# Patient Record
Sex: MALE | Race: WHITE | NOT HISPANIC OR LATINO | Employment: OTHER | URBAN - METROPOLITAN AREA
[De-identification: names, ages, dates, MRNs, and addresses within clinical notes are randomized per-mention and may not be internally consistent; named-entity substitution may affect disease eponyms.]

---

## 2022-05-26 ENCOUNTER — OFFICE VISIT (OUTPATIENT)
Dept: HEMATOLOGY/ONCOLOGY | Facility: CLINIC | Age: 87
End: 2022-05-26
Payer: MEDICARE

## 2022-05-26 VITALS
TEMPERATURE: 98 F | OXYGEN SATURATION: 99 % | RESPIRATION RATE: 18 BRPM | WEIGHT: 282.5 LBS | HEART RATE: 63 BPM | HEIGHT: 68 IN | DIASTOLIC BLOOD PRESSURE: 85 MMHG | BODY MASS INDEX: 42.81 KG/M2 | SYSTOLIC BLOOD PRESSURE: 145 MMHG

## 2022-05-26 DIAGNOSIS — T45.1X5A CHEMOTHERAPY ADVERSE REACTION, INITIAL ENCOUNTER: ICD-10-CM

## 2022-05-26 DIAGNOSIS — Z51.11 ENCOUNTER FOR ANTINEOPLASTIC CHEMOTHERAPY: Primary | ICD-10-CM

## 2022-05-26 DIAGNOSIS — C18.9 COLON CANCER METASTASIZED TO MULTIPLE SITES: ICD-10-CM

## 2022-05-26 PROCEDURE — 99215 PR OFFICE/OUTPT VISIT, EST, LEVL V, 40-54 MIN: ICD-10-PCS | Mod: ,,, | Performed by: STUDENT IN AN ORGANIZED HEALTH CARE EDUCATION/TRAINING PROGRAM

## 2022-05-26 PROCEDURE — 99215 OFFICE O/P EST HI 40 MIN: CPT | Mod: ,,, | Performed by: STUDENT IN AN ORGANIZED HEALTH CARE EDUCATION/TRAINING PROGRAM

## 2022-05-26 RX ORDER — HEPARIN 100 UNIT/ML
500 SYRINGE INTRAVENOUS
Status: CANCELLED | OUTPATIENT
Start: 2022-05-26

## 2022-05-26 RX ORDER — SODIUM CHLORIDE 0.9 % (FLUSH) 0.9 %
10 SYRINGE (ML) INJECTION
Status: CANCELLED | OUTPATIENT
Start: 2022-05-26

## 2022-05-26 RX ORDER — EPINEPHRINE 0.3 MG/.3ML
0.3 INJECTION SUBCUTANEOUS ONCE AS NEEDED
Status: CANCELLED | OUTPATIENT
Start: 2022-05-26

## 2022-05-26 RX ORDER — DIPHENHYDRAMINE HYDROCHLORIDE 50 MG/ML
50 INJECTION INTRAMUSCULAR; INTRAVENOUS ONCE AS NEEDED
Status: CANCELLED | OUTPATIENT
Start: 2022-05-26

## 2022-05-26 RX ORDER — LEVOTHYROXINE SODIUM 200 UG/1
300 TABLET ORAL DAILY
COMMUNITY
Start: 2022-05-15 | End: 2022-07-07

## 2022-05-26 NOTE — PROGRESS NOTES
Chief complaint: Metastatic colon cancer, MSI-H    Onc Hx:  7/2/20-7/9/20: injectafer x2  7/2/20-current: keytruda    HPI: 86 y/o M w/ PMHx of HTN, DVT postop after colectomy (was on Xarelto 11/2019-6/2020, Xarelto stopped due to GI bleed and anemia requiring frequent transfusions), VALENTIN due to GI blood loss, BPH first presented with hematochezia 10-11/2019. He had a colonoscopy with Dr Lima that showed a mass at 60cm, biopsy with poorly differentiated adenocarcinoma. On 11/26/19 he had a left colectomy with partial gastrectomy with Dr Liu, showing poorly differentiated adenocarcinoma invading gastric wall and visceral peritoneum and with 3/14 LNs+. Due to his advanced age, no chemotherapy was offered. 3/2020 he was noted to be anemic, given IV iron infusion and referred back to Dr Liu. He had imaging at [facility] showing widespread intra-abdominal metastatic disease. Referred to [facility] to establish care    He was given 2 doses of injectafer 7/2020  He was started on Keytruda for MSI-H metastatic colon cancer on 7/2/20    Today 05/26/2022, patient denies any new concerns since his last follow-up with us.  His daughter reports resolution of skin darkening around the eyes bilaterally, with improvement in the strength in bilateral lower extremities.  He reports seeing an endocrinologist last week and is waiting for a follow-up visit with them.  He reports a fair appetite denies any weight loss.  He denies any new foci of pain.  He remains independent with his ADLs.      PMHx: HTN, BPH, colon cancer, DVT, VALENTIN due to GI blood loss  PSHx: partial colectomy, cholecystectomy  Social Hx: former smoker quit 2010, 60 pack year, no ETOH, no drugs, prior  but no toxic exposures  Family Hx: mother: lung cancer, maternal aunt: breast cancer  Meds: reviewed  Allergies: NKDA    Labs:  05/24/2022:  Sodium 137, potassium 4.4, creatinine 1.12, albumin 3.7, alk-phos 61, total bili 0.5, AST 27, ALT 37, CEA 3.2, TSH 28.4 ,  cortisol 11.1, WBC 10.11, hemoglobin 16.5, MCV 94.1, platelet count 311.     4/5/2022: 2.3, TSH 17.40, free T4 0.65, creatinine 1.10, WBC count 8.93, hemoglobin 16.0, MCV 93.0, platelet count 409  2/23/2022: CEA 2.3, TSH 19.17,Creatinine 1.12, WBC count 8.94, hemoglobin 14.7, platelet count 347  1/12/2022: CEA 2.4, TSH 21.56, creatinine 1.28, albumin 3.7, WBC count 9.08, hemoglobin 17.0, platelet count 346.  11/30/21 CEA 2.4, TSH 18.088, Cr 1.24, Alb 3.5, WBC 9.21, Hgb 15.9, , ANC 5.96  10/13/21 CEA 2.4 FT4 1.08 Cr 1.17 Alb 3.5 TP 7.2 TSH 2.8 WBC 10 Hg 15.8  ANC 6.29  9/1/21 CEA 1.9, TSH 3.2, Cr 1.37, Alb 3.4, TP 7.2, FT4 1.03, WBC 9.01, Hgb 15.5, , ANC 5.75  7/21/21 CEA 2.2 TSH 6.84 Cr 1.27 Alb 3.5 TP 7.2 WBC 8.41 Hg 15.9  ANC 5.44  6/9/21 Cr 1.26, Alb 3.6, TP 7.5, AST 21, ALT 26, CEA 2.3, TSH 12.48, WBC 9.20, Hgb 15.4,   4/28/21 Cr 1.14 Alb 3.7 TP 7.2 AST 20 ALT 26 TSH 32.366 WBC 9.56 Hg 15.5   3/17/21 Cr 1.37, Alb 3.9, AST 22, ALT 24, CEA 3.8, TSH 33.6, WBC 8.34, Hgb 15.0, , ANC 5.25  2/3/21 Cr 1.43 Alb 3.9 AST 59 ALT 39 CEA 3.4 TSH 55.6 WBC 9.19 Hg 15.6  ANC 5.16 t3 UPTAKE 25.4 tt4 <3 Ferritin 116  12/24/20 Cr 1.09, TP 7.0, ALB 3.6, TSH 35.73, ferritin 66, WBC 8.70, Hg 16.3, , ANC 5.0, CEA 2.3  12/2/20 WBC 7.81 Hg 16.2 MCV 88.9 RDW 16  ANC 4.57 Cr 0.96 Alb 3.7, CEA 2.3  11/4/20 Cr 0.87, TP 7.4, albumin 3.7, WBC 8.43, Hg 17.0, , ANC 5.30, ferritin 47, TSH 3.52, CEA 2.3  10/14/20 WBC 7.98 Hg 16 MCV 85.2  ANC 4.55 Cr 0.94 Alb 3.5  9/23/20 WBC 6.83 Hg 14.8  Alb 3.4 Cr 0.93  9/1/20 WBC 7.77 Hg 14.4 MCV 82.6 RDW 24  ANC 4.51 Cr 0.91 Alb 3.6 AST 18 ALT 23 CEA 2 TSH 2 Ferritin 60  8/12/20 Cr 0.94 Alb 3.6 WBC 7.93 Hg 14.4   7/23/20 Cr 0.8 Ca 8.78 AST 14 ALT 12 CEA 1.6 Alkphos 79 Alb 3.5 TSH 1.15 Iron 42 TIBC 289 Ferritin 512 WBC 6.17 Hg 12.4  MCV 80.2 ANC 3.52  6/22/20 WBC 6.33 Hg 6.4 MCV 75.3 RDW 22.6 PLT  376  20 Cr 0.78 Alb 3.1  19 CEA 1.6    Imagin2022 CT chest abdomen pelvis with contrast:  No evidence of metastatic disease or other significant change since previous CT dated 2021.      21 CT C/A/P: stable chest CT with no evidence of metastatic disease. No evidence of metastatic disease or other significant changes in abdomen/pelvis since prior CT    21 CT C/A/P w/ IV contrast: no evidence of metastatic disease in chest. No evidence of neoplastic disease within abd or pelvis. Fatty liver has worsened. Prostate enlargement. Multiple bladder calculi. Distal colonic diverticulosis. Atherosclerosis.    3/16/21 CT C/A/P: No significant changes and no evidence of metastatic disease in the chest. Decrease in the size of the small soft tissue density nodules in the left upper quadrant. No other significant changes.    20 CT C/A/P w/ IV contrast: no evidence of thoracic metastatic disease. Left upper quadrant abd wall thickening has decreased slightly. Small area of soft tissue density seen in left upper quadrant adjacent to colon. Measures minimally smaller than on prior exam. Additional area of soft tissue density adjacent to stomach and pancreatic tail has not changed. Overall, several small soft tissue masses remain with left abdomen and left abdominal wall, few of those minimally decreased in size from prior scan.    20 CT C/A/P w/ IV contrast: no evidence of thoracic metastatic disease. Significant decrease in size of previously described masses within abd wall on the left. Residual mild localized soft tissue thickening of upper anterior abd wall to the left of midline adjacent to left lobe of liver at site of previously demonstrated mass. Residual oval shaped soft tissue mass more caudally on the left [id number] suspicious for residual tumor. Also residual mass in left upper quadrant between gastric body and tAil of pancreas that has also decreased in size and remains  contiguous with gastric wall. Omental mass decreased to 3cm.    6/9/20 CT C/A/P w/ IV contrast: no significant cardiopulmonary abnormality. Subtle nodularity in right lobe of thyroid lobe. Liver unremarkable. Several solid lesions involving the abdominal wall. 3.9cm lesion in upper abd wall to left of midline. 4.9cm lesion at more caudal level. Several solid lesions are seen within omental fat of left upper quadrant. Portion of this appears to be attached to stomach.    4/9/20 RLE venous duplex: occlusive DVT right popliteal vein.    11/15/19 CT A/P w/ IV contrast: colonic mass at splenic flexure 7.6x4.3cm, contiguous with the stomach. Numerous diverticula in descending and sigmoid colon    Path:  11/26/19 left colectomy with partial gastrectomy: tumor site splenic flexure, greatest dimension 8.5cm, no perforation, adenocarcinoma, mod to poorly differentiated, tumor invades visceral peritoneum and directly invades gastric wall. Margins neg. +LVI, +PNI, no tumor deposits. 3/14 LNs+.  pT4N1b    11/15/19 colon 60cm biopsy: poorly differentiated adenocarcinoma.  MLH1 loss of expression. MSH2 and MSH6 no loss of expression, PMS2 loss of expression. MSI-H both by MMR IHC and MSI PCR Review of Systems CONSTITUTIONAL: no fevers, no chills, no weight loss, no fatigue, no weakness  HEMATOLOGIC: no abnormal bleeding, no abnormal bruising, no drenching night sweats  ONCOLOGIC: no new masses or lumps  HEENT: no vision loss, no tinnitus or hearing loss, no nose bleeding, no dysphagia, no odynophagia  CVS: no chest pain, no palpitations, no dyspnea on exertion  RESP: no shortness of breath, no hemoptysis, no cough  GI: no nausea, no vomiting, no diarrhea, no constipation, no melena, no hematochezia, no hematemesis, no abdominal pain, no increase in abdominal girth  : no dysuria, no hematuria, no hesitancy, no scrotal swelling, no discharge  INTEGUMENT: + rashes, no abnormal bruising, no nail pitting, no hyperpigmentation  NEURO:  no falls, no memory loss, no paresthesias or dysesthesias, no urofecal incontinence or retention, no loss of strength on any extremity  MSK: no back pain, no new joint pain, no joint swelling, + muscle weakness  PSYCH: no suicidal or homicidal ideation, no depression, no insomnia, no anhedonia  ENDOCRINE: no heat or cold intolerance, no polyuria, no polydipsia    Physical Exam   Vitals:    05/26/22 1028   BP: (!) 145/85   Pulse: 63   Resp: 18   Temp: 97.8 °F (36.6 °C)       GA: AAOx3, NAD  HEENT: NCAT, PERRLA, EOMI, left eye ectropion, no oral ulcers  LYMPH: no cervical, axillary or supraclavicular adenopathy  CVS: s1s2 RRR, no M/R/G  RESP: mild bibasilar crackles, no wheezes or rhonchi  ABD: soft, NT, ND, BS+, no hepatosplenomegaly. Well-healed incision without surrounding edema or erythema.  EXT: no deformities, b/l pedal edema 1+  SKIN:  Resolution of prior rash around bilateral eyes  NEURO: normal mentation, strength 5/5 on all 4 extremities, no sensory deficits     Assessment/Plan     1. 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  Keytruda started on 7/2/20. Changed to q6week on 12/23/20  CT C/A/P on 7/16/21 and 11/2021 essentially ERIC.  CT chest abdomen pelvis in April 2022 with ERIC  C/w HCTZ 12.5mg daily  Started Synthroid 75mcg daily on 2/4/21, increased to 100mcg 3/2021, increased to 150mcg 5/2021, increased to 175mcg 6/2021, now on 225 mcg daily. A patient has been seen by Endocrinology with plans to follow-up in the near future.  He remains on 200 mcg of  Synthroid daily according to their recommendations.  Discussed with patient and daughter the options to continue close surveillance versus restart treatment with pembrolizumab given patient's long history of ERIC and almost 2 years of treatment with pembrolizumab.  Patient and daughter would like to continue pembrolizumab with plans to decrease dose or increase the dosing interval if he were to have recurrent side effects from treatment.    Plan  # proceed with next cycle of Keytruda 400 mg Q 6 weeks today  # plan to follow-up in clinic in 6 weeks with repeat CBC, CMP, CEA, TSH and random cortisol.

## 2022-07-06 NOTE — PROGRESS NOTES
Subjective:       Patient ID: Stefano Mccoy is a 87 y.o. male.    Chief Complaint: Colon Cancer      History of Present Illness  Chief complaint: Metastatic colon cancer, MSI-H    Onc Hx:  7/2/20-7/9/20: injectafer x2  7/2/20-current: keytruda    HPI: 86 y/o M w/ PMHx of HTN, DVT postop after colectomy (was on Xarelto 11/2019-6/2020, Xarelto stopped due to GI bleed and anemia requiring frequent transfusions), VALENTIN due to GI blood loss, BPH first presented with hematochezia 10-11/2019. He had a colonoscopy with Dr Lima that showed a mass at 60cm, biopsy with poorly differentiated adenocarcinoma. On 11/26/19 he had a left colectomy with partial gastrectomy with Dr Liu, showing poorly differentiated adenocarcinoma invading gastric wall and visceral peritoneum and with 3/14 LNs+. Due to his advanced age, no chemotherapy was offered. 3/2020 he was noted to be anemic, given IV iron infusion and referred back to Dr Liu. He had imaging at [facility] showing widespread intra-abdominal metastatic disease. Referred to [facility] to establish care    He was given 2 doses of injectafer 7/2020  He was started on Keytruda for MSI-H metastatic colon cancer on 7/2/20    Today 7/7/22: Patient here today with his daughter for follow up visit. He is due for Keytruda today. He tolerated his last cycle very well without any significant toxicities. Patient denies any new concerns since his last follow-up with us and is doing well overall. He continues to follow up endocrinology and states Dr. Shepherd increased his Synthroid to 300 mcg daily about a month ago. He reports a fair appetite denies any weight loss.  He denies any new foci of pain. Bowels are moving well. Denies N/V/D/C or abdominal pain. Denies SOB, CP, HA, fevers chills or recent infections. He remains independent with his ADLs. No symptoms of polyuria or polydipsia. He admits he ate a [place] bar before doing blood work. No concerns reported.       PMHx: HTN, BPH, colon cancer,  DVT, VALENTIN due to GI blood loss  PSHx: partial colectomy, cholecystectomy  Social Hx: former smoker quit 2010, 60 pack year, no ETOH, no drugs, prior  but no toxic exposures  Family Hx: mother: lung cancer, maternal aunt: breast cancer  Meds: reviewed  Allergies: NKDA    Labs:  7/6/22 FT4 1.24, TSH 8.8, cortisol 13.6, Cr 1.31, ,  LFTs WNL, Alb 3.6, WBC 7.67, Hgb 15.9, , ANC 4.80    05/24/2022:  Sodium 137, potassium 4.4, creatinine 1.12, albumin 3.7, alk-phos 61, total bili 0.5, AST 27, ALT 37, CEA 3.2, TSH 28.4 , cortisol 11.1, WBC 10.11, hemoglobin 16.5, MCV 94.1, platelet count 311.     4/5/2022: 2.3, TSH 17.40, free T4 0.65, creatinine 1.10, WBC count 8.93, hemoglobin 16.0, MCV 93.0, platelet count 409  2/23/2022: CEA 2.3, TSH 19.17,Creatinine 1.12, WBC count 8.94, hemoglobin 14.7, platelet count 347  1/12/2022: CEA 2.4, TSH 21.56, creatinine 1.28, albumin 3.7, WBC count 9.08, hemoglobin 17.0, platelet count 346.  11/30/21 CEA 2.4, TSH 18.088, Cr 1.24, Alb 3.5, WBC 9.21, Hgb 15.9, , ANC 5.96  10/13/21 CEA 2.4 FT4 1.08 Cr 1.17 Alb 3.5 TP 7.2 TSH 2.8 WBC 10 Hg 15.8  ANC 6.29  9/1/21 CEA 1.9, TSH 3.2, Cr 1.37, Alb 3.4, TP 7.2, FT4 1.03, WBC 9.01, Hgb 15.5, , ANC 5.75  7/21/21 CEA 2.2 TSH 6.84 Cr 1.27 Alb 3.5 TP 7.2 WBC 8.41 Hg 15.9  ANC 5.44  6/9/21 Cr 1.26, Alb 3.6, TP 7.5, AST 21, ALT 26, CEA 2.3, TSH 12.48, WBC 9.20, Hgb 15.4,   4/28/21 Cr 1.14 Alb 3.7 TP 7.2 AST 20 ALT 26 TSH 32.366 WBC 9.56 Hg 15.5   3/17/21 Cr 1.37, Alb 3.9, AST 22, ALT 24, CEA 3.8, TSH 33.6, WBC 8.34, Hgb 15.0, , ANC 5.25  2/3/21 Cr 1.43 Alb 3.9 AST 59 ALT 39 CEA 3.4 TSH 55.6 WBC 9.19 Hg 15.6  ANC 5.16 t3 UPTAKE 25.4 tt4 <3 Ferritin 116  12/24/20 Cr 1.09, TP 7.0, ALB 3.6, TSH 35.73, ferritin 66, WBC 8.70, Hg 16.3, , ANC 5.0, CEA 2.3  12/2/20 WBC 7.81 Hg 16.2 MCV 88.9 RDW 16  ANC 4.57 Cr 0.96 Alb 3.7, CEA 2.3  11/4/20 Cr 0.87, TP 7.4, albumin 3.7, WBC  8.43, Hg 17.0, , ANC 5.30, ferritin 47, TSH 3.52, CEA 2.3  10/14/20 WBC 7.98 Hg 16 MCV 85.2  ANC 4.55 Cr 0.94 Alb 3.5  20 WBC 6.83 Hg 14.8  Alb 3.4 Cr 0.93  20 WBC 7.77 Hg 14.4 MCV 82.6 RDW 24  ANC 4.51 Cr 0.91 Alb 3.6 AST 18 ALT 23 CEA 2 TSH 2 Ferritin 60  20 Cr 0.94 Alb 3.6 WBC 7.93 Hg 14.4   20 Cr 0.8 Ca 8.78 AST 14 ALT 12 CEA 1.6 Alkphos 79 Alb 3.5 TSH 1.15 Iron 42 TIBC 289 Ferritin 512 WBC 6.17 Hg 12.4  MCV 80.2 ANC 3.52  20 WBC 6.33 Hg 6.4 MCV 75.3 RDW 22.6   20 Cr 0.78 Alb 3.1  19 CEA 1.6    Imagin2022 CT chest abdomen pelvis with contrast:  No evidence of metastatic disease or other significant change since previous CT dated 2021.      21 CT C/A/P: stable chest CT with no evidence of metastatic disease. No evidence of metastatic disease or other significant changes in abdomen/pelvis since prior CT    21 CT C/A/P w/ IV contrast: no evidence of metastatic disease in chest. No evidence of neoplastic disease within abd or pelvis. Fatty liver has worsened. Prostate enlargement. Multiple bladder calculi. Distal colonic diverticulosis. Atherosclerosis.    3/16/21 CT C/A/P: No significant changes and no evidence of metastatic disease in the chest. Decrease in the size of the small soft tissue density nodules in the left upper quadrant. No other significant changes.    20 CT C/A/P w/ IV contrast: no evidence of thoracic metastatic disease. Left upper quadrant abd wall thickening has decreased slightly. Small area of soft tissue density seen in left upper quadrant adjacent to colon. Measures minimally smaller than on prior exam. Additional area of soft tissue density adjacent to stomach and pancreatic tail has not changed. Overall, several small soft tissue masses remain with left abdomen and left abdominal wall, few of those minimally decreased in size from prior scan.    20 CT C/A/P w/ IV  contrast: no evidence of thoracic metastatic disease. Significant decrease in size of previously described masses within abd wall on the left. Residual mild localized soft tissue thickening of upper anterior abd wall to the left of midline adjacent to left lobe of liver at site of previously demonstrated mass. Residual oval shaped soft tissue mass more caudally on the left [id number] suspicious for residual tumor. Also residual mass in left upper quadrant between gastric body and tAil of pancreas that has also decreased in size and remains contiguous with gastric wall. Omental mass decreased to 3cm.    6/9/20 CT C/A/P w/ IV contrast: no significant cardiopulmonary abnormality. Subtle nodularity in right lobe of thyroid lobe. Liver unremarkable. Several solid lesions involving the abdominal wall. 3.9cm lesion in upper abd wall to left of midline. 4.9cm lesion at more caudal level. Several solid lesions are seen within omental fat of left upper quadrant. Portion of this appears to be attached to stomach.    4/9/20 RLE venous duplex: occlusive DVT right popliteal vein.    11/15/19 CT A/P w/ IV contrast: colonic mass at splenic flexure 7.6x4.3cm, contiguous with the stomach. Numerous diverticula in descending and sigmoid colon    Path:  11/26/19 left colectomy with partial gastrectomy: tumor site splenic flexure, greatest dimension 8.5cm, no perforation, adenocarcinoma, mod to poorly differentiated, tumor invades visceral peritoneum and directly invades gastric wall. Margins neg. +LVI, +PNI, no tumor deposits. 3/14 LNs+.  pT4N1b    11/15/19 colon 60cm biopsy: poorly differentiated adenocarcinoma.  MLH1 loss of expression. MSH2 and MSH6 no loss of expression, PMS2 loss of expression. MSI-H both by MMR IHC and MSI PCR       Review of Systems  CONSTITUTIONAL: no fevers, no chills, no weight loss, no fatigue, no weakness  HEMATOLOGIC: no abnormal bleeding, no abnormal bruising, no drenching night sweats  ONCOLOGIC: no new  masses or lumps  HEENT: no vision loss, no tinnitus or hearing loss, no nose bleeding, no dysphagia, no odynophagia  CVS: no chest pain, no palpitations, no dyspnea on exertion  RESP: no shortness of breath, no hemoptysis, no cough  GI: no nausea, no vomiting, no diarrhea, no constipation, no melena, no hematochezia, no hematemesis, no abdominal pain, no increase in abdominal girth  : no dysuria, no hematuria, no hesitancy, no scrotal swelling, no discharge  INTEGUMENT: no rashes, no abnormal bruising, no nail pitting, no hyperpigmentation  NEURO: no falls, no memory loss, no paresthesias or dysesthesias, no urofecal incontinence or retention, no loss of strength on any extremity  MSK: no back pain, no new joint pain, no joint swelling, no muscle weakness  PSYCH: no suicidal or homicidal ideation, no depression, no insomnia, no anhedonia  ENDOCRINE: no heat or cold intolerance, no polyuria, no polydipsia      Objective:      Physical Exam  Vitals:    07/07/22 1323   BP: (!) 166/77   Pulse: 81   Resp: 16   Temp: 97.6 °F (36.4 °C)        GA: AAOx3, NAD  HEENT: NCAT, PERRLA, EOMI, left eye ectropion, no oral ulcers  LYMPH: no cervical, axillary or supraclavicular adenopathy  CVS: s1s2 RRR, no M/R/G  RESP: mild bibasilar crackles, no wheezes or rhonchi  ABD: soft, NT, ND, BS+, no hepatosplenomegaly. Well-healed incision without surrounding edema or erythema.  EXT: no deformities, b/l pedal edema 1+  SKIN:  Resolution of prior rash around bilateral eyes  NEURO: normal mentation, strength 5/5 on all 4 extremities, no sensory deficits   Assessment:       Problem List Items Addressed This Visit        Oncology    Colon cancer metastasized to multiple sites - Primary    Encounter for antineoplastic chemotherapy      1. 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  Keytruda started on 7/2/20. Changed to q6week on 12/23/20  CT C/A/P on 7/16/21 and 11/2021 essentially ERIC.  CT chest abdomen pelvis in April 2022 with ERIC  C/w HCTZ 12.5mg daily  Synthroid increased to 300 mcg daily per Dr. Shepherd on 6/9/22. Most recent TSH improved from prior, now 8.8  MD previously discussed with patient and daughter the options to continue close surveillance versus restart treatment with pembrolizumab given patient's long history of ERIC and almost 2 years of treatment with pembrolizumab.  Patient and daughter would like to continue pembrolizumab with plans to decrease dose or increase the dosing interval if he were to have recurrent side effects from treatment.  Will plan to obtain imaging q4-6 months or as clinically indicated      Plan:       # proceed with next cycle of Keytruda 400 mg Q 6 weeks today  # C/w Synthroid 300 mcg daily as prescribed per Dr. Shepherd  # plan to follow-up in clinic in 6 weeks with repeat CBC, CMP, CEA, TSH, FT4 and random cortisol.  # Call if any questions or concerns      KELLI Cruz    Treatment Plan Information   OP PEMBROLIZUMAB 400MG Q6W   Deidre Bustillo MD   Upcoming Treatment Dates - OP PEMBROLIZUMAB 400MG Q6W    7/7/2022       Chemotherapy       pembrolizumab (KEYTRUDA) 400 mg in sodium chloride 0.9% 116 mL infusion  8/18/2022       Chemotherapy       pembrolizumab (KEYTRUDA) 400 mg in sodium chloride 0.9% 116 mL infusion  9/29/2022       Chemotherapy       pembrolizumab (KEYTRUDA) 400 mg in sodium chloride 0.9% 116 mL infusion  11/10/2022       Chemotherapy       pembrolizumab (KEYTRUDA) 400 mg in sodium chloride 0.9% 116 mL infusion

## 2022-07-07 ENCOUNTER — OFFICE VISIT (OUTPATIENT)
Dept: HEMATOLOGY/ONCOLOGY | Facility: CLINIC | Age: 87
End: 2022-07-07
Payer: MEDICARE

## 2022-07-07 VITALS
RESPIRATION RATE: 16 BRPM | TEMPERATURE: 98 F | DIASTOLIC BLOOD PRESSURE: 77 MMHG | WEIGHT: 277.88 LBS | OXYGEN SATURATION: 95 % | BODY MASS INDEX: 42.11 KG/M2 | HEART RATE: 81 BPM | HEIGHT: 68 IN | SYSTOLIC BLOOD PRESSURE: 166 MMHG

## 2022-07-07 DIAGNOSIS — Z51.11 ENCOUNTER FOR ANTINEOPLASTIC CHEMOTHERAPY: ICD-10-CM

## 2022-07-07 DIAGNOSIS — C18.9 COLON CANCER METASTASIZED TO MULTIPLE SITES: Primary | ICD-10-CM

## 2022-07-07 PROCEDURE — 99214 OFFICE O/P EST MOD 30 MIN: CPT | Mod: ,,, | Performed by: NURSE PRACTITIONER

## 2022-07-07 PROCEDURE — 99214 PR OFFICE/OUTPT VISIT, EST, LEVL IV, 30-39 MIN: ICD-10-PCS | Mod: ,,, | Performed by: NURSE PRACTITIONER

## 2022-07-07 RX ORDER — EPINEPHRINE 0.3 MG/.3ML
0.3 INJECTION SUBCUTANEOUS ONCE AS NEEDED
Status: CANCELLED | OUTPATIENT
Start: 2022-07-07

## 2022-07-07 RX ORDER — LEVOTHYROXINE SODIUM 300 UG/1
300 TABLET ORAL DAILY
COMMUNITY
Start: 2022-06-09 | End: 2022-08-18 | Stop reason: DRUGHIGH

## 2022-07-07 RX ORDER — SODIUM CHLORIDE 0.9 % (FLUSH) 0.9 %
10 SYRINGE (ML) INJECTION
Status: CANCELLED | OUTPATIENT
Start: 2022-07-07

## 2022-07-07 RX ORDER — DIPHENHYDRAMINE HYDROCHLORIDE 50 MG/ML
50 INJECTION INTRAMUSCULAR; INTRAVENOUS ONCE AS NEEDED
Status: CANCELLED | OUTPATIENT
Start: 2022-07-07

## 2022-07-07 RX ORDER — HEPARIN 100 UNIT/ML
500 SYRINGE INTRAVENOUS
Status: CANCELLED | OUTPATIENT
Start: 2022-07-07

## 2022-08-17 NOTE — PROGRESS NOTES
"Subjective:       Patient ID: Stefano Mccoy is a 87 y.o. male.    Chief Complaint: None.     History of Present Illness  Chief complaint: Metastatic colon cancer, MSI-H    Onc Hx:  7/2/20-7/9/20: injectafer x2  7/2/20-current: keytruda    HPI: 86 y/o M w/ PMHx of HTN, DVT postop after colectomy (was on Xarelto 11/2019-6/2020, Xarelto stopped due to GI bleed and anemia requiring frequent transfusions), VALENTIN due to GI blood loss, BPH first presented with hematochezia 10-11/2019. He had a colonoscopy with Dr Lima that showed a mass at 60cm, biopsy with poorly differentiated adenocarcinoma. On 11/26/19 he had a left colectomy with partial gastrectomy with Dr Liu, showing poorly differentiated adenocarcinoma invading gastric wall and visceral peritoneum and with 3/14 LNs+. Due to his advanced age, no chemotherapy was offered. 3/2020 he was noted to be anemic, given IV iron infusion and referred back to Dr Liu. He had imaging at [facility] showing widespread intra-abdominal metastatic disease. Referred to [facility] to establish care    He was given 2 doses of injectafer 7/2020  He was started on Keytruda for MSI-H metastatic colon cancer on 7/2/20    Interval history:   Today 8/18/2022: Patient here today with his daughter for follow up visit. He is due for every 6 week Keytruda today. He states he is doing "great" and has no complaints.   He tolerated his last cycle very well without any significant toxicities. Reports appetite as good. Weight is stable. He does have a diagnosis of macular degeneration(dry). States no vision changes, feels he sees pretty good. Was seeing Dr. Ortiz, but per daughter no further treatment is available.   He denies any cough, SOB, CP, abdominal pain, constipation, diarrhea, rashes or fever.     PMHx: HTN, BPH, colon cancer, DVT, VALENTIN due to GI blood loss  PSHx: partial colectomy, cholecystectomy  Social Hx: former smoker quit 2010, 60 pack year, no ETOH, no drugs, prior  but no toxic " exposures  Family Hx: mother: lung cancer, maternal aunt: breast cancer  Meds: reviewed  Allergies: NKDA    Labs:  7/6/22 FT4 1.24, TSH 8.8, cortisol 13.6, Cr 1.31, ,  LFTs WNL, Alb 3.6, WBC 7.67, Hgb 15.9, , ANC 4.80    05/24/2022:  Sodium 137, potassium 4.4, creatinine 1.12, albumin 3.7, alk-phos 61, total bili 0.5, AST 27, ALT 37, CEA 3.2, TSH 28.4 , cortisol 11.1, WBC 10.11, hemoglobin 16.5, MCV 94.1, platelet count 311.     4/5/2022: 2.3, TSH 17.40, free T4 0.65, creatinine 1.10, WBC count 8.93, hemoglobin 16.0, MCV 93.0, platelet count 409  2/23/2022: CEA 2.3, TSH 19.17,Creatinine 1.12, WBC count 8.94, hemoglobin 14.7, platelet count 347  1/12/2022: CEA 2.4, TSH 21.56, creatinine 1.28, albumin 3.7, WBC count 9.08, hemoglobin 17.0, platelet count 346.  11/30/21 CEA 2.4, TSH 18.088, Cr 1.24, Alb 3.5, WBC 9.21, Hgb 15.9, , ANC 5.96  10/13/21 CEA 2.4 FT4 1.08 Cr 1.17 Alb 3.5 TP 7.2 TSH 2.8 WBC 10 Hg 15.8  ANC 6.29  9/1/21 CEA 1.9, TSH 3.2, Cr 1.37, Alb 3.4, TP 7.2, FT4 1.03, WBC 9.01, Hgb 15.5, , ANC 5.75  7/21/21 CEA 2.2 TSH 6.84 Cr 1.27 Alb 3.5 TP 7.2 WBC 8.41 Hg 15.9  ANC 5.44  6/9/21 Cr 1.26, Alb 3.6, TP 7.5, AST 21, ALT 26, CEA 2.3, TSH 12.48, WBC 9.20, Hgb 15.4,   4/28/21 Cr 1.14 Alb 3.7 TP 7.2 AST 20 ALT 26 TSH 32.366 WBC 9.56 Hg 15.5   3/17/21 Cr 1.37, Alb 3.9, AST 22, ALT 24, CEA 3.8, TSH 33.6, WBC 8.34, Hgb 15.0, , ANC 5.25  2/3/21 Cr 1.43 Alb 3.9 AST 59 ALT 39 CEA 3.4 TSH 55.6 WBC 9.19 Hg 15.6  ANC 5.16 t3 UPTAKE 25.4 tt4 <3 Ferritin 116  12/24/20 Cr 1.09, TP 7.0, ALB 3.6, TSH 35.73, ferritin 66, WBC 8.70, Hg 16.3, , ANC 5.0, CEA 2.3  12/2/20 WBC 7.81 Hg 16.2 MCV 88.9 RDW 16  ANC 4.57 Cr 0.96 Alb 3.7, CEA 2.3  11/4/20 Cr 0.87, TP 7.4, albumin 3.7, WBC 8.43, Hg 17.0, , ANC 5.30, ferritin 47, TSH 3.52, CEA 2.3  10/14/20 WBC 7.98 Hg 16 MCV 85.2  ANC 4.55 Cr 0.94 Alb 3.5  9/23/20 WBC 6.83 Hg 14.8  Alb  3.4 Cr 0.93  20 WBC 7.77 Hg 14.4 MCV 82.6 RDW 24  ANC 4.51 Cr 0.91 Alb 3.6 AST 18 ALT 23 CEA 2 TSH 2 Ferritin 60  20 Cr 0.94 Alb 3.6 WBC 7.93 Hg 14.4   20 Cr 0.8 Ca 8.78 AST 14 ALT 12 CEA 1.6 Alkphos 79 Alb 3.5 TSH 1.15 Iron 42 TIBC 289 Ferritin 512 WBC 6.17 Hg 12.4  MCV 80.2 ANC 3.52  20 WBC 6.33 Hg 6.4 MCV 75.3 RDW 22.6   20 Cr 0.78 Alb 3.1  19 CEA 1.6    Imagin2022 CT chest abdomen pelvis with contrast:  No evidence of metastatic disease or other significant change since previous CT dated 2021.      21 CT C/A/P: stable chest CT with no evidence of metastatic disease. No evidence of metastatic disease or other significant changes in abdomen/pelvis since prior CT    21 CT C/A/P w/ IV contrast: no evidence of metastatic disease in chest. No evidence of neoplastic disease within abd or pelvis. Fatty liver has worsened. Prostate enlargement. Multiple bladder calculi. Distal colonic diverticulosis. Atherosclerosis.    3/16/21 CT C/A/P: No significant changes and no evidence of metastatic disease in the chest. Decrease in the size of the small soft tissue density nodules in the left upper quadrant. No other significant changes.    20 CT C/A/P w/ IV contrast: no evidence of thoracic metastatic disease. Left upper quadrant abd wall thickening has decreased slightly. Small area of soft tissue density seen in left upper quadrant adjacent to colon. Measures minimally smaller than on prior exam. Additional area of soft tissue density adjacent to stomach and pancreatic tail has not changed. Overall, several small soft tissue masses remain with left abdomen and left abdominal wall, few of those minimally decreased in size from prior scan.    20 CT C/A/P w/ IV contrast: no evidence of thoracic metastatic disease. Significant decrease in size of previously described masses within abd wall on the left. Residual mild localized soft  tissue thickening of upper anterior abd wall to the left of midline adjacent to left lobe of liver at site of previously demonstrated mass. Residual oval shaped soft tissue mass more caudally on the left [id number] suspicious for residual tumor. Also residual mass in left upper quadrant between gastric body and tAil of pancreas that has also decreased in size and remains contiguous with gastric wall. Omental mass decreased to 3cm.    6/9/20 CT C/A/P w/ IV contrast: no significant cardiopulmonary abnormality. Subtle nodularity in right lobe of thyroid lobe. Liver unremarkable. Several solid lesions involving the abdominal wall. 3.9cm lesion in upper abd wall to left of midline. 4.9cm lesion at more caudal level. Several solid lesions are seen within omental fat of left upper quadrant. Portion of this appears to be attached to stomach.    4/9/20 RLE venous duplex: occlusive DVT right popliteal vein.    11/15/19 CT A/P w/ IV contrast: colonic mass at splenic flexure 7.6x4.3cm, contiguous with the stomach. Numerous diverticula in descending and sigmoid colon    Path:  11/26/19 left colectomy with partial gastrectomy: tumor site splenic flexure, greatest dimension 8.5cm, no perforation, adenocarcinoma, mod to poorly differentiated, tumor invades visceral peritoneum and directly invades gastric wall. Margins neg. +LVI, +PNI, no tumor deposits. 3/14 LNs+.  pT4N1b    11/15/19 colon 60cm biopsy: poorly differentiated adenocarcinoma.  MLH1 loss of expression. MSH2 and MSH6 no loss of expression, PMS2 loss of expression. MSI-H both by MMR IHC and MSI PCR     Review of Systems  CONSTITUTIONAL: no fevers, no chills, no weight loss, no fatigue, no weakness  HEMATOLOGIC: no abnormal bleeding, no abnormal bruising, no drenching night sweats  ONCOLOGIC: no new masses or lumps  HEENT: no vision loss, no tinnitus or hearing loss, no nose bleeding, no dysphagia, no odynophagia, no dental pain.   CVS: no chest pain, no palpitations, no  "dyspnea on exertion  RESP: no shortness of breath, no hemoptysis, no cough  GI: no nausea, no vomiting, no diarrhea, no constipation, no melena, no hematochezia, no hematemesis, no abdominal pain, no increase in abdominal girth  : no dysuria, no hematuria, no hesitancy, no scrotal swelling, no discharge  INTEGUMENT: no rashes, no abnormal bruising, no nail pitting, no hyperpigmentation  NEURO: no falls, no memory loss, no paresthesias or dysesthesias, no urofecal incontinence or retention, no loss of strength on any extremity  MSK: no back pain, no new joint pain, no joint swelling, no muscle weakness  PSYCH: no suicidal or homicidal ideation, no depression, no insomnia, no anhedonia  ENDOCRINE: no heat or cold intolerance, no polyuria, no polydipsia    Objective:     Physical Exam  BP  154/90      BP Location  Left arm     Patient Position  Sitting     BP Method  Large (Automatic)     Pulse  68     Resp  16     Temp  97.5 F (36.4 C)     Temp src  Oral     SpO2  93%     Weight   126 kg (277 lb 12.5 oz)     Height  5' 8" (1.727 m)     Pain Score  0-No pain      GA: AAOx3, very pleasant gentleman with a joking disposition. NAD  HEENT: NCAT, PERRL, EOMI, left eye ectropion, Oropharynx moist, fair dentition. no oral ulcers  LYMPH: no cervical, axillary or supraclavicular adenopathy  CVS: s1s2 RRR, no M/R/G  RESP: Lung sounds clear to auscultation. No wheezing or other adventitious sounds.   ABD: soft, NT, ND, BS+, no hepatosplenomegaly. Well-healed incision, left side. Hernia present just lateral of incision. Soft and non-tender, no pressure.   EXT: no deformities, b/l pedal edema 1+  SKIN:  Warm, dry. No rashes or bruising. No jaundice.   NEURO: normal mentation, strength 5/5 on all 4 extremities, no sensory deficits     Assessment:       Problem List Items Addressed This Visit    None     1. 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  Keytruda started on 7/2/20. Changed to q6week on 12/23/20  CT C/A/P on 7/16/21 and 11/2021 essentially ERIC.  CT chest abdomen pelvis in April 2022 with [facility]OO previously discussed with patient and daughter the options to continue close surveillance versus restart treatment with pembrolizumab given patient's long history of ERIC and almost 2 years of treatment with pembrolizumab.  Patient and daughter would like to continue pembrolizumab with plans to decrease dose or increase the dosing interval if he were to have recurrent side effects from treatment.  Will plan to obtain imaging q4-6 months or as clinically indicated    Low TSH - per daughter, she reports that when he was on the 225 mcg, patient did not always take the 25 mcg. So will decrease his Levothyroxine dose today to 200 mcg from 300 mcg. Repeat TSH next appointment.     HTN - improved from before. Per daughter, he is not on any blood pressure medication. Advised him to limit his salt intake. Normal creatinine.      Plan:   # proceed with next cycle of Keytruda 400 mg Q 6 weeks today  # C/w Synthroid - decreased dose 200 mcg daily. Follow up with Dr. Shepherd as directed.   # plan to follow-up in clinic in 6 weeks with repeat CBC, CMP, CEA, TSH, FT4 and random cortisol.  # Call if any questions or concerns      Zoila Barbosa NP-C    Treatment Plan Information   OP PEMBROLIZUMAB 400MG Q6W   Deidre Bustillo MD   Upcoming Treatment Dates - OP PEMBROLIZUMAB 400MG Q6W    8/18/2022       Chemotherapy       pembrolizumab (KEYTRUDA) 400 mg in sodium chloride 0.9% 116 mL  infusion  9/29/2022       Chemotherapy       pembrolizumab (KEYTRUDA) 400 mg in sodium chloride 0.9% 116 mL infusion  11/10/2022       Chemotherapy       pembrolizumab (KEYTRUDA) 400 mg in sodium chloride 0.9% 116 mL infusion  12/22/2022       Chemotherapy       pembrolizumab (KEYTRUDA) 400 mg in sodium chloride 0.9% 116 mL infusion

## 2022-08-18 ENCOUNTER — OFFICE VISIT (OUTPATIENT)
Dept: HEMATOLOGY/ONCOLOGY | Facility: CLINIC | Age: 87
End: 2022-08-18
Payer: MEDICARE

## 2022-08-18 VITALS
HEART RATE: 68 BPM | TEMPERATURE: 98 F | HEIGHT: 68 IN | DIASTOLIC BLOOD PRESSURE: 90 MMHG | RESPIRATION RATE: 16 BRPM | BODY MASS INDEX: 42.1 KG/M2 | OXYGEN SATURATION: 93 % | SYSTOLIC BLOOD PRESSURE: 154 MMHG | WEIGHT: 277.75 LBS

## 2022-08-18 DIAGNOSIS — Z51.12 ENCOUNTER FOR ANTINEOPLASTIC IMMUNOTHERAPY: ICD-10-CM

## 2022-08-18 DIAGNOSIS — C18.9 COLON CANCER METASTASIZED TO MULTIPLE SITES: Primary | ICD-10-CM

## 2022-08-18 PROCEDURE — 99215 OFFICE O/P EST HI 40 MIN: CPT | Mod: ,,, | Performed by: NURSE PRACTITIONER

## 2022-08-18 PROCEDURE — 99215 PR OFFICE/OUTPT VISIT, EST, LEVL V, 40-54 MIN: ICD-10-PCS | Mod: ,,, | Performed by: NURSE PRACTITIONER

## 2022-08-18 RX ORDER — HEPARIN 100 UNIT/ML
500 SYRINGE INTRAVENOUS
Status: CANCELLED | OUTPATIENT
Start: 2022-08-18

## 2022-08-18 RX ORDER — SODIUM CHLORIDE 0.9 % (FLUSH) 0.9 %
10 SYRINGE (ML) INJECTION
Status: CANCELLED | OUTPATIENT
Start: 2022-08-18

## 2022-08-18 RX ORDER — EPINEPHRINE 0.3 MG/.3ML
0.3 INJECTION SUBCUTANEOUS ONCE AS NEEDED
Status: CANCELLED | OUTPATIENT
Start: 2022-08-18

## 2022-08-18 RX ORDER — LEVOTHYROXINE SODIUM 200 UG/1
200 TABLET ORAL DAILY
Qty: 30 TABLET | Refills: 11 | Status: SHIPPED | OUTPATIENT
Start: 2022-08-18 | End: 2022-09-29 | Stop reason: SDUPTHER

## 2022-08-18 RX ORDER — DIPHENHYDRAMINE HYDROCHLORIDE 50 MG/ML
50 INJECTION INTRAMUSCULAR; INTRAVENOUS ONCE AS NEEDED
Status: CANCELLED | OUTPATIENT
Start: 2022-08-18

## 2022-09-29 ENCOUNTER — OFFICE VISIT (OUTPATIENT)
Dept: HEMATOLOGY/ONCOLOGY | Facility: CLINIC | Age: 87
End: 2022-09-29
Payer: MEDICARE

## 2022-09-29 VITALS
HEIGHT: 68 IN | SYSTOLIC BLOOD PRESSURE: 147 MMHG | DIASTOLIC BLOOD PRESSURE: 86 MMHG | WEIGHT: 270.31 LBS | HEART RATE: 60 BPM | TEMPERATURE: 98 F | OXYGEN SATURATION: 95 % | RESPIRATION RATE: 18 BRPM | BODY MASS INDEX: 40.97 KG/M2

## 2022-09-29 DIAGNOSIS — C18.9 COLON CANCER METASTASIZED TO MULTIPLE SITES: Primary | ICD-10-CM

## 2022-09-29 DIAGNOSIS — Z51.12 ENCOUNTER FOR ANTINEOPLASTIC IMMUNOTHERAPY: ICD-10-CM

## 2022-09-29 DIAGNOSIS — E03.9 HYPOTHYROIDISM, UNSPECIFIED TYPE: ICD-10-CM

## 2022-09-29 DIAGNOSIS — Z51.11 ENCOUNTER FOR ANTINEOPLASTIC CHEMOTHERAPY: ICD-10-CM

## 2022-09-29 PROCEDURE — 99215 OFFICE O/P EST HI 40 MIN: CPT | Mod: ,,, | Performed by: STUDENT IN AN ORGANIZED HEALTH CARE EDUCATION/TRAINING PROGRAM

## 2022-09-29 PROCEDURE — 99215 PR OFFICE/OUTPT VISIT, EST, LEVL V, 40-54 MIN: ICD-10-PCS | Mod: ,,, | Performed by: STUDENT IN AN ORGANIZED HEALTH CARE EDUCATION/TRAINING PROGRAM

## 2022-09-29 RX ORDER — SODIUM CHLORIDE 0.9 % (FLUSH) 0.9 %
10 SYRINGE (ML) INJECTION
Status: CANCELLED | OUTPATIENT
Start: 2022-09-29

## 2022-09-29 RX ORDER — LEVOTHYROXINE SODIUM 300 UG/1
300 TABLET ORAL DAILY
Qty: 30 TABLET | Refills: 11 | Status: SHIPPED | OUTPATIENT
Start: 2022-09-29 | End: 2023-07-20

## 2022-09-29 RX ORDER — EPINEPHRINE 0.3 MG/.3ML
0.3 INJECTION SUBCUTANEOUS ONCE AS NEEDED
Status: CANCELLED | OUTPATIENT
Start: 2022-09-29

## 2022-09-29 RX ORDER — HEPARIN 100 UNIT/ML
500 SYRINGE INTRAVENOUS
Status: CANCELLED | OUTPATIENT
Start: 2022-09-29

## 2022-09-29 RX ORDER — DIPHENHYDRAMINE HYDROCHLORIDE 50 MG/ML
50 INJECTION INTRAMUSCULAR; INTRAVENOUS ONCE AS NEEDED
Status: CANCELLED | OUTPATIENT
Start: 2022-09-29

## 2022-09-29 NOTE — PROGRESS NOTES
Subjective:       Patient ID: Stefano Mccoy is a 88 y.o. male.    Chief Complaint: None.     History of Present Illness  Chief complaint: Metastatic colon cancer, MSI-H    Onc Hx:  7/2/20-7/9/20: injectafer x2  7/2/20-current: keytruda    HPI: 88 y/o M w/ PMHx of HTN, DVT postop after colectomy (was on Xarelto 11/2019-6/2020, Xarelto stopped due to GI bleed and anemia requiring frequent transfusions), VALENTIN due to GI blood loss, BPH first presented with hematochezia 10-11/2019. He had a colonoscopy with Dr Lima that showed a mass at 60cm, biopsy with poorly differentiated adenocarcinoma. On 11/26/19 he had a left colectomy with partial gastrectomy with Dr Liu, showing poorly differentiated adenocarcinoma invading gastric wall and visceral peritoneum and with 3/14 LNs+. Due to his advanced age, no chemotherapy was offered. 3/2020 he was noted to be anemic, given IV iron infusion and referred back to Dr Liu. He had imaging at [facility] showing widespread intra-abdominal metastatic disease. Referred to [facility] to establish care    He was given 2 doses of injectafer 7/2020  He was started on Keytruda for MSI-H metastatic colon cancer on 7/2/20    Interval history:   Today 09/29/2022, patient denies any acute concerns since his last follow-up visit with us.  He denies any symptoms of abdominal pain, change in his bowel habits, blood in his stools, decreased appetite, weight loss.  He denies any new symptoms of pain.  He reports tolerating his last cycle of treatment well without any significant side effects.  He denies any further episodes of skin rashes..     PMHx: HTN, BPH, colon cancer, DVT, VALENTIN due to GI blood loss  PSHx: partial colectomy, cholecystectomy  Social Hx: former smoker quit 2010, 60 pack year, no ETOH, no drugs, prior  but no toxic exposures  Family Hx: mother: lung cancer, maternal aunt: breast cancer  Meds: reviewed  Allergies: NKDA    Labs:  09/28/2022:  CEA 2.5, TSH 18, cortisol 13.2,  Creatinine 1.25, albumin 3.5, LFTs within normal limits, WBC 9.72, hemoglobin 16, MCV 92, platelet count 358.    7/6/22 FT4 1.24, TSH 8.8, cortisol 13.6, Cr 1.31, ,  LFTs WNL, Alb 3.6, WBC 7.67, Hgb 15.9, , ANC 4.80    05/24/2022:  Sodium 137, potassium 4.4, creatinine 1.12, albumin 3.7, alk-phos 61, total bili 0.5, AST 27, ALT 37, CEA 3.2, TSH 28.4 , cortisol 11.1, WBC 10.11, hemoglobin 16.5, MCV 94.1, platelet count 311.     4/5/2022: 2.3, TSH 17.40, free T4 0.65, creatinine 1.10, WBC count 8.93, hemoglobin 16.0, MCV 93.0, platelet count 409  2/23/2022: CEA 2.3, TSH 19.17,Creatinine 1.12, WBC count 8.94, hemoglobin 14.7, platelet count 347  1/12/2022: CEA 2.4, TSH 21.56, creatinine 1.28, albumin 3.7, WBC count 9.08, hemoglobin 17.0, platelet count 346.  11/30/21 CEA 2.4, TSH 18.088, Cr 1.24, Alb 3.5, WBC 9.21, Hgb 15.9, , ANC 5.96  10/13/21 CEA 2.4 FT4 1.08 Cr 1.17 Alb 3.5 TP 7.2 TSH 2.8 WBC 10 Hg 15.8  ANC 6.29  9/1/21 CEA 1.9, TSH 3.2, Cr 1.37, Alb 3.4, TP 7.2, FT4 1.03, WBC 9.01, Hgb 15.5, , ANC 5.75  7/21/21 CEA 2.2 TSH 6.84 Cr 1.27 Alb 3.5 TP 7.2 WBC 8.41 Hg 15.9  ANC 5.44  6/9/21 Cr 1.26, Alb 3.6, TP 7.5, AST 21, ALT 26, CEA 2.3, TSH 12.48, WBC 9.20, Hgb 15.4,   4/28/21 Cr 1.14 Alb 3.7 TP 7.2 AST 20 ALT 26 TSH 32.366 WBC 9.56 Hg 15.5   3/17/21 Cr 1.37, Alb 3.9, AST 22, ALT 24, CEA 3.8, TSH 33.6, WBC 8.34, Hgb 15.0, , ANC 5.25  2/3/21 Cr 1.43 Alb 3.9 AST 59 ALT 39 CEA 3.4 TSH 55.6 WBC 9.19 Hg 15.6  ANC 5.16 t3 UPTAKE 25.4 tt4 <3 Ferritin 116  12/24/20 Cr 1.09, TP 7.0, ALB 3.6, TSH 35.73, ferritin 66, WBC 8.70, Hg 16.3, , ANC 5.0, CEA 2.3  12/2/20 WBC 7.81 Hg 16.2 MCV 88.9 RDW 16  ANC 4.57 Cr 0.96 Alb 3.7, CEA 2.3  11/4/20 Cr 0.87, TP 7.4, albumin 3.7, WBC 8.43, Hg 17.0, , ANC 5.30, ferritin 47, TSH 3.52, CEA 2.3  10/14/20 WBC 7.98 Hg 16 MCV 85.2  ANC 4.55 Cr 0.94 Alb 3.5  9/23/20 WBC 6.83 Hg 14.8  Alb 3.4  Cr 0.93  20 WBC 7.77 Hg 14.4 MCV 82.6 RDW 24  ANC 4.51 Cr 0.91 Alb 3.6 AST 18 ALT 23 CEA 2 TSH 2 Ferritin 60  20 Cr 0.94 Alb 3.6 WBC 7.93 Hg 14.4   20 Cr 0.8 Ca 8.78 AST 14 ALT 12 CEA 1.6 Alkphos 79 Alb 3.5 TSH 1.15 Iron 42 TIBC 289 Ferritin 512 WBC 6.17 Hg 12.4  MCV 80.2 ANC 3.52  20 WBC 6.33 Hg 6.4 MCV 75.3 RDW 22.6   20 Cr 0.78 Alb 3.1  19 CEA 1.6    Imagin2022 CT chest abdomen pelvis with contrast:  No evidence of metastatic disease or other significant change since previous CT dated 2021.      21 CT C/A/P: stable chest CT with no evidence of metastatic disease. No evidence of metastatic disease or other significant changes in abdomen/pelvis since prior CT    21 CT C/A/P w/ IV contrast: no evidence of metastatic disease in chest. No evidence of neoplastic disease within abd or pelvis. Fatty liver has worsened. Prostate enlargement. Multiple bladder calculi. Distal colonic diverticulosis. Atherosclerosis.    3/16/21 CT C/A/P: No significant changes and no evidence of metastatic disease in the chest. Decrease in the size of the small soft tissue density nodules in the left upper quadrant. No other significant changes.    20 CT C/A/P w/ IV contrast: no evidence of thoracic metastatic disease. Left upper quadrant abd wall thickening has decreased slightly. Small area of soft tissue density seen in left upper quadrant adjacent to colon. Measures minimally smaller than on prior exam. Additional area of soft tissue density adjacent to stomach and pancreatic tail has not changed. Overall, several small soft tissue masses remain with left abdomen and left abdominal wall, few of those minimally decreased in size from prior scan.    20 CT C/A/P w/ IV contrast: no evidence of thoracic metastatic disease. Significant decrease in size of previously described masses within abd wall on the left. Residual mild localized soft tissue  thickening of upper anterior abd wall to the left of midline adjacent to left lobe of liver at site of previously demonstrated mass. Residual oval shaped soft tissue mass more caudally on the left [id number] suspicious for residual tumor. Also residual mass in left upper quadrant between gastric body and tAil of pancreas that has also decreased in size and remains contiguous with gastric wall. Omental mass decreased to 3cm.    6/9/20 CT C/A/P w/ IV contrast: no significant cardiopulmonary abnormality. Subtle nodularity in right lobe of thyroid lobe. Liver unremarkable. Several solid lesions involving the abdominal wall. 3.9cm lesion in upper abd wall to left of midline. 4.9cm lesion at more caudal level. Several solid lesions are seen within omental fat of left upper quadrant. Portion of this appears to be attached to stomach.    4/9/20 RLE venous duplex: occlusive DVT right popliteal vein.    11/15/19 CT A/P w/ IV contrast: colonic mass at splenic flexure 7.6x4.3cm, contiguous with the stomach. Numerous diverticula in descending and sigmoid colon    Path:  11/26/19 left colectomy with partial gastrectomy: tumor site splenic flexure, greatest dimension 8.5cm, no perforation, adenocarcinoma, mod to poorly differentiated, tumor invades visceral peritoneum and directly invades gastric wall. Margins neg. +LVI, +PNI, no tumor deposits. 3/14 LNs+.  pT4N1b    11/15/19 colon 60cm biopsy: poorly differentiated adenocarcinoma.  MLH1 loss of expression. MSH2 and MSH6 no loss of expression, PMS2 loss of expression. MSI-H both by MMR IHC and MSI PCR     Review of Systems    CONSTITUTIONAL: no fevers, no chills, no weight loss, no fatigue, no weakness  HEMATOLOGIC: no abnormal bleeding, no abnormal bruising, no drenching night sweats  ONCOLOGIC: no new masses or lumps  HEENT: no vision loss, no tinnitus or hearing loss, no nose bleeding, no dysphagia, no odynophagia, no dental pain.   CVS: no chest pain, no palpitations, no dyspnea  "on exertion  RESP: no shortness of breath, no hemoptysis, no cough  GI: no nausea, no vomiting, no diarrhea, no constipation, no melena, no hematochezia, no hematemesis, no abdominal pain, no increase in abdominal girth  : no dysuria, no hematuria, no hesitancy, no scrotal swelling, no discharge  INTEGUMENT: no rashes, no abnormal bruising, no nail pitting, no hyperpigmentation  NEURO: no falls, no memory loss, no paresthesias or dysesthesias, no urofecal incontinence or retention, no loss of strength on any extremity  MSK: no back pain, no new joint pain, no joint swelling, no muscle weakness  PSYCH: no suicidal or homicidal ideation, no depression, no insomnia, no anhedonia  ENDOCRINE: no heat or cold intolerance, no polyuria, no polydipsia    Objective:     Physical Exam  BP  154/90      BP Location  Left arm     Patient Position  Sitting     BP Method  Large (Automatic)     Pulse  68     Resp  16     Temp  97.5 F (36.4 C)     Temp src  Oral     SpO2  93%     Weight   126 kg (277 lb 12.5 oz)     Height  5' 8" (1.727 m)     Pain Score  0-No pain      GA: AAOx3, very pleasant gentleman with a joking disposition. NAD  HEENT: NCAT, PERRL, EOMI, left eye ectropion, Oropharynx moist, fair dentition. no oral ulcers  LYMPH: no cervical, axillary or supraclavicular adenopathy  CVS: s1s2 RRR, no M/R/G  RESP: Lung sounds clear to auscultation. No wheezing or other adventitious sounds.   ABD: soft, NT, ND, BS+, no hepatosplenomegaly. Well-healed incision, left side. Hernia present just lateral of incision. Soft and non-tender, no pressure.   EXT: no deformities, b/l pedal edema 1+  SKIN:  Warm, dry. No rashes or bruising. No jaundice.   NEURO: normal mentation, strength 5/5 on all 4 extremities, no sensory deficits     Assessment:       Problem List Items Addressed This Visit          Oncology    Colon cancer metastasized to multiple sites - Primary    Relevant Orders    CT Chest Abdomen Pelvis With Contrast    Encounter " for antineoplastic chemotherapy       Endocrine    Hypothyroidism     Other Visit Diagnoses       Encounter for antineoplastic immunotherapy        Relevant Medications    levothyroxine (SYNTHROID) 300 MCG Tab          1. 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  Keytruda started on 7/2/20. Changed to q6week on 12/23/20  CT C/A/P on 7/16/21 and 11/2021 essentially ERIC.  CT chest abdomen pelvis in April 2022 with ERIC  Previously discussed with patient and daughter the options to continue close surveillance versus restart treatment with pembrolizumab given patient's long history of ERIC and almost 2 years of treatment with pembrolizumab.  Patient and daughter would like to continue pembrolizumab with plans to decrease dose or increase the dosing interval if he were to have recurrent side effects from treatment.  Will plan to obtain imaging q4-6 months or as clinically indicated       Plan:   # proceed with next cycle of Keytruda 400 mg Q 6 weeks today  # C/w Synthroid - increase dose to  300 mcg daily.  # CT chest abdomen pelvis with IV contrast prior to next follow-up visit  # plan to follow-up in clinic in 6 weeks with repeat CBC, CMP, CEA, TSH, FT4 and random cortisol.  # Call if any questions or concerns

## 2022-11-10 ENCOUNTER — OFFICE VISIT (OUTPATIENT)
Dept: HEMATOLOGY/ONCOLOGY | Facility: CLINIC | Age: 87
End: 2022-11-10
Payer: MEDICARE

## 2022-11-10 VITALS
RESPIRATION RATE: 18 BRPM | WEIGHT: 276.19 LBS | HEART RATE: 59 BPM | HEIGHT: 68 IN | DIASTOLIC BLOOD PRESSURE: 83 MMHG | TEMPERATURE: 98 F | OXYGEN SATURATION: 95 % | SYSTOLIC BLOOD PRESSURE: 145 MMHG | BODY MASS INDEX: 41.86 KG/M2

## 2022-11-10 DIAGNOSIS — E03.9 HYPOTHYROIDISM, UNSPECIFIED TYPE: ICD-10-CM

## 2022-11-10 DIAGNOSIS — Z51.11 ENCOUNTER FOR ANTINEOPLASTIC CHEMOTHERAPY: ICD-10-CM

## 2022-11-10 DIAGNOSIS — C18.9 COLON CANCER METASTASIZED TO MULTIPLE SITES: Primary | ICD-10-CM

## 2022-11-10 DIAGNOSIS — E04.1 THYROID NODULE: ICD-10-CM

## 2022-11-10 PROCEDURE — 99215 PR OFFICE/OUTPT VISIT, EST, LEVL V, 40-54 MIN: ICD-10-PCS | Mod: ,,, | Performed by: STUDENT IN AN ORGANIZED HEALTH CARE EDUCATION/TRAINING PROGRAM

## 2022-11-10 PROCEDURE — 99215 OFFICE O/P EST HI 40 MIN: CPT | Mod: ,,, | Performed by: STUDENT IN AN ORGANIZED HEALTH CARE EDUCATION/TRAINING PROGRAM

## 2022-11-10 RX ORDER — SODIUM CHLORIDE 0.9 % (FLUSH) 0.9 %
10 SYRINGE (ML) INJECTION
Status: CANCELLED | OUTPATIENT
Start: 2022-11-10

## 2022-11-10 RX ORDER — EPINEPHRINE 0.3 MG/.3ML
0.3 INJECTION SUBCUTANEOUS ONCE AS NEEDED
Status: CANCELLED | OUTPATIENT
Start: 2022-11-10

## 2022-11-10 RX ORDER — HEPARIN 100 UNIT/ML
500 SYRINGE INTRAVENOUS
Status: CANCELLED | OUTPATIENT
Start: 2022-11-10

## 2022-11-10 RX ORDER — DIPHENHYDRAMINE HYDROCHLORIDE 50 MG/ML
50 INJECTION INTRAMUSCULAR; INTRAVENOUS ONCE AS NEEDED
Status: CANCELLED | OUTPATIENT
Start: 2022-11-10

## 2022-11-10 NOTE — PROGRESS NOTES
Subjective:       Patient ID: Stefano Mccoy is a 88 y.o. male.    Chief Complaint: None.     History of Present Illness  Chief complaint: Metastatic colon cancer, MSI-H    Onc Hx:  7/2/20-7/9/20: injectafer x2  7/2/20-current: keytruda    HPI: 86 y/o M w/ PMHx of HTN, DVT postop after colectomy (was on Xarelto 11/2019-6/2020, Xarelto stopped due to GI bleed and anemia requiring frequent transfusions), VALENTIN due to GI blood loss, BPH first presented with hematochezia 10-11/2019. He had a colonoscopy with Dr Lima that showed a mass at 60cm, biopsy with poorly differentiated adenocarcinoma. On 11/26/19 he had a left colectomy with partial gastrectomy with Dr Liu, showing poorly differentiated adenocarcinoma invading gastric wall and visceral peritoneum and with 3/14 LNs+. Due to his advanced age, no chemotherapy was offered. 3/2020 he was noted to be anemic, given IV iron infusion and referred back to Dr Liu. He had imaging at [facility] showing widespread intra-abdominal metastatic disease. Referred to [facility] to establish care    He was given 2 doses of injectafer 7/2020  He was started on Keytruda for MSI-H metastatic colon cancer on 7/2/20    Interval history:   Today, 11/10/2022, patient denies any acute concerns today.  He denies any symptoms of pain, decreased appetite, weight loss.  He reports compliance with Synthroid, 300 mcg 1st thing in the morning on an empty stomach.  He denies any symptoms of fatigue, constipation, blood in his stool, dark tarry stools.      PMHx: HTN, BPH, colon cancer, DVT, VALENTIN due to GI blood loss  PSHx: partial colectomy, cholecystectomy  Social Hx: former smoker quit 2010, 60 pack year, no ETOH, no drugs, prior  but no toxic exposures  Family Hx: mother: lung cancer, maternal aunt: breast cancer  Meds: reviewed  Allergies: NKDA    Labs:  11/8/22:  Creatinine 1.21, albumin 3.6, alkaline phosphatase 67, total bili 0.8, AST 17, ALT 20, magnesium 1.7, phosphorus 3.0, TSH 26, free  T4 0.4, cortisol 7.2, WBC count 8.6, hemoglobin 15.5, MCV 91.8, platelet count 329,        09/28/2022:  CEA 2.5, TSH 18, cortisol 13.2, Creatinine 1.25, albumin 3.5, LFTs within normal limits, WBC 9.72, hemoglobin 16, MCV 92, platelet count 358.    7/6/22 FT4 1.24, TSH 8.8, cortisol 13.6, Cr 1.31, ,  LFTs WNL, Alb 3.6, WBC 7.67, Hgb 15.9, , ANC 4.80    05/24/2022:  Sodium 137, potassium 4.4, creatinine 1.12, albumin 3.7, alk-phos 61, total bili 0.5, AST 27, ALT 37, CEA 3.2, TSH 28.4 , cortisol 11.1, WBC 10.11, hemoglobin 16.5, MCV 94.1, platelet count 311.     4/5/2022: 2.3, TSH 17.40, free T4 0.65, creatinine 1.10, WBC count 8.93, hemoglobin 16.0, MCV 93.0, platelet count 409  2/23/2022: CEA 2.3, TSH 19.17,Creatinine 1.12, WBC count 8.94, hemoglobin 14.7, platelet count 347  1/12/2022: CEA 2.4, TSH 21.56, creatinine 1.28, albumin 3.7, WBC count 9.08, hemoglobin 17.0, platelet count 346.  11/30/21 CEA 2.4, TSH 18.088, Cr 1.24, Alb 3.5, WBC 9.21, Hgb 15.9, , ANC 5.96  10/13/21 CEA 2.4 FT4 1.08 Cr 1.17 Alb 3.5 TP 7.2 TSH 2.8 WBC 10 Hg 15.8  ANC 6.29  9/1/21 CEA 1.9, TSH 3.2, Cr 1.37, Alb 3.4, TP 7.2, FT4 1.03, WBC 9.01, Hgb 15.5, , ANC 5.75  7/21/21 CEA 2.2 TSH 6.84 Cr 1.27 Alb 3.5 TP 7.2 WBC 8.41 Hg 15.9  ANC 5.44  6/9/21 Cr 1.26, Alb 3.6, TP 7.5, AST 21, ALT 26, CEA 2.3, TSH 12.48, WBC 9.20, Hgb 15.4,   4/28/21 Cr 1.14 Alb 3.7 TP 7.2 AST 20 ALT 26 TSH 32.366 WBC 9.56 Hg 15.5   3/17/21 Cr 1.37, Alb 3.9, AST 22, ALT 24, CEA 3.8, TSH 33.6, WBC 8.34, Hgb 15.0, , ANC 5.25  2/3/21 Cr 1.43 Alb 3.9 AST 59 ALT 39 CEA 3.4 TSH 55.6 WBC 9.19 Hg 15.6  ANC 5.16 t3 UPTAKE 25.4 tt4 <3 Ferritin 116  12/24/20 Cr 1.09, TP 7.0, ALB 3.6, TSH 35.73, ferritin 66, WBC 8.70, Hg 16.3, , ANC 5.0, CEA 2.3  12/2/20 WBC 7.81 Hg 16.2 MCV 88.9 RDW 16  ANC 4.57 Cr 0.96 Alb 3.7, CEA 2.3  11/4/20 Cr 0.87, TP 7.4, albumin 3.7, WBC 8.43, Hg 17.0, , ANC 5.30,  ferritin 47, TSH 3.52, CEA 2.3  10/14/20 WBC 7.98 Hg 16 MCV 85.2  ANC 4.55 Cr 0.94 Alb 3.5  20 WBC 6.83 Hg 14.8  Alb 3.4 Cr 0.93  20 WBC 7.77 Hg 14.4 MCV 82.6 RDW 24  ANC 4.51 Cr 0.91 Alb 3.6 AST 18 ALT 23 CEA 2 TSH 2 Ferritin 60  20 Cr 0.94 Alb 3.6 WBC 7.93 Hg 14.4   20 Cr 0.8 Ca 8.78 AST 14 ALT 12 CEA 1.6 Alkphos 79 Alb 3.5 TSH 1.15 Iron 42 TIBC 289 Ferritin 512 WBC 6.17 Hg 12.4  MCV 80.2 ANC 3.52  20 WBC 6.33 Hg 6.4 MCV 75.3 RDW 22.6   20 Cr 0.78 Alb 3.1  19 CEA 1.6    Imagin2022 CT chest abdomen pelvis with contrast:  No evidence of metastatic disease.  No significant change since prior study in the chest or abdomen.    2022 CT chest abdomen pelvis with contrast:  No evidence of metastatic disease or other significant change since previous CT dated 2021.      21 CT C/A/P: stable chest CT with no evidence of metastatic disease. No evidence of metastatic disease or other significant changes in abdomen/pelvis since prior CT    21 CT C/A/P w/ IV contrast: no evidence of metastatic disease in chest. No evidence of neoplastic disease within abd or pelvis. Fatty liver has worsened. Prostate enlargement. Multiple bladder calculi. Distal colonic diverticulosis. Atherosclerosis.    3/16/21 CT C/A/P: No significant changes and no evidence of metastatic disease in the chest. Decrease in the size of the small soft tissue density nodules in the left upper quadrant. No other significant changes.    20 CT C/A/P w/ IV contrast: no evidence of thoracic metastatic disease. Left upper quadrant abd wall thickening has decreased slightly. Small area of soft tissue density seen in left upper quadrant adjacent to colon. Measures minimally smaller than on prior exam. Additional area of soft tissue density adjacent to stomach and pancreatic tail has not changed. Overall, several small soft tissue masses remain with left  abdomen and left abdominal wall, few of those minimally decreased in size from prior scan.    9/18/20 CT C/A/P w/ IV contrast: no evidence of thoracic metastatic disease. Significant decrease in size of previously described masses within abd wall on the left. Residual mild localized soft tissue thickening of upper anterior abd wall to the left of midline adjacent to left lobe of liver at site of previously demonstrated mass. Residual oval shaped soft tissue mass more caudally on the left [id number] suspicious for residual tumor. Also residual mass in left upper quadrant between gastric body and tAil of pancreas that has also decreased in size and remains contiguous with gastric wall. Omental mass decreased to 3cm.    6/9/20 CT C/A/P w/ IV contrast: no significant cardiopulmonary abnormality. Subtle nodularity in right lobe of thyroid lobe. Liver unremarkable. Several solid lesions involving the abdominal wall. 3.9cm lesion in upper abd wall to left of midline. 4.9cm lesion at more caudal level. Several solid lesions are seen within omental fat of left upper quadrant. Portion of this appears to be attached to stomach.    4/9/20 RLE venous duplex: occlusive DVT right popliteal vein.    11/15/19 CT A/P w/ IV contrast: colonic mass at splenic flexure 7.6x4.3cm, contiguous with the stomach. Numerous diverticula in descending and sigmoid colon    Path:  11/26/19 left colectomy with partial gastrectomy: tumor site splenic flexure, greatest dimension 8.5cm, no perforation, adenocarcinoma, mod to poorly differentiated, tumor invades visceral peritoneum and directly invades gastric wall. Margins neg. +LVI, +PNI, no tumor deposits. 3/14 LNs+.  [id number]    11/15/19 colon 60cm biopsy: poorly differentiated adenocarcinoma.  MLH1 loss of expression. MSH2 and MSH6 no loss of expression, PMS2 loss of expression. 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   CVS: no chest pain, no palpitations, no dyspnea on exertion  RESP: no shortness of breath, no hemoptysis, no cough  GI: no nausea, no vomiting, no diarrhea, no constipation, no melena, no hematochezia, no hematemesis, no abdominal pain, no increase in abdominal girth  : no dysuria, no hematuria, no hesitancy, no scrotal swelling, no discharge  INTEGUMENT: no rashes, no abnormal bruising, no nail pitting, no hyperpigmentation  NEURO: no falls, no memory loss, no paresthesias or dysesthesias, no urofecal incontinence or retention, no loss of strength on any extremity  MSK: no back pain, no new joint pain, no joint swelling, no muscle weakness  PSYCH: no suicidal or homicidal ideation, no depression, no insomnia, no anhedonia  ENDOCRINE: no heat or cold intolerance, no polyuria, no polydipsia    Objective:     Physical Exam  BP  154/90      BP Location  Left arm     Patient Position  Sitting     BP Method  Large (Automatic)     Pulse  68     Resp  16     Temp  97.5 F (36.4 C)     Temp src  Oral     SpO2  93%     Weight   126 kg (277 lb 12.5 oz)     Height  5' 8" (1.727 m)     Pain Score  0-No pain      GA: AAOx3, very pleasant gentleman with a joking disposition. NAD  HEENT: NCAT, PERRL, EOMI, left eye ectropion, Oropharynx moist, fair dentition. no oral ulcers, a soft mass felt in the upper neck, mobile, nontender, boggy.   LYMPH: no cervical, axillary or supraclavicular adenopathy  CVS: s1s2 RRR, no M/R/G  RESP: Lung sounds clear to auscultation. No wheezing or other adventitious sounds.   ABD: soft, NT, ND, BS+, no hepatosplenomegaly. Well-healed incision, left side. Hernia present just lateral of incision. Soft and non-tender, no pressure.   EXT: no deformities, b/l " pedal edema 1+  SKIN:  Warm, dry. No rashes or bruising. No jaundice.   NEURO: normal mentation, strength 5/5 on all 4 extremities, no sensory deficits     Assessment:       Problem List Items Addressed This Visit          Oncology    Colon cancer metastasized to multiple sites - Primary    Encounter for antineoplastic chemotherapy     1. 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  Keytruda started on 7/2/20. Changed to q6week on 12/23/20  CT C/A/P on 7/16/21 and 11/2021 essentially ERIC.  CT chest abdomen pelvis in April 2022 with ERIC  Previously discussed with patient and daughter the options to continue close surveillance versus restart treatment with pembrolizumab given patient's long history of ERIC and almost 2 years of treatment with pembrolizumab.  Patient and daughter would like to continue pembrolizumab with plans to decrease dose or increase the dosing interval if he were to have recurrent side effects from treatment.  Will plan to obtain imaging q4-6 months or as clinically indicated       Plan:   # proceed with next cycle of Keytruda 400 mg Q 6 weeks today  # C/w Synthroid 300 mcg q.day. Patient will be following with his endocrinologist soon to help manage his hypothyroidism.  # ultrasound soft tissue neck prior to next follow-up visit  # plan to follow-up in clinic in 6 weeks with repeat CBC, CMP, CEA, TSH, FT4 and random cortisol.      Portions of the record may have been created with voice recognition software. Occasional wrong-word or sound-a-like  substitutions may have occurred due to the inherent limitations of voice recognition software. Read the chart carefully and recognize, using context, where substitutions have occurred.

## 2022-12-22 ENCOUNTER — OFFICE VISIT (OUTPATIENT)
Dept: HEMATOLOGY/ONCOLOGY | Facility: CLINIC | Age: 87
End: 2022-12-22
Payer: MEDICARE

## 2022-12-22 VITALS
SYSTOLIC BLOOD PRESSURE: 114 MMHG | WEIGHT: 288 LBS | OXYGEN SATURATION: 94 % | BODY MASS INDEX: 45.2 KG/M2 | HEIGHT: 67 IN | TEMPERATURE: 98 F | DIASTOLIC BLOOD PRESSURE: 74 MMHG | RESPIRATION RATE: 18 BRPM | HEART RATE: 62 BPM

## 2022-12-22 DIAGNOSIS — C18.9 COLON CANCER METASTASIZED TO MULTIPLE SITES: Primary | ICD-10-CM

## 2022-12-22 DIAGNOSIS — E03.9 HYPOTHYROIDISM, UNSPECIFIED TYPE: ICD-10-CM

## 2022-12-22 PROCEDURE — 99215 OFFICE O/P EST HI 40 MIN: CPT | Mod: ,,, | Performed by: NURSE PRACTITIONER

## 2022-12-22 PROCEDURE — 99215 PR OFFICE/OUTPT VISIT, EST, LEVL V, 40-54 MIN: ICD-10-PCS | Mod: ,,, | Performed by: NURSE PRACTITIONER

## 2022-12-22 NOTE — PROGRESS NOTES
Subjective:       Patient ID: Stefano Mccoy is a 88 y.o. male.    Chief Complaint:  Pt stated he felt weak yesterday but much better today    History of Present Illness  Chief complaint: Metastatic colon cancer, MSI-H    Onc Hx:  7/2/20-7/9/20: injectafer x2  7/2/20-current: keytruda    HPI: 86 y/o M w/ PMHx of HTN, DVT postop after colectomy (was on Xarelto 11/2019-6/2020, Xarelto stopped due to GI bleed and anemia requiring frequent transfusions), VALENTIN due to GI blood loss, BPH first presented with hematochezia 10-11/2019. He had a colonoscopy with Dr Lima that showed a mass at 60cm, biopsy with poorly differentiated adenocarcinoma. On 11/26/19 he had a left colectomy with partial gastrectomy with Dr Liu, showing poorly differentiated adenocarcinoma invading gastric wall and visceral peritoneum and with 3/14 LNs+. Due to his advanced age, no chemotherapy was offered. 3/2020 he was noted to be anemic, given IV iron infusion and referred back to Dr Liu. He had imaging at [facility] showing widespread intra-abdominal metastatic disease. Referred to [facility] [place] to establish care    He was given 2 doses of injectafer 7/2020  He was started on Keytruda for MSI-H metastatic colon cancer on 7/2/20    Interval history:   Today, 12/22/2023: Patient presents to clinic today for a 6 week follow up and treatment visit.   He is accompanied by his daughter.   States overall for past 6 weeks has been feeling well. Did feel weak yesterday, not sure why, but much better today.   Daughter states he thought he was taking his levothyroxine, but was taking tamsulosin every day for the past 2-3 weeks.  He just restarted taking his levothyroxine this past Monday.  300 mcg per day per daughter report.  He does take the medication on an empty stomach by itself in the morning.  Reports chronic fatigue, unchanged.  Denies any abdominal pain, diarrhea, constipation, irritability, chest pain, heart beat irregularity, shortness of breath or cough.   No bleeding.      PMHx: HTN, BPH, colon cancer, DVT, VALENTIN due to GI blood loss  PSHx: partial colectomy, cholecystectomy  Social Hx: former smoker quit 2010, 60 pack year, no ETOH, no drugs, prior  but no toxic exposures  Family Hx: mother: lung cancer, maternal aunt: breast cancer  Meds: reviewed  Allergies: NKDA    Labs:  12/21/2022:  TSH 57, free T4 0.5, AST 61, ALT 24, alk-phos 51, total bili 0.8, cortisol 12.1, WBC 5.56, hemoglobin 13.9, platelets 290, ANC 3.28  11/8/22:  Creatinine 1.21, albumin 3.6, alkaline phosphatase 67, total bili 0.8, AST 17, ALT 20, magnesium 1.7, phosphorus 3.0, TSH 26, free T4 0.4, cortisol 7.2, WBC count 8.6, hemoglobin 15.5, MCV 91.8, platelet count 329,     09/28/2022:  CEA 2.5, TSH 18, cortisol 13.2, Creatinine 1.25, albumin 3.5, LFTs within normal limits, WBC 9.72, hemoglobin 16, MCV 92, platelet count 358.    7/6/22 FT4 1.24, TSH 8.8, cortisol 13.6, Cr 1.31, ,  LFTs WNL, Alb 3.6, WBC 7.67, Hgb 15.9, , ANC 4.80    05/24/2022:  Sodium 137, potassium 4.4, creatinine 1.12, albumin 3.7, alk-phos 61, total bili 0.5, AST 27, ALT 37, CEA 3.2, TSH 28.4 , cortisol 11.1, WBC 10.11, hemoglobin 16.5, MCV 94.1, platelet count 311.     4/5/2022: 2.3, TSH 17.40, free T4 0.65, creatinine 1.10, WBC count 8.93, hemoglobin 16.0, MCV 93.0, platelet count 409  2/23/2022: CEA 2.3, TSH 19.17,Creatinine 1.12, WBC count 8.94, hemoglobin 14.7, platelet count 347  1/12/2022: CEA 2.4, TSH 21.56, creatinine 1.28, albumin 3.7, WBC count 9.08, hemoglobin 17.0, platelet count 346.  11/30/21 CEA 2.4, TSH 18.088, Cr 1.24, Alb 3.5, WBC 9.21, Hgb 15.9, , ANC 5.96  10/13/21 CEA 2.4 FT4 1.08 Cr 1.17 Alb 3.5 TP 7.2 TSH 2.8 WBC 10 Hg 15.8  ANC 6.29  9/1/21 CEA 1.9, TSH 3.2, Cr 1.37, Alb 3.4, TP 7.2, FT4 1.03, WBC 9.01, Hgb 15.5, , ANC 5.75  7/21/21 CEA 2.2 TSH 6.84 Cr 1.27 Alb 3.5 TP 7.2 WBC 8.41 Hg 15.9  ANC 5.44  6/9/21 Cr 1.26, Alb 3.6, TP 7.5, AST 21, ALT 26, CEA  2.3, TSH 12.48, WBC 9.20, Hgb 15.4,   21 Cr 1.14 Alb 3.7 TP 7.2 AST 20 ALT 26 TSH 32.366 WBC 9.56 Hg 15.5   3/17/21 Cr 1.37, Alb 3.9, AST 22, ALT 24, CEA 3.8, TSH 33.6, WBC 8.34, Hgb 15.0, , ANC 5.25  2/3/21 Cr 1.43 Alb 3.9 AST 59 ALT 39 CEA 3.4 TSH 55.6 WBC 9.19 Hg 15.6  ANC 5.16 t3 UPTAKE 25.4 tt4 <3 Ferritin 116  20 Cr 1.09, TP 7.0, ALB 3.6, TSH 35.73, ferritin 66, WBC 8.70, Hg 16.3, , ANC 5.0, CEA 2.3  20 WBC 7.81 Hg 16.2 MCV 88.9 RDW 16  ANC 4.57 Cr 0.96 Alb 3.7, CEA 2.3  20 Cr 0.87, TP 7.4, albumin 3.7, WBC 8.43, Hg 17.0, , ANC 5.30, ferritin 47, TSH 3.52, CEA 2.3  10/14/20 WBC 7.98 Hg 16 MCV 85.2  ANC 4.55 Cr 0.94 Alb 3.5  20 WBC 6.83 Hg 14.8  Alb 3.4 Cr 0.93  20 WBC 7.77 Hg 14.4 MCV 82.6 RDW 24  ANC 4.51 Cr 0.91 Alb 3.6 AST 18 ALT 23 CEA 2 TSH 2 Ferritin 60  20 Cr 0.94 Alb 3.6 WBC 7.93 Hg 14.4   20 Cr 0.8 Ca 8.78 AST 14 ALT 12 CEA 1.6 Alkphos 79 Alb 3.5 TSH 1.15 Iron 42 TIBC 289 Ferritin 512 WBC 6.17 Hg 12.4  MCV 80.2 ANC 3.52  20 WBC 6.33 Hg 6.4 MCV 75.3 RDW 22.6   20 Cr 0.78 Alb 3.1  19 CEA 1.6    Imagin2022:  Ultrasound thyroid.  Thyroid tissue not clearly identified, although thyroid gland may be diffusely heterogenous, although with no gross focal thyroid nodule identified.  2022 CT chest abdomen pelvis with contrast:  No evidence of metastatic disease.  No significant change since prior study in the chest or abdomen.    2022 CT chest abdomen pelvis with contrast:  No evidence of metastatic disease or other significant change since previous CT dated 2021.      21 CT C/A/P: stable chest CT with no evidence of metastatic disease. No evidence of metastatic disease or other significant changes in abdomen/pelvis since prior CT    21 CT C/A/P w/ IV contrast: no evidence of metastatic disease in chest. No evidence of neoplastic  disease within abd or pelvis. Fatty liver has worsened. Prostate enlargement. Multiple bladder calculi. Distal colonic diverticulosis. Atherosclerosis.    3/16/21 CT C/A/P: No significant changes and no evidence of metastatic disease in the chest. Decrease in the size of the small soft tissue density nodules in the left upper quadrant. No other significant changes.    11/30/20 CT C/A/P w/ IV contrast: no evidence of thoracic metastatic disease. Left upper quadrant abd wall thickening has decreased slightly. Small area of soft tissue density seen in left upper quadrant adjacent to colon. Measures minimally smaller than on prior exam. Additional area of soft tissue density adjacent to stomach and pancreatic tail has not changed. Overall, several small soft tissue masses remain with left abdomen and left abdominal wall, few of those minimally decreased in size from prior scan.    9/18/20 CT C/A/P w/ IV contrast: no evidence of thoracic metastatic disease. Significant decrease in size of previously described masses within abd wall on the left. Residual mild localized soft tissue thickening of upper anterior abd wall to the left of midline adjacent to left lobe of liver at site of previously demonstrated mass. Residual oval shaped soft tissue mass more caudally on the left [id number] suspicious for residual tumor. Also residual mass in left upper quadrant between gastric body and tAil of pancreas that has also decreased in size and remains contiguous with gastric wall. Omental mass decreased to 3cm.    6/9/20 CT C/A/P w/ IV contrast: no significant cardiopulmonary abnormality. Subtle nodularity in right lobe of thyroid lobe. Liver unremarkable. Several solid lesions involving the abdominal wall. 3.9cm lesion in upper abd wall to left of midline. 4.9cm lesion at more caudal level. Several solid lesions are seen within omental fat of left upper quadrant. Portion of this appears to be attached to stomach.    4/9/20 RLE venous  duplex: occlusive DVT right popliteal vein.    11/15/19 CT A/P w/ IV contrast: colonic mass at splenic flexure 7.6x4.3cm, contiguous with the stomach. Numerous diverticula in descending and sigmoid colon    Path:  11/26/19 left colectomy with partial gastrectomy: tumor site splenic flexure, greatest dimension 8.5cm, no perforation, adenocarcinoma, mod to poorly differentiated, tumor invades visceral peritoneum and directly invades gastric wall. Margins neg. +LVI, +PNI, no tumor deposits. 3/14 LNs+.  [id number]    11/15/19 colon 60cm biopsy: poorly differentiated adenocarcinoma.  MLH1 loss of expression. MSH2 and MSH6 no loss of expression, PMS2 loss of expression. MSI-H both by MMR IHC and MSI PCR     Review of Systems:  CONSTITUTIONAL: no fevers, no chills, no weight loss, + chronic fatigue.   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  no tinnitus or hearing loss, no nose bleeding, no dysphagia, no odynophagia, no dental pain.   CVS: no chest pain, no palpitations, no dyspnea on exertion  RESP: no shortness of breath, no hemoptysis, no cough  GI: no nausea, no vomiting, no diarrhea, no constipation, no melena, no hematochezia, no hematemesis, no abdominal pain, no increase in abdominal girth  : no dysuria, no hematuria, no hesitancy, no scrotal swelling, no discharge  INTEGUMENT: no rashes, no abnormal bruising, no nail pitting, no hyperpigmentation  NEURO: no falls, no memory loss, no paresthesias or dysesthesias, no urofecal incontinence or retention, no loss of strength on any extremity  MSK: no back pain, no new joint pain, no joint swelling, no muscle weakness  PSYCH: no suicidal or homicidal ideation, no depression, no insomnia, no anhedonia  ENDOCRINE: no heat or cold intolerance, no polyuria, no polydipsia    Objective:     Physical Exam  Vitals:  Blood pressure  "114/74, pulse 62, temperature 97.8 °F (36.6 °C), temperature source Oral, resp. rate 18, height 5' 7" (1.702 m), weight 130.6 kg (288 lb), SpO2 (!) 94 %.   GA: AAOx3, very pleasant gentleman.  Appears in no acute distress.    HEENT: NCAT, PERRL, EOMI, left eye ectropion, Oropharynx moist, fair dentition. no oral ulcers.  LYMPH: no cervical, axillary or supraclavicular adenopathy  CVS: s1s2 RRR, no M/R/G  RESP: Lung sounds clear to auscultation. No wheezing or other adventitious sounds.   ABD: soft, NT, ND, BS+, no hepatosplenomegaly. Well-healed incision, left side. Hernia present just lateral of incision. Soft and non-tender, no pressure.   EXT: no deformities, b/l pedal edema 1+  SKIN:  Warm, dry. No rashes or bruising. No jaundice.   NEURO: normal mentation, strength 5/5 on all 4 extremities, no sensory deficits     Assessment:   1. 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  Keytruda started on 7/2/20. Changed to q6week on 12/23/20  CT C/A/P on 7/16/21 and 11/2021 essentially ERIC.  CT chest abdomen pelvis in April 2022 with ERIC  Dr. Bustillo, previously discussed with patient and daughter the options to continue close surveillance versus restart treatment with pembrolizumab given patient's long history of ERIC and almost 2 years of treatment with pembrolizumab.  Patient and daughter would like to continue pembrolizumab with plans to decrease dose or increase the dosing interval if he were to have recurrent side effects from treatment.  Will plan to obtain imaging " q4-6 months or as clinically indicated    12/22/2022:  Patient is feeling well.  Tolerating pembrolizumab without adverse reactions.  Laboratory studies reviewed and are stable with the exception of TSH.    Hypothyroidism.  TSH elevated with low free T4.  Secondary to patient not taking his levothyroxine for the past 2-3 weeks.  He has some chronic fatigue and dry skin, unchanged otherwise denies any symptoms of hypothyroidism.  Thyroid ultrasound was relatively unremarkable.     Plan:   # proceed with next cycle of Keytruda 400 mg Q 6 weeks today  # C/w Synthroid 300 mcg q.day. daughter is to call and schedule an appointment with the endocrinologist, prior to the next visit here. To help manage his hypothyroidism.    # plan to follow-up in clinic in 6 weeks with repeat CBC, CMP, CEA, TSH, FT4 and random cortisol.      Zoila Barbosa NP-C    Answers submitted by the patient for this visit:  Review of Systems Questionnaire (Submitted on 12/21/2022)  appetite change : No  unexpected weight change: No  mouth sores: No  visual disturbance: No  cough: No  shortness of breath: No  chest pain: No  abdominal pain: No  diarrhea: No  frequency: No  back pain: No  rash: No  headaches: No  adenopathy: No  nervous/ anxious: No

## 2023-02-02 ENCOUNTER — OFFICE VISIT (OUTPATIENT)
Dept: HEMATOLOGY/ONCOLOGY | Facility: CLINIC | Age: 88
End: 2023-02-02
Payer: MEDICARE

## 2023-02-02 VITALS
BODY MASS INDEX: 43.29 KG/M2 | DIASTOLIC BLOOD PRESSURE: 80 MMHG | TEMPERATURE: 97 F | HEART RATE: 60 BPM | HEIGHT: 67 IN | RESPIRATION RATE: 18 BRPM | WEIGHT: 275.81 LBS | SYSTOLIC BLOOD PRESSURE: 152 MMHG | OXYGEN SATURATION: 95 %

## 2023-02-02 DIAGNOSIS — C18.9 COLON CANCER METASTASIZED TO MULTIPLE SITES: Primary | ICD-10-CM

## 2023-02-02 PROCEDURE — 99215 OFFICE O/P EST HI 40 MIN: CPT | Mod: ,,, | Performed by: NURSE PRACTITIONER

## 2023-02-02 PROCEDURE — 99215 PR OFFICE/OUTPT VISIT, EST, LEVL V, 40-54 MIN: ICD-10-PCS | Mod: ,,, | Performed by: NURSE PRACTITIONER

## 2023-02-02 RX ORDER — SODIUM CHLORIDE 0.9 % (FLUSH) 0.9 %
10 SYRINGE (ML) INJECTION
Status: CANCELLED | OUTPATIENT
Start: 2023-02-02

## 2023-02-02 RX ORDER — EPINEPHRINE 0.3 MG/.3ML
0.3 INJECTION SUBCUTANEOUS ONCE AS NEEDED
Status: CANCELLED | OUTPATIENT
Start: 2023-02-02

## 2023-02-02 RX ORDER — HEPARIN 100 UNIT/ML
500 SYRINGE INTRAVENOUS
Status: CANCELLED | OUTPATIENT
Start: 2023-02-02

## 2023-02-02 RX ORDER — DIPHENHYDRAMINE HYDROCHLORIDE 50 MG/ML
50 INJECTION INTRAMUSCULAR; INTRAVENOUS ONCE AS NEEDED
Status: CANCELLED | OUTPATIENT
Start: 2023-02-02

## 2023-02-02 NOTE — PROGRESS NOTES
Subjective:      Patient ID: Stefano Mccoy is a 88 y.o. male.    Chief Complaint:  None.     History of Present Illness  Chief complaint: Metastatic colon cancer, MSI-H    Onc Hx:  7/2/20-7/9/20: injectafer x2  7/2/20-current: keytruda    HPI: 88 y/o M w/ PMHx of HTN, DVT postop after colectomy (was on Xarelto 11/2019-6/2020, Xarelto stopped due to GI bleed and anemia requiring frequent transfusions), VALENTIN due to GI blood loss, BPH first presented with hematochezia 10-11/2019. He had a colonoscopy with Dr Lima that showed a mass at 60cm, biopsy with poorly differentiated adenocarcinoma. On 11/26/19 he had a left colectomy with partial gastrectomy with Dr Liu, showing poorly differentiated adenocarcinoma invading gastric wall and visceral peritoneum and with 3/14 LNs+. Due to his advanced age, no chemotherapy was offered. 3/2020 he was noted to be anemic, given IV iron infusion and referred back to Dr Liu. He had imaging at [facility] showing widespread intra-abdominal metastatic disease. Referred to [facility] to establish care    He was given 2 doses of injectafer 7/2020  He was started on Keytruda for MSI-H metastatic colon cancer on 7/2/20    Interval history:   02/02/2023: Patient presents to clinic today for a 6 week follow up and treatment visit. He is accompanied by his daughter.   States overall for past 6 weeks has been feeling well. He takes a nap daily in the afternoon. Just not a bundle of energy, unchanged   They have met with Dr. Shepherd(endocrinologist) and was advised to continue Levothyroxine 300 mcg daily.   Denies any abdominal pain, diarrhea, constipation, irritability, chest pain, heart beat irregularity, shortness of breath or cough.  No bleeding.    No fevers or infections.     PMHx: HTN, BPH, colon cancer, DVT, VALENTIN due to GI blood loss  PSHx: partial colectomy, cholecystectomy  Social Hx: former smoker quit 2010, 60 pack year, no ETOH, no drugs, prior  but no toxic exposures  Family Hx:  mother: lung cancer, maternal aunt: breast cancer  Meds: reviewed  Allergies: NKDA    Labs:  02/01/2023: TSH 5.0091, FT4 0.91, cortisol 9.5, creat 1.31, Normal LFTs. WBC 9.28, Hgb 15.3, , ANC 5.16  12/21/2022:  TSH 57, free T4 0.5, AST 61, ALT 24, alk-phos 51, total bili 0.8, cortisol 12.1, WBC 5.56, hemoglobin 13.9, platelets 290, ANC 3.28  11/8/22:  Creatinine 1.21, albumin 3.6, alkaline phosphatase 67, total bili 0.8, AST 17, ALT 20, magnesium 1.7, phosphorus 3.0, TSH 26, free T4 0.4, cortisol 7.2, WBC count 8.6, hemoglobin 15.5, MCV 91.8, platelet count 329,     09/28/2022:  CEA 2.5, TSH 18, cortisol 13.2, Creatinine 1.25, albumin 3.5, LFTs within normal limits, WBC 9.72, hemoglobin 16, MCV 92, platelet count 358.    7/6/22 FT4 1.24, TSH 8.8, cortisol 13.6, Cr 1.31, ,  LFTs WNL, Alb 3.6, WBC 7.67, Hgb 15.9, , ANC 4.80    05/24/2022:  Sodium 137, potassium 4.4, creatinine 1.12, albumin 3.7, alk-phos 61, total bili 0.5, AST 27, ALT 37, CEA 3.2, TSH 28.4 , cortisol 11.1, WBC 10.11, hemoglobin 16.5, MCV 94.1, platelet count 311.     4/5/2022: 2.3, TSH 17.40, free T4 0.65, creatinine 1.10, WBC count 8.93, hemoglobin 16.0, MCV 93.0, platelet count 409  2/23/2022: CEA 2.3, TSH 19.17,Creatinine 1.12, WBC count 8.94, hemoglobin 14.7, platelet count 347  1/12/2022: CEA 2.4, TSH 21.56, creatinine 1.28, albumin 3.7, WBC count 9.08, hemoglobin 17.0, platelet count 346.  11/30/21 CEA 2.4, TSH 18.088, Cr 1.24, Alb 3.5, WBC 9.21, Hgb 15.9, , ANC 5.96  10/13/21 CEA 2.4 FT4 1.08 Cr 1.17 Alb 3.5 TP 7.2 TSH 2.8 WBC 10 Hg 15.8  ANC 6.29  9/1/21 CEA 1.9, TSH 3.2, Cr 1.37, Alb 3.4, TP 7.2, FT4 1.03, WBC 9.01, Hgb 15.5, , ANC 5.75  7/21/21 CEA 2.2 TSH 6.84 Cr 1.27 Alb 3.5 TP 7.2 WBC 8.41 Hg 15.9  ANC 5.44  6/9/21 Cr 1.26, Alb 3.6, TP 7.5, AST 21, ALT 26, CEA 2.3, TSH 12.48, WBC 9.20, Hgb 15.4,   4/28/21 Cr 1.14 Alb 3.7 TP 7.2 AST 20 ALT 26 TSH 32.366 WBC 9.56 Hg 15.5 PLT  351  3/17/21 Cr 1.37, Alb 3.9, AST 22, ALT 24, CEA 3.8, TSH 33.6, WBC 8.34, Hgb 15.0, , ANC 5.25  2/3/21 Cr 1.43 Alb 3.9 AST 59 ALT 39 CEA 3.4 TSH 55.6 WBC 9.19 Hg 15.6  ANC 5.16 t3 UPTAKE 25.4 tt4 <3 Ferritin 116  20 Cr 1.09, TP 7.0, ALB 3.6, TSH 35.73, ferritin 66, WBC 8.70, Hg 16.3, , ANC 5.0, CEA 2.3  20 WBC 7.81 Hg 16.2 MCV 88.9 RDW 16  ANC 4.57 Cr 0.96 Alb 3.7, CEA 2.3  20 Cr 0.87, TP 7.4, albumin 3.7, WBC 8.43, Hg 17.0, , ANC 5.30, ferritin 47, TSH 3.52, CEA 2.3  10/14/20 WBC 7.98 Hg 16 MCV 85.2  ANC 4.55 Cr 0.94 Alb 3.5  20 WBC 6.83 Hg 14.8  Alb 3.4 Cr 0.93  20 WBC 7.77 Hg 14.4 MCV 82.6 RDW 24  ANC 4.51 Cr 0.91 Alb 3.6 AST 18 ALT 23 CEA 2 TSH 2 Ferritin 60  20 Cr 0.94 Alb 3.6 WBC 7.93 Hg 14.4   20 Cr 0.8 Ca 8.78 AST 14 ALT 12 CEA 1.6 Alkphos 79 Alb 3.5 TSH 1.15 Iron 42 TIBC 289 Ferritin 512 WBC 6.17 Hg 12.4  MCV 80.2 ANC 3.52  20 WBC 6.33 Hg 6.4 MCV 75.3 RDW 22.6   20 Cr 0.78 Alb 3.1  19 CEA 1.6    Imagin2022:  Ultrasound thyroid.  Thyroid tissue not clearly identified, although thyroid gland may be diffusely heterogenous, although with no gross focal thyroid nodule identified.  2022 CT chest abdomen pelvis with contrast:  No evidence of metastatic disease.  No significant change since prior study in the chest or abdomen.    2022 CT chest abdomen pelvis with contrast:  No evidence of metastatic disease or other significant change since previous CT dated 2021.      21 CT C/A/P: stable chest CT with no evidence of metastatic disease. No evidence of metastatic disease or other significant changes in abdomen/pelvis since prior CT    21 CT C/A/P w/ IV contrast: no evidence of metastatic disease in chest. No evidence of neoplastic disease within abd or pelvis. Fatty liver has worsened. Prostate enlargement. Multiple bladder calculi. Distal colonic  diverticulosis. Atherosclerosis.    3/16/21 CT C/A/P: No significant changes and no evidence of metastatic disease in the chest. Decrease in the size of the small soft tissue density nodules in the left upper quadrant. No other significant changes.    11/30/20 CT C/A/P w/ IV contrast: no evidence of thoracic metastatic disease. Left upper quadrant abd wall thickening has decreased slightly. Small area of soft tissue density seen in left upper quadrant adjacent to colon. Measures minimally smaller than on prior exam. Additional area of soft tissue density adjacent to stomach and pancreatic tail has not changed. Overall, several small soft tissue masses remain with left abdomen and left abdominal wall, few of those minimally decreased in size from prior scan.    9/18/20 CT C/A/P w/ IV contrast: no evidence of thoracic metastatic disease. Significant decrease in size of previously described masses within abd wall on the left. Residual mild localized soft tissue thickening of upper anterior abd wall to the left of midline adjacent to left lobe of liver at site of previously demonstrated mass. Residual oval shaped soft tissue mass more caudally on the left [id number] suspicious for residual tumor. Also residual mass in left upper quadrant between gastric body and tAil of pancreas that has also decreased in size and remains contiguous with gastric wall. Omental mass decreased to 3cm.    6/9/20 CT C/A/P w/ IV contrast: no significant cardiopulmonary abnormality. Subtle nodularity in right lobe of thyroid lobe. Liver unremarkable. Several solid lesions involving the abdominal wall. 3.9cm lesion in upper abd wall to left of midline. 4.9cm lesion at more caudal level. Several solid lesions are seen within omental fat of left upper quadrant. Portion of this appears to be attached to stomach.    4/9/20 RLE venous duplex: occlusive DVT right popliteal vein.    11/15/19 CT A/P w/ IV contrast: colonic mass at splenic flexure  "7.6x4.3cm, contiguous with the stomach. Numerous diverticula in descending and sigmoid colon    Path:  11/26/19 left colectomy with partial gastrectomy: tumor site splenic flexure, greatest dimension 8.5cm, no perforation, adenocarcinoma, mod to poorly differentiated, tumor invades visceral peritoneum and directly invades gastric wall. Margins neg. +LVI, +PNI, no tumor deposits. 3/14 LNs+.  pT4N1b    11/15/19 colon 60cm biopsy: poorly differentiated adenocarcinoma.  MLH1 loss of expression. MSH2 and MSH6 no loss of expression, PMS2 loss of expression. MSI-H both by MMR IHC and MSI PCR     Review of Systems:  CONSTITUTIONAL: no fevers, no chills, no weight loss, + chronic fatigue.   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  no tinnitus or hearing loss, no nose bleeding, no dysphagia, no odynophagia, no dental pain.   CVS: no chest pain, no palpitations, no dyspnea on exertion  RESP: no shortness of breath, no hemoptysis, no cough  GI: no nausea, no vomiting, no diarrhea, no constipation, no melena, no hematochezia, no hematemesis, no abdominal pain, no increase in abdominal girth  : no dysuria, no hematuria, no hesitancy, no scrotal swelling, no discharge  INTEGUMENT: no rashes, no abnormal bruising, no nail pitting, no hyperpigmentation  NEURO: no falls, no memory loss, no paresthesias or dysesthesias, no urofecal incontinence or retention, no loss of strength on any extremity  MSK: no back pain, no new joint pain, no joint swelling, no muscle weakness  PSYCH: no suicidal or homicidal ideation, no depression, no insomnia, no anhedonia  ENDOCRINE: no heat or cold intolerance, no polyuria, no polydipsia    Objective:     Physical Exam  Vitals:  Blood pressure (!) 152/80, pulse 60, temperature 97.4 °F (36.3 °C), temperature source Oral, resp. rate 18, height 5' 7" (1.702 " m), weight 125.1 kg (275 lb 12.8 oz), SpO2 95 %.  GA: AAOx3, very pleasant gentleman.  Appears in no acute distress.    HEENT: NCAT, PERRL, EOMI, left eye ectropion, Oropharynx moist, fair dentition. no oral ulcers.  LYMPH: no cervical, axillary or supraclavicular adenopathy  CVS: s1s2 RRR, no M/R/G  RESP: Lung sounds clear to auscultation. No wheezing or other adventitious sounds.   ABD: soft, NT, ND, BS+, no hepatosplenomegaly. Well-healed incision, left side. Hernia present just lateral of incision. Soft and non-tender, no pressure.   EXT: no deformities, no edema.   SKIN:  Warm, dry. No rashes or bruising. No jaundice.   NEURO: normal mentation, strength 5/5 on all 4 extremities, no sensory deficits     Assessment:   1. 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  Keytruda started on 7/2/20. Changed to q6week on 12/23/20  CT C/A/P on 7/16/21 and 11/2021 essentially ERIC.  CT chest abdomen pelvis in April 2022 with ERIC  Dr. Bustillo, previously discussed with patient and daughter the options to continue close surveillance versus restart treatment with pembrolizumab given patient's long history of ERIC and almost 2 years of treatment with pembrolizumab.  Patient and daughter would like to continue pembrolizumab with plans to decrease dose or increase the dosing interval if he were to have recurrent side effects from treatment.  Will plan to obtain imaging q4-6 months or as clinically indicated    02/02/2023: Patient is feeling well.  Tolerating pembrolizumab without  adverse reactions.  Laboratory studies reviewed and are stable.     Hypothyroidism.  TSH mild elevation. Normal free T4.  Suggests Subclinical hypothyroidism. He has some chronic fatigue unchanged otherwise denies any symptoms of hypothyroidism.  Thyroid ultrasound was relatively unremarkable. He has seen Endocrinologist and he recommended patient continue with current dose of Levothyroxine 300 mcg daily.      Plan:   # proceed with next cycle of Keytruda 400 mg Q 6 weeks today  # C/w Synthroid 300 mcg q.day. Follow up with endocrinologist(Dr. Shepherd) in 6 months.     # plan to follow-up in clinic in 6 weeks with repeat CBC, CMP, CEA, TSH, FT4 and random cortisol.      Zoila Barbosa, NP-C      Answers submitted by the patient for this visit:  Review of Systems Questionnaire (Submitted on 1/30/2023)  appetite change : No  unexpected weight change: No  mouth sores: No  visual disturbance: No  cough: No  shortness of breath: No  chest pain: No  abdominal pain: No  diarrhea: No  frequency: No  back pain: No  rash: No  headaches: No  adenopathy: No  nervous/ anxious: No

## 2023-03-16 ENCOUNTER — OFFICE VISIT (OUTPATIENT)
Dept: HEMATOLOGY/ONCOLOGY | Facility: CLINIC | Age: 88
End: 2023-03-16
Payer: MEDICARE

## 2023-03-16 VITALS
BODY MASS INDEX: 43.47 KG/M2 | WEIGHT: 277 LBS | HEIGHT: 67 IN | OXYGEN SATURATION: 94 % | SYSTOLIC BLOOD PRESSURE: 128 MMHG | RESPIRATION RATE: 18 BRPM | DIASTOLIC BLOOD PRESSURE: 77 MMHG | HEART RATE: 71 BPM | TEMPERATURE: 98 F

## 2023-03-16 DIAGNOSIS — C18.9 COLON CANCER METASTASIZED TO MULTIPLE SITES: Primary | ICD-10-CM

## 2023-03-16 DIAGNOSIS — N17.9 AKI (ACUTE KIDNEY INJURY): ICD-10-CM

## 2023-03-16 PROCEDURE — 99215 OFFICE O/P EST HI 40 MIN: CPT | Mod: ,,, | Performed by: STUDENT IN AN ORGANIZED HEALTH CARE EDUCATION/TRAINING PROGRAM

## 2023-03-16 PROCEDURE — 99215 PR OFFICE/OUTPT VISIT, EST, LEVL V, 40-54 MIN: ICD-10-PCS | Mod: ,,, | Performed by: STUDENT IN AN ORGANIZED HEALTH CARE EDUCATION/TRAINING PROGRAM

## 2023-03-16 RX ORDER — TAMSULOSIN HYDROCHLORIDE 0.4 MG/1
0.4 CAPSULE ORAL DAILY
COMMUNITY

## 2023-03-16 RX ORDER — PHENAZOPYRIDINE HYDROCHLORIDE 200 MG/1
200 TABLET, FILM COATED ORAL 3 TIMES DAILY
COMMUNITY
Start: 2023-03-10 | End: 2023-04-10

## 2023-03-16 RX ORDER — CEFDINIR 300 MG/1
300 CAPSULE ORAL 2 TIMES DAILY
COMMUNITY
Start: 2023-03-14 | End: 2023-04-10

## 2023-03-17 ENCOUNTER — TELEPHONE (OUTPATIENT)
Dept: HEMATOLOGY/ONCOLOGY | Facility: CLINIC | Age: 88
End: 2023-03-17
Payer: MEDICARE

## 2023-03-17 NOTE — TELEPHONE ENCOUNTER
Patient's daughter called and requesting and MRI of Brain while he is still in the hospital he should be out by Sunday. She is concern due to her dad being more confused, pulling out IV and asking what's all the wires for.  She states she does not want him to go home and end up back in the hospital.  She says she would like the MRI to know for sure. Please advise

## 2023-03-21 ENCOUNTER — PATIENT OUTREACH (OUTPATIENT)
Dept: ADMINISTRATIVE | Facility: CLINIC | Age: 88
End: 2023-03-21
Payer: MEDICARE

## 2023-03-21 ENCOUNTER — EXTERNAL HOSPITAL ADMISSION (OUTPATIENT)
Dept: ADMINISTRATIVE | Facility: CLINIC | Age: 88
End: 2023-03-21
Payer: MEDICARE

## 2023-03-28 ENCOUNTER — TELEPHONE (OUTPATIENT)
Dept: HEMATOLOGY/ONCOLOGY | Facility: CLINIC | Age: 88
End: 2023-03-28

## 2023-03-28 NOTE — TELEPHONE ENCOUNTER
Pt's daughter reports currently on Amoxil 875mg for UTI and will finish this Friday 3/31. Instructed will need to reschedule appt for 3/30. When should Pt return?

## 2023-04-10 ENCOUNTER — OFFICE VISIT (OUTPATIENT)
Dept: HEMATOLOGY/ONCOLOGY | Facility: CLINIC | Age: 88
End: 2023-04-10
Payer: MEDICARE

## 2023-04-10 VITALS
BODY MASS INDEX: 38.06 KG/M2 | DIASTOLIC BLOOD PRESSURE: 80 MMHG | OXYGEN SATURATION: 97 % | HEART RATE: 73 BPM | RESPIRATION RATE: 20 BRPM | HEIGHT: 67 IN | WEIGHT: 242.5 LBS | TEMPERATURE: 98 F | SYSTOLIC BLOOD PRESSURE: 129 MMHG

## 2023-04-10 DIAGNOSIS — N17.9 AKI (ACUTE KIDNEY INJURY): ICD-10-CM

## 2023-04-10 DIAGNOSIS — C18.9 COLON CANCER METASTASIZED TO MULTIPLE SITES: Primary | ICD-10-CM

## 2023-04-10 PROCEDURE — 99214 PR OFFICE/OUTPT VISIT, EST, LEVL IV, 30-39 MIN: ICD-10-PCS | Mod: ,,, | Performed by: INTERNAL MEDICINE

## 2023-04-10 PROCEDURE — 99214 OFFICE O/P EST MOD 30 MIN: CPT | Mod: ,,, | Performed by: INTERNAL MEDICINE

## 2023-04-10 RX ORDER — FINASTERIDE 5 MG/1
5 TABLET, FILM COATED ORAL DAILY
COMMUNITY

## 2023-04-10 NOTE — PROGRESS NOTES
Subjective:      Patient ID: Stefano Mccoy is a 88 y.o. male.    Chief Complaint:  None.     History of Present Illness  Chief complaint: Metastatic colon cancer, MSI-H    Onc Hx:  7/2/20-7/9/20: injectafer x2  7/2/20-current: keytruda    HPI: 86 y/o M w/ PMHx of HTN, DVT postop after colectomy (was on Xarelto 11/2019-6/2020, Xarelto stopped due to GI bleed and anemia requiring frequent transfusions), VALENTIN due to GI blood loss, BPH first presented with hematochezia 10-11/2019. He had a colonoscopy with Dr Lima that showed a mass at 60cm, biopsy with poorly differentiated adenocarcinoma. On 11/26/19 he had a left colectomy with partial gastrectomy with Dr Liu, showing poorly differentiated adenocarcinoma invading gastric wall and visceral peritoneum and with 3/14 LNs+. Due to his advanced age, no chemotherapy was offered. 3/2020 he was noted to be anemic, given IV iron infusion and referred back to Dr Liu. He had imaging at [facility] showing widespread intra-abdominal metastatic disease. Referred to [facility] to establish care    He was given 2 doses of injectafer 7/2020  He was started on Keytruda for MSI-H metastatic colon cancer on 7/2/20    Interval history:   Patient was in the ER over the weekend with difficulty with urination and was started on antibiotics for suspected UTI after having a Muhammad placed.  Patient's Muhammad was removed prior to discharge from the ER.    He had blood work done for today's visit where he was found to have JOSE with a creatinine of 7, repeat blood work today morning revealed a creatinine trending uptake 11.4.  03/16/2023, patient denies any acute concerns.  He reports good oral intake and denies any difficulty with urination.  He denies any flank pain, blood in his urine, new medications since hospital discharge.  Follow-up clinic visit April 10, 2023.    The patient's  treatment with pembrolizumab, was  omitted  on March 16th, because of  JOSE, which was secondary to a bladder stone.   Once the stone was passed, and a Muhammad was placed, his kidney function totally recover and a day ago the creatinine was 0.9, and the EGFR was over 60 mL/minute.  He came in with his daughter, who knows the patient is losing weight, his appetite is poor and he is subsisting on bananas and a ham sandwich once in awhile.  However he is 88 does not do much since he is fairly in mobile and I do not think these are symptoms to be too concerned.  Today we decided to wait until his treatment is due next which would be .  He still has a Muhammad catheter which maybe therefore over the next month or so            PMHx: HTN, BPH, colon cancer, DVT, VALENTIN due to GI blood loss  PSHx: partial colectomy, cholecystectomy  Social Hx: former smoker quit , 60 pack year, no ETOH, no drugs, prior  but no toxic exposures  Family Hx: mother: lung cancer, maternal aunt: breast cancer  Meds: reviewed  Allergies: NKDA    Labs:  April 10, 2023:  CMP dated 2023 is essentially normal except for a low albumin at 3.3 CBC is normal except for  abnormal indices , RBC and slight thrombocytosis  2023:  Creatinine 11.4, potassium 5.3, bicarb 20, sodium 131.  2023:  TSH 4.4, cortisol 13.6, CEA 2.2, creatinine 7.3, albumin 3.1, calcium 8.8, LFTs within normal limits, free T4 1.23, WBC count 10.2, hemoglobin 14.1, MCV 96.4, platelet count 3 1 7, ANC 7.82.  CEA 2.2      Imagin2022:  Ultrasound thyroid.  Thyroid tissue not clearly identified, although thyroid gland may be diffusely heterogenous, although with no gross focal thyroid nodule identified.  2022 CT chest abdomen pelvis with contrast:  No evidence of metastatic disease.  No significant change since prior study in the chest or abdomen.    2022 CT chest abdomen pelvis with contrast:  No evidence of metastatic disease or other significant change since previous CT dated 2021.      21 CT C/A/P: stable chest CT with no evidence of  metastatic disease. No evidence of metastatic disease or other significant changes in abdomen/pelvis since prior CT    7/16/21 CT C/A/P w/ IV contrast: no evidence of metastatic disease in chest. No evidence of neoplastic disease within abd or pelvis. Fatty liver has worsened. Prostate enlargement. Multiple bladder calculi. Distal colonic diverticulosis. Atherosclerosis.    3/16/21 CT C/A/P: No significant changes and no evidence of metastatic disease in the chest. Decrease in the size of the small soft tissue density nodules in the left upper quadrant. No other significant changes.    11/30/20 CT C/A/P w/ IV contrast: no evidence of thoracic metastatic disease. Left upper quadrant abd wall thickening has decreased slightly. Small area of soft tissue density seen in left upper quadrant adjacent to colon. Measures minimally smaller than on prior exam. Additional area of soft tissue density adjacent to stomach and pancreatic tail has not changed. Overall, several small soft tissue masses remain with left abdomen and left abdominal wall, few of those minimally decreased in size from prior scan.    9/18/20 CT C/A/P w/ IV contrast: no evidence of thoracic metastatic disease. Significant decrease in size of previously described masses within abd wall on the left. Residual mild localized soft tissue thickening of upper anterior abd wall to the left of midline adjacent to left lobe of liver at site of previously demonstrated mass. Residual oval shaped soft tissue mass more caudally on the left [id number] suspicious for residual tumor. Also residual mass in left upper quadrant between gastric body and tAil of pancreas that has also decreased in size and remains contiguous with gastric wall. Omental mass decreased to 3cm.    6/9/20 CT C/A/P w/ IV contrast: no significant cardiopulmonary abnormality. Subtle nodularity in right lobe of thyroid lobe. Liver unremarkable. Several solid lesions involving the abdominal wall. 3.9cm  lesion in upper abd wall to left of midline. 4.9cm lesion at more caudal level. Several solid lesions are seen within omental fat of left upper quadrant. Portion of this appears to be attached to stomach.    4/9/20 RLE venous duplex: occlusive DVT right popliteal vein.    11/15/19 CT A/P w/ IV contrast: colonic mass at splenic flexure 7.6x4.3cm, contiguous with the stomach. Numerous diverticula in descending and sigmoid colon    Path:  11/26/19 left colectomy with partial gastrectomy: tumor site splenic flexure, greatest dimension 8.5cm, no perforation, adenocarcinoma, mod to poorly differentiated, tumor invades visceral peritoneum and directly invades gastric wall. Margins neg. +LVI, +PNI, no tumor deposits. 3/14 LNs+.  pT4N1b    11/15/19 colon 60cm biopsy: poorly differentiated adenocarcinoma.  MLH1 loss of expression. MSH2 and MSH6 no loss of expression, PMS2 loss of expression. MSI-H both by MMR IHC and MSI PCR     Review of Systems:  CONSTITUTIONAL: no fevers, no chills, no weight loss, + chronic fatigue.   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  no tinnitus or hearing loss, no nose bleeding, no dysphagia, no odynophagia, no dental pain.   CVS: no chest pain, no palpitations, no dyspnea on exertion  RESP: no shortness of breath, no hemoptysis, no cough  GI: no nausea, no vomiting, no diarrhea, no constipation, no melena, no hematochezia, no hematemesis, no abdominal pain, no increase in abdominal girth  : no dysuria, no hematuria, no hesitancy, no scrotal swelling, no discharge  INTEGUMENT: no rashes, no abnormal bruising, no nail pitting, no hyperpigmentation  NEURO: no falls, no memory loss, no paresthesias or dysesthesias, no urofecal incontinence or retention, no loss of strength on any extremity  MSK: no back pain, no new joint pain, no joint swelling, no  muscle weakness  PSYCH: no suicidal or homicidal ideation, no depression, no insomnia, no anhedonia  ENDOCRINE: no heat or cold intolerance, no polyuria, no polydipsia    Objective:     Physical Exam:       GA: AAOx3, very pleasant elderly gentleman.  Appears in no acute distress.    HEENT: NCAT, PERRL, EOMI, left eye ectropion, Oropharynx moist, fair dentition. no oral ulcers.  LYMPH: no cervical, axillary or supraclavicular adenopathy  CVS: s1s2 RRR, no M/R/G  RESP: Lung sounds clear to auscultation. No wheezing or other adventitious sounds.   ABD: soft, NT, ND, BS+, no hepatosplenomegaly. Well-healed incision, left side. Hernia present just lateral of incision. Soft and non-tender, no pressure.   EXT: no deformities, no edema.   SKIN:  Warm, dry. No rashes or bruising. No jaundice.   NEURO: normal mentation, strength 5/5 on all 4 extremities, no sensory deficits     Assessment:   1. 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  Keytruda started on 7/2/20. Changed to q6week on 12/23/20  CT C/A/P on 7/16/21 and 11/2021 essentially ERIC.  CT chest abdomen pelvis in April 2022 with ERIC  Dr. Bustillo, previously discussed with patient and daughter the options to continue close surveillance versus restart treatment with pembrolizumab given patient's long history of ERIC and almost 2 years of treatment with pembrolizumab.  Patient and daughter would like to continue pembrolizumab with plans to decrease dose or increase the dosing interval if he were to have recurrent side  effects from treatment.  Will plan to obtain imaging q4-6 months or as clinically indicated       Plan:   The sudden rising creatinine was secondary to obstructive uropathy, and has totally resolved.    # C/w Synthroid 300 mcg q.day. Follow up with endocrinologist(Dr. Shepherd) in 6 months.     # plan to follow-up in clinic in 17 days with repeat CBC, CMP, CEA, TSH, FT4 and random cortisol, and next dose of pembrolizumab.    A total of  25 minutes were spent in review of records, interpretation of test, coordination of care, discussion and counseling with the patient.        Portions of the record may have been created with voice recognition software. Occasional wrong-word or sound-a-like substitutions may have occurred due to the inherent limitations of voice recognition software. Read the chart carefully and recognize, using context, where substitutions have occurred.     Edu Mendez MD

## 2023-04-27 ENCOUNTER — OFFICE VISIT (OUTPATIENT)
Dept: HEMATOLOGY/ONCOLOGY | Facility: CLINIC | Age: 88
End: 2023-04-27
Payer: MEDICARE

## 2023-04-27 VITALS
BODY MASS INDEX: 38.19 KG/M2 | RESPIRATION RATE: 18 BRPM | SYSTOLIC BLOOD PRESSURE: 105 MMHG | HEART RATE: 72 BPM | TEMPERATURE: 98 F | HEIGHT: 67 IN | WEIGHT: 243.31 LBS | DIASTOLIC BLOOD PRESSURE: 71 MMHG | OXYGEN SATURATION: 96 %

## 2023-04-27 DIAGNOSIS — C18.9 COLON CANCER METASTASIZED TO MULTIPLE SITES: Primary | ICD-10-CM

## 2023-04-27 PROCEDURE — 99214 PR OFFICE/OUTPT VISIT, EST, LEVL IV, 30-39 MIN: ICD-10-PCS | Mod: ,,, | Performed by: NURSE PRACTITIONER

## 2023-04-27 PROCEDURE — 99214 OFFICE O/P EST MOD 30 MIN: CPT | Mod: ,,, | Performed by: NURSE PRACTITIONER

## 2023-04-27 RX ORDER — HEPARIN 100 UNIT/ML
500 SYRINGE INTRAVENOUS
Status: CANCELLED | OUTPATIENT
Start: 2023-04-27

## 2023-04-27 RX ORDER — DIPHENHYDRAMINE HYDROCHLORIDE 50 MG/ML
50 INJECTION INTRAMUSCULAR; INTRAVENOUS ONCE AS NEEDED
Status: CANCELLED | OUTPATIENT
Start: 2023-04-27

## 2023-04-27 RX ORDER — SODIUM CHLORIDE 0.9 % (FLUSH) 0.9 %
10 SYRINGE (ML) INJECTION
Status: CANCELLED | OUTPATIENT
Start: 2023-04-27

## 2023-04-27 RX ORDER — EPINEPHRINE 0.3 MG/.3ML
0.3 INJECTION SUBCUTANEOUS ONCE AS NEEDED
Status: CANCELLED | OUTPATIENT
Start: 2023-04-27

## 2023-04-27 NOTE — PROGRESS NOTES
Subjective:      Patient ID: Stefaon Mccoy is a 88 y.o. male.    Chief Complaint:  None.     History of Present Illness  Chief complaint: Metastatic colon cancer, MSI-H    Onc Hx:  7/2/20-7/9/20: injectafer x2  7/2/20-current: keytruda    HPI: 86 y/o M w/ PMHx of HTN, DVT postop after colectomy (was on Xarelto 11/2019-6/2020, Xarelto stopped due to GI bleed and anemia requiring frequent transfusions), VALENTIN due to GI blood loss, BPH first presented with hematochezia 10-11/2019. He had a colonoscopy with Dr Lima that showed a mass at 60cm, biopsy with poorly differentiated adenocarcinoma. On 11/26/19 he had a left colectomy with partial gastrectomy with Dr Liu, showing poorly differentiated adenocarcinoma invading gastric wall and visceral peritoneum and with 3/14 LNs+. Due to his advanced age, no chemotherapy was offered. 3/2020 he was noted to be anemic, given IV iron infusion and referred back to Dr Liu. He had imaging at [facility] showing widespread intra-abdominal metastatic disease. Referred to [facility] to establish care    He was given 2 doses of injectafer 7/2020  He was started on Keytruda for MSI-H metastatic colon cancer on 7/2/20    Interval history:   Patient was in the ER over the weekend with difficulty with urination and was started on antibiotics for suspected UTI after having a Muhammad placed.  Patient's Muhammad was removed prior to discharge from the ER.    He had blood work done for today's visit where he was found to have JOSE with a creatinine of 7, repeat blood work today morning revealed a creatinine trending uptake 11.4.  03/16/2023, patient denies any acute concerns.  He reports good oral intake and denies any difficulty with urination.  He denies any flank pain, blood in his urine, new medications since hospital discharge.  Follow-up clinic visit April 10, 2023.    The patient's  treatment with pembrolizumab, was  omitted  on March 16th, because of  JOSE, which was secondary to a bladder stone.   Once the stone was passed, and a Muhammad was placed, his kidney function totally recover and a day ago the creatinine was 0.9, and the EGFR was over 60 mL/minute.  He came in with his daughter, who knows the patient is losing weight, his appetite is poor and he is subsisting on bananas and a ham sandwich once in awhile.  However he is 88 does not do much since he is fairly in mobile and I do not think these are symptoms to be too concerned.  Today we decided to wait until his treatment is due next which would be .  He still has a Muhammad catheter which maybe therefore over the next month or so            PMHx: HTN, BPH, colon cancer, DVT, VALENTIN due to GI blood loss  PSHx: partial colectomy, cholecystectomy  Social Hx: former smoker quit , 60 pack year, no ETOH, no drugs, prior  but no toxic exposures  Family Hx: mother: lung cancer, maternal aunt: breast cancer  Meds: reviewed  Allergies: NKDA    Labs:  April 10, 2023:  CMP dated 2023 is essentially normal except for a low albumin at 3.3 CBC is normal except for  abnormal indices , RBC and slight thrombocytosis  2023:  Creatinine 11.4, potassium 5.3, bicarb 20, sodium 131.  2023:  TSH 4.4, cortisol 13.6, CEA 2.2, creatinine 7.3, albumin 3.1, calcium 8.8, LFTs within normal limits, free T4 1.23, WBC count 10.2, hemoglobin 14.1, MCV 96.4, platelet count 3 1 7, ANC 7.82.  CEA 2.2      Imagin2022:  Ultrasound thyroid.  Thyroid tissue not clearly identified, although thyroid gland may be diffusely heterogenous, although with no gross focal thyroid nodule identified.  2022 CT chest abdomen pelvis with contrast:  No evidence of metastatic disease.  No significant change since prior study in the chest or abdomen.    2022 CT chest abdomen pelvis with contrast:  No evidence of metastatic disease or other significant change since previous CT dated 2021.      21 CT C/A/P: stable chest CT with no evidence of  metastatic disease. No evidence of metastatic disease or other significant changes in abdomen/pelvis since prior CT    7/16/21 CT C/A/P w/ IV contrast: no evidence of metastatic disease in chest. No evidence of neoplastic disease within abd or pelvis. Fatty liver has worsened. Prostate enlargement. Multiple bladder calculi. Distal colonic diverticulosis. Atherosclerosis.    3/16/21 CT C/A/P: No significant changes and no evidence of metastatic disease in the chest. Decrease in the size of the small soft tissue density nodules in the left upper quadrant. No other significant changes.    11/30/20 CT C/A/P w/ IV contrast: no evidence of thoracic metastatic disease. Left upper quadrant abd wall thickening has decreased slightly. Small area of soft tissue density seen in left upper quadrant adjacent to colon. Measures minimally smaller than on prior exam. Additional area of soft tissue density adjacent to stomach and pancreatic tail has not changed. Overall, several small soft tissue masses remain with left abdomen and left abdominal wall, few of those minimally decreased in size from prior scan.    9/18/20 CT C/A/P w/ IV contrast: no evidence of thoracic metastatic disease. Significant decrease in size of previously described masses within abd wall on the left. Residual mild localized soft tissue thickening of upper anterior abd wall to the left of midline adjacent to left lobe of liver at site of previously demonstrated mass. Residual oval shaped soft tissue mass more caudally on the left 1a3y7cm suspicious for residual tumor. Also residual mass in left upper quadrant between gastric body and tAil of pancreas that has also decreased in size and remains contiguous with gastric wall. Omental mass decreased to 3cm.    6/9/20 CT C/A/P w/ IV contrast: no significant cardiopulmonary abnormality. Subtle nodularity in right lobe of thyroid lobe. Liver unremarkable. Several solid lesions involving the abdominal wall. 3.9cm  lesion in upper abd wall to left of midline. 4.9cm lesion at more caudal level. Several solid lesions are seen within omental fat of left upper quadrant. Portion of this appears to be attached to stomach.    4/9/20 RLE venous duplex: occlusive DVT right popliteal vein.    11/15/19 CT A/P w/ IV contrast: colonic mass at splenic flexure 7.6x4.3cm, contiguous with the stomach. Numerous diverticula in descending and sigmoid colon    Path:  11/26/19 left colectomy with partial gastrectomy: tumor site splenic flexure, greatest dimension 8.5cm, no perforation, adenocarcinoma, mod to poorly differentiated, tumor invades visceral peritoneum and directly invades gastric wall. Margins neg. +LVI, +PNI, no tumor deposits. 3/14 LNs+.  pT4N1b    11/15/19 colon 60cm biopsy: poorly differentiated adenocarcinoma.  MLH1 loss of expression. MSH2 and MSH6 no loss of expression, PMS2 loss of expression. MSI-H both by MMR IHC and MSI PCR     Review of Systems:  CONSTITUTIONAL: no fevers, no chills, no weight loss, + chronic fatigue.   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  no tinnitus or hearing loss, no nose bleeding, no dysphagia, no odynophagia, no dental pain.   CVS: no chest pain, no palpitations, no dyspnea on exertion  RESP: no shortness of breath, no hemoptysis, no cough  GI: no nausea, no vomiting, no diarrhea, no constipation, no melena, no hematochezia, no hematemesis, no abdominal pain, no increase in abdominal girth  : no dysuria, no hematuria, no hesitancy, no scrotal swelling, no discharge  INTEGUMENT: no rashes, no abnormal bruising, no nail pitting, no hyperpigmentation  NEURO: no falls, no memory loss, no paresthesias or dysesthesias, no urofecal incontinence or retention, no loss of strength on any extremity  MSK: no back pain, no new joint pain, no joint swelling, no  muscle weakness  PSYCH: no suicidal or homicidal ideation, no depression, no insomnia, no anhedonia  ENDOCRINE: no heat or cold intolerance, no polyuria, no polydipsia    Objective:     Physical Exam:     GA: AAOx3, very pleasant elderly gentleman.  Appears in no acute distress.    HEENT: NCAT, PERRL, EOMI, left eye ectropion, color blind Oropharynx moist, fair dentition. no oral ulcers.  LYMPH: no cervical, axillary or supraclavicular adenopathy  CVS: s1s2 RRR, no M/R/G  RESP: Lung sounds clear to auscultation. No wheezing or other adventitious sounds.   ABD: soft, NT, ND, BS+, no hepatosplenomegaly. Well-healed incision, left side. Hernia present just lateral of incision. Soft and non-tender, no pressure.   EXT: no deformities, no edema.   SKIN:  Warm, dry. No rashes or bruising. No jaundice.   NEURO: normal mentation, strength 5/5 on all 4 extremities, no sensory deficits     Assessment:   1. 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  Keytruda started on 7/2/20. Changed to q6week on 12/23/20  CT C/A/P on 7/16/21 and 11/2021 essentially ERIC.  CT chest abdomen pelvis in April 2022 with ERIC  Dr. Bustillo, previously discussed with patient and daughter the options to continue close surveillance versus restart treatment with pembrolizumab given patient's long history of ERIC and almost 2 years of treatment with pembrolizumab.  Patient and daughter would like to continue pembrolizumab with plans to decrease dose or increase the dosing interval if he were to have  recurrent side effects from treatment.  Will plan to obtain imaging q4-6 months or as clinically indicated       Plan:   #Labs reviewed and discussed with patient ok to proceed with Keytruda every 6 weeks   The sudden rising creatinine was secondary to obstructive uropathy, and has totally resolved. - seen by urology last week, per pt daughter they will probably keep his moulton in  # C/w Synthroid 300 mcg q.day. Follow up with endocrinologist(Dr. Shepherd) in 6 months. , TSH WNL today 4/27/23    # plan to follow-up in clinic in 6 weeks with MD ,CT CAP prior to appointment   CBC, CMP, MG, Phos TSH, CEA       Pt instructed to call in the interim for any new or worsening concerns or symptoms       Cydney Tee, RIANC

## 2023-06-08 ENCOUNTER — OFFICE VISIT (OUTPATIENT)
Dept: HEMATOLOGY/ONCOLOGY | Facility: CLINIC | Age: 88
End: 2023-06-08
Payer: MEDICARE

## 2023-06-08 VITALS
HEIGHT: 67 IN | OXYGEN SATURATION: 94 % | TEMPERATURE: 97 F | SYSTOLIC BLOOD PRESSURE: 126 MMHG | DIASTOLIC BLOOD PRESSURE: 69 MMHG | BODY MASS INDEX: 39.91 KG/M2 | RESPIRATION RATE: 20 BRPM | HEART RATE: 66 BPM | WEIGHT: 254.31 LBS

## 2023-06-08 DIAGNOSIS — C18.9 COLON CANCER METASTASIZED TO MULTIPLE SITES: Primary | ICD-10-CM

## 2023-06-08 DIAGNOSIS — Z51.11 ENCOUNTER FOR ANTINEOPLASTIC CHEMOTHERAPY: ICD-10-CM

## 2023-06-08 PROCEDURE — 99215 OFFICE O/P EST HI 40 MIN: CPT | Mod: ,,, | Performed by: INTERNAL MEDICINE

## 2023-06-08 PROCEDURE — 99215 PR OFFICE/OUTPT VISIT, EST, LEVL V, 40-54 MIN: ICD-10-PCS | Mod: ,,, | Performed by: INTERNAL MEDICINE

## 2023-06-08 RX ORDER — SODIUM CHLORIDE 0.9 % (FLUSH) 0.9 %
10 SYRINGE (ML) INJECTION
Status: CANCELLED | OUTPATIENT
Start: 2023-06-08

## 2023-06-08 RX ORDER — HEPARIN 100 UNIT/ML
500 SYRINGE INTRAVENOUS
Status: CANCELLED | OUTPATIENT
Start: 2023-06-08

## 2023-06-08 RX ORDER — DIPHENHYDRAMINE HYDROCHLORIDE 50 MG/ML
50 INJECTION INTRAMUSCULAR; INTRAVENOUS ONCE AS NEEDED
Status: CANCELLED | OUTPATIENT
Start: 2023-06-08

## 2023-06-08 RX ORDER — EPINEPHRINE 0.3 MG/.3ML
0.3 INJECTION SUBCUTANEOUS ONCE AS NEEDED
Status: CANCELLED | OUTPATIENT
Start: 2023-06-08

## 2023-06-08 NOTE — PROGRESS NOTES
Subjective:      Patient ID: Stefano Mccoy is a 88 y.o. male.    Chief Complaint:  None.     History of Present Illness  Chief complaint: Metastatic colon cancer, MSI-H    Onc Hx:  7/2/20-7/9/20: injectafer x2  7/2/20-current: keytruda    HPI: 88 y/o M w/ PMHx of HTN, DVT postop after colectomy (was on Xarelto 11/2019-6/2020, Xarelto stopped due to GI bleed and anemia requiring frequent transfusions), VALENTIN due to GI blood loss, BPH first presented with hematochezia 10-11/2019. He had a colonoscopy with Dr Lima that showed a mass at 60cm, biopsy with poorly differentiated adenocarcinoma. On 11/26/19 he had a left colectomy with partial gastrectomy with Dr Liu, showing poorly differentiated adenocarcinoma invading gastric wall and visceral peritoneum and with 3/14 LNs+. Due to his advanced age, no chemotherapy was offered. 3/2020 he was noted to be anemic, given IV iron infusion and referred back to Dr Liu. He had imaging at [facility] showing widespread intra-abdominal metastatic disease. Referred to [facility] to establish care    He was given 2 doses of injectafer 7/2020  He was started on Keytruda for MSI-H metastatic colon cancer on 7/2/20    Interval history:   Patient was in the ER over the weekend with difficulty with urination and was started on antibiotics for suspected UTI after having a Muhammad placed.  Patient's Muhammad was removed prior to discharge from the ER.    He had blood work done for today's visit where he was found to have JOSE with a creatinine of 7, repeat blood work today morning revealed a creatinine trending uptake 11.4.  03/16/2023, patient denies any acute concerns.  He reports good oral intake and denies any difficulty with urination.  He denies any flank pain, blood in his urine, new medications since hospital discharge.  Follow-up clinic visit April 10, 2023.    The patient's  treatment with pembrolizumab, was  omitted  on March 16th, because of  JOSE, which was secondary to a bladder stone.   Once the stone was passed, and a Muhammad was placed, his kidney function totally recovered and a day ago the creatinine was 0.9, and the EGFR was over 60 mL/minute.  He came in with his daughter, who knows the patient is losing weight, his appetite is poor and he is subsisting on bananas and a ham sandwich once in awhile.  However he is 88 does not do much since he is fairly inmobile and I do not think these are symptoms to be too concerned.  Today we decided to wait until his treatment is due next which would be April 27th.  He still has a Muhammad catheter which will be there over the long-term.  Follow-up clinic visit June 8, 2023.    Mr. Mccoy returns to the clinic, with his wife, to continue on pembrolizumab.  He has been on pembrolizumab since after his surgery who which there was metastatic disease throughout the abdomen.  His cancer was MSI high and therefore an ideal patient for immunotherapy.  He had a CT of the chest abdomen and pelvis ordered by Ms. Cydney Tee last time, I will describe the results below.  He has no pain he is ambulating and of course he has a Muhammad catheter.  However sometime in August a plan to place a suprapubic catheter so he can use a leg bag        PMHx: HTN, BPH, colon cancer, DVT, VALENTIN due to GI blood loss  PSHx: partial colectomy, cholecystectomy  Social Hx: former smoker quit 2010, 60 pack year, no ETOH, no drugs, prior  but no toxic exposures  Family Hx: mother: lung cancer, maternal aunt: breast cancer  Meds: reviewed  Allergies: NKDA    Labs:  June 8, 2023 :  CMP dated 06/06/2023 is completely normal.  The TSH however is below 0.3 uIU/ml.  CBC shows hemoglobin of 15, white cell count 10,500 slightly elevated platelets 395, the differential is essentially normal.  April 10, 2023:  CMP dated 04/09/2023 is essentially normal except for a low albumin at 3.3 CBC is normal except for  abnormal indices , RBC and slight thrombocytosis  03/16/2023:  Creatinine 11.4, potassium  5.3, bicarb 20, sodium 131.  03/14/2023:  TSH 4.4, cortisol 13.6, CEA 2.2, creatinine 7.3, albumin 3.1, calcium 8.8, LFTs within normal limits, free T4 1.23, WBC count 10.2, hemoglobin 14.1, MCV 96.4, platelet count 3 1 7, ANC 7.82.  CEA 2.2      Imaging:  CT of the chest ,abdomen , and pelvis obtained on June 2, 2023, and compared  to  the November 08/2022, reveals no evidence of metastatic disease in the lung.  There are in the abdomen various nodes that are borderline in size.  They are also stable nodes in the retroperitoneum.  A ventral hernia is seen and, he has multiple stones and diverticuli in the bladder.    12/19/2022:  Ultrasound thyroid.  Thyroid tissue not clearly identified, although thyroid gland may be diffusely heterogenous, although with no gross focal thyroid nodule identified.  11/08/2022 CT chest abdomen pelvis with contrast:  No evidence of metastatic disease.  No significant change since prior study in the chest or abdomen.    04/26/2022 CT chest abdomen pelvis with contrast:  No evidence of metastatic disease or other significant change since previous CT dated 11/30/2021.      11/30/21 CT C/A/P: stable chest CT with no evidence of metastatic disease. No evidence of metastatic disease or other significant changes in abdomen/pelvis since prior CT    7/16/21 CT C/A/P w/ IV contrast: no evidence of metastatic disease in chest. No evidence of neoplastic disease within abd or pelvis. Fatty liver has worsened. Prostate enlargement. Multiple bladder calculi. Distal colonic diverticulosis. Atherosclerosis.    3/16/21 CT C/A/P: No significant changes and no evidence of metastatic disease in the chest. Decrease in the size of the small soft tissue density nodules in the left upper quadrant. No other significant changes.    11/30/20 CT C/A/P w/ IV contrast: no evidence of thoracic metastatic disease. Left upper quadrant abd wall thickening has decreased slightly. Small area of soft tissue density seen  in left upper quadrant adjacent to colon. Measures minimally smaller than on prior exam. Additional area of soft tissue density adjacent to stomach and pancreatic tail has not changed. Overall, several small soft tissue masses remain with left abdomen and left abdominal wall, few of those minimally decreased in size from prior scan.    9/18/20 CT C/A/P w/ IV contrast: no evidence of thoracic metastatic disease. Significant decrease in size of previously described masses within abd wall on the left. Residual mild localized soft tissue thickening of upper anterior abd wall to the left of midline adjacent to left lobe of liver at site of previously demonstrated mass. Residual oval shaped soft tissue mass more caudally on the left [id number] suspicious for residual tumor. Also residual mass in left upper quadrant between gastric body and tAil of pancreas that has also decreased in size and remains contiguous with gastric wall. Omental mass decreased to 3cm.    6/9/20 CT C/A/P w/ IV contrast: no significant cardiopulmonary abnormality. Subtle nodularity in right lobe of thyroid lobe. Liver unremarkable. Several solid lesions involving the abdominal wall. 3.9cm lesion in upper abd wall to left of midline. 4.9cm lesion at more caudal level. Several solid lesions are seen within omental fat of left upper quadrant. Portion of this appears to be attached to stomach.    4/9/20 RLE venous duplex: occlusive DVT right popliteal vein.    11/15/19 CT A/P w/ IV contrast: colonic mass at splenic flexure 7.6x4.3cm, contiguous with the stomach. Numerous diverticula in descending and sigmoid colon    Path:  11/26/19 left colectomy with partial gastrectomy: tumor site splenic flexure, greatest dimension 8.5cm, no perforation, adenocarcinoma, mod to poorly differentiated, tumor invades visceral peritoneum and directly invades gastric wall. Margins neg. +LVI, +PNI, no tumor deposits. 3/14 LNs+.  pT4N1b    11/15/19 colon 60cm biopsy: poorly  differentiated adenocarcinoma.  MLH1 loss of expression. MSH2 and MSH6 no loss of expression, PMS2 loss of expression. MSI-H both by MMR IHC and MSI PCR     Review of Systems:  CONSTITUTIONAL: no fevers, no chills, no weight loss, + chronic fatigue.   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  no tinnitus or hearing loss, no nose bleeding, no dysphagia, no odynophagia, no dental pain.   CVS: no chest pain, no palpitations, no dyspnea on exertion  RESP: no shortness of breath, no hemoptysis, no cough  GI: no nausea, no vomiting, no diarrhea, no constipation, no melena, no hematochezia, no hematemesis, no abdominal pain, no increase in abdominal girth  : no dysuria, no hematuria, no hesitancy, no scrotal swelling, no discharge  INTEGUMENT: no rashes, no abnormal bruising, no nail pitting, no hyperpigmentation  NEURO: no falls, no memory loss, no paresthesias or dysesthesias, no urofecal incontinence or retention, no loss of strength on any extremity  MSK: no back pain, no new joint pain, no joint swelling, no muscle weakness  PSYCH: no suicidal or homicidal ideation, no depression, no insomnia, no anhedonia  ENDOCRINE: no heat or cold intolerance, no polyuria, no polydipsia    Objective:     Physical Exam:     GA: AAOx3, very pleasant elderly gentleman.  Appears in no acute distress.    HEENT: NCAT, PERRL, EOMI, left eye ectropion, color blind Oropharynx moist, fair dentition. no oral ulcers.  LYMPH: no cervical, axillary or supraclavicular adenopathy  CVS: s1s2 RRR, no M/R/G  RESP: Lung sounds clear to auscultation. No wheezing or other adventitious sounds.   ABD: soft, NT, ND, BS+, no hepatosplenomegaly. Well-healed incision, left side. Hernia present just lateral of incision. Soft and non-tender, no pressure.   EXT: no deformities, no edema.   SKIN:  Warm, dry. No rashes  or bruising. No jaundice.   NEURO: normal mentation, strength 5/5 on all 4 extremities, no sensory deficits     Assessment:   1. 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  Keytruda started on 7/2/20. Changed to q6week on 12/23/20  CT C/A/P on 7/16/21 and 11/2021 essentially ERIC.  CT chest abdomen pelvis in April 2022 with ERIC  Dr. Bustillo, previously discussed with patient and daughter the options to continue close surveillance versus restart treatment with pembrolizumab given patient's long history of ERIC and almost 2 years of treatment with pembrolizumab.  Patient and daughter would like to continue pembrolizumab with plans to decrease dose or increase the dosing interval if he were to have recurrent side effects from treatment.  Will plan to obtain imaging q4-6 months or as clinically indicated.  Recent CT scan findings from June 2, 2023 looked very encouraging.       Plan:   #Labs reviewed and discussed with patient ok to proceed with Keytruda every 6 weeks   The sudden rising creatinine was secondary to obstructive uropathy, and has totally resolved. - seen by urology last week, per pt daughter they will probably keep his moulton in  # C/w Synthroid 300 mcg q.day. Follow up with endocrinologist(Dr. Shepherd) in 6 months. , TSH WNL today 4/27/23    # plan to follow-up in clinic in 6 weeks with MD ,CT CAP prior to appointment   CBC, CMP, MG, Phos TSH, CEA .  Given the very low levels of TSH, I have recommended a lower  dose of Synthroid.  The wife is going to call me to tell me whether it can be broken in half if I will lower the dose to 200 mcg.  Return to clinic, July 20th    Edu Mendez MD

## 2023-06-19 PROBLEM — N17.9 AKI (ACUTE KIDNEY INJURY): Status: RESOLVED | Noted: 2023-03-16 | Resolved: 2023-06-19

## 2023-07-20 ENCOUNTER — OFFICE VISIT (OUTPATIENT)
Dept: HEMATOLOGY/ONCOLOGY | Facility: CLINIC | Age: 88
End: 2023-07-20
Payer: MEDICARE

## 2023-07-20 VITALS
HEIGHT: 67 IN | BODY MASS INDEX: 41.22 KG/M2 | RESPIRATION RATE: 18 BRPM | OXYGEN SATURATION: 94 % | WEIGHT: 262.63 LBS | DIASTOLIC BLOOD PRESSURE: 81 MMHG | SYSTOLIC BLOOD PRESSURE: 132 MMHG | HEART RATE: 59 BPM | TEMPERATURE: 98 F

## 2023-07-20 DIAGNOSIS — E03.9 HYPOTHYROIDISM, UNSPECIFIED TYPE: Primary | ICD-10-CM

## 2023-07-20 DIAGNOSIS — T45.1X5A CHEMOTHERAPY ADVERSE REACTION, INITIAL ENCOUNTER: ICD-10-CM

## 2023-07-20 DIAGNOSIS — C18.9 COLON CANCER METASTASIZED TO MULTIPLE SITES: ICD-10-CM

## 2023-07-20 DIAGNOSIS — Z51.11 ENCOUNTER FOR ANTINEOPLASTIC CHEMOTHERAPY: ICD-10-CM

## 2023-07-20 PROCEDURE — 99215 OFFICE O/P EST HI 40 MIN: CPT | Mod: ,,, | Performed by: STUDENT IN AN ORGANIZED HEALTH CARE EDUCATION/TRAINING PROGRAM

## 2023-07-20 PROCEDURE — 99215 PR OFFICE/OUTPT VISIT, EST, LEVL V, 40-54 MIN: ICD-10-PCS | Mod: ,,, | Performed by: STUDENT IN AN ORGANIZED HEALTH CARE EDUCATION/TRAINING PROGRAM

## 2023-07-20 RX ORDER — SODIUM CHLORIDE 0.9 % (FLUSH) 0.9 %
10 SYRINGE (ML) INJECTION
Status: CANCELLED | OUTPATIENT
Start: 2023-07-20

## 2023-07-20 RX ORDER — HEPARIN 100 UNIT/ML
500 SYRINGE INTRAVENOUS
Status: CANCELLED | OUTPATIENT
Start: 2023-07-20

## 2023-07-20 RX ORDER — EPINEPHRINE 0.3 MG/.3ML
0.3 INJECTION SUBCUTANEOUS ONCE AS NEEDED
Status: CANCELLED | OUTPATIENT
Start: 2023-07-20

## 2023-07-20 RX ORDER — DIPHENHYDRAMINE HYDROCHLORIDE 50 MG/ML
50 INJECTION INTRAMUSCULAR; INTRAVENOUS ONCE AS NEEDED
Status: CANCELLED | OUTPATIENT
Start: 2023-07-20

## 2023-07-20 RX ORDER — LEVOTHYROXINE SODIUM 200 UG/1
200 TABLET ORAL DAILY
Qty: 30 TABLET | Refills: 11 | Status: SHIPPED | OUTPATIENT
Start: 2023-07-20 | End: 2023-10-12 | Stop reason: SDUPTHER

## 2023-08-08 ENCOUNTER — APPOINTMENT (OUTPATIENT)
Dept: LAB | Facility: HOSPITAL | Age: 88
End: 2023-08-08
Attending: UROLOGY
Payer: MEDICARE

## 2023-08-08 DIAGNOSIS — N39.0 URINARY TRACT INFECTION, SITE NOT SPECIFIED: Primary | ICD-10-CM

## 2023-08-08 LAB
BACTERIA #/AREA URNS HPF: ABNORMAL /HPF
BILIRUB UR QL STRIP: NEGATIVE
CAOX CRY URNS QL MICRO: ABNORMAL
CLARITY UR: ABNORMAL
COLOR UR: YELLOW
GLUCOSE UR QL STRIP: NEGATIVE
HGB UR QL STRIP: ABNORMAL
HYALINE CASTS #/AREA URNS LPF: 6.6 /LPF
KETONES UR QL STRIP: NEGATIVE
LEUKOCYTE ESTERASE UR QL STRIP: ABNORMAL
MICROSCOPIC COMMENT: ABNORMAL
NITRITE UR QL STRIP: NEGATIVE
PH UR STRIP: 6 [PH] (ref 5–8)
PROT UR QL STRIP: ABNORMAL
RBC #/AREA URNS HPF: >100 /HPF (ref 0–4)
SP GR UR STRIP: 1.02 (ref 1–1.03)
SQUAMOUS #/AREA URNS HPF: 1 /HPF
URN SPEC COLLECT METH UR: ABNORMAL
UROBILINOGEN UR STRIP-ACNC: 1 EU/DL
WBC #/AREA URNS HPF: >100 /HPF (ref 0–5)

## 2023-08-08 PROCEDURE — 87086 URINE CULTURE/COLONY COUNT: CPT | Performed by: UROLOGY

## 2023-08-08 PROCEDURE — 81000 URINALYSIS NONAUTO W/SCOPE: CPT | Performed by: UROLOGY

## 2023-08-10 LAB
BACTERIA UR CULT: NORMAL
BACTERIA UR CULT: NORMAL

## 2023-08-31 ENCOUNTER — OFFICE VISIT (OUTPATIENT)
Dept: HEMATOLOGY/ONCOLOGY | Facility: CLINIC | Age: 88
End: 2023-08-31
Payer: MEDICARE

## 2023-08-31 VITALS
WEIGHT: 265 LBS | OXYGEN SATURATION: 96 % | DIASTOLIC BLOOD PRESSURE: 83 MMHG | TEMPERATURE: 98 F | BODY MASS INDEX: 41.59 KG/M2 | SYSTOLIC BLOOD PRESSURE: 137 MMHG | HEART RATE: 63 BPM | HEIGHT: 67 IN | RESPIRATION RATE: 18 BRPM

## 2023-08-31 DIAGNOSIS — Z51.11 ENCOUNTER FOR ANTINEOPLASTIC CHEMOTHERAPY: ICD-10-CM

## 2023-08-31 DIAGNOSIS — C18.9 COLON CANCER METASTASIZED TO MULTIPLE SITES: Primary | ICD-10-CM

## 2023-08-31 PROCEDURE — 99215 PR OFFICE/OUTPT VISIT, EST, LEVL V, 40-54 MIN: ICD-10-PCS | Mod: ,,, | Performed by: NURSE PRACTITIONER

## 2023-08-31 PROCEDURE — 99215 OFFICE O/P EST HI 40 MIN: CPT | Mod: ,,, | Performed by: NURSE PRACTITIONER

## 2023-08-31 RX ORDER — EPINEPHRINE 0.3 MG/.3ML
0.3 INJECTION SUBCUTANEOUS ONCE AS NEEDED
Status: CANCELLED | OUTPATIENT
Start: 2023-08-31

## 2023-08-31 RX ORDER — HEPARIN 100 UNIT/ML
500 SYRINGE INTRAVENOUS
Status: CANCELLED | OUTPATIENT
Start: 2023-08-31

## 2023-08-31 RX ORDER — SODIUM CHLORIDE 0.9 % (FLUSH) 0.9 %
10 SYRINGE (ML) INJECTION
Status: CANCELLED | OUTPATIENT
Start: 2023-08-31

## 2023-08-31 RX ORDER — DIPHENHYDRAMINE HYDROCHLORIDE 50 MG/ML
50 INJECTION INTRAMUSCULAR; INTRAVENOUS ONCE AS NEEDED
Status: CANCELLED | OUTPATIENT
Start: 2023-08-31

## 2023-08-31 NOTE — PROGRESS NOTES
Subjective:      Patient ID: Stefano Mccoy is a 88 y.o. male.    Chief Complaint:  None.     History of Present Illness  Chief complaint: Metastatic colon cancer, MSI-H    Onc Hx:  7/2/20-7/9/20: injectafer x2  7/2/20-current: keytruda    HPI: 86 y/o M w/ PMHx of HTN, DVT postop after colectomy (was on Xarelto 11/2019-6/2020, Xarelto stopped due to GI bleed and anemia requiring frequent transfusions), VALENTIN due to GI blood loss, BPH first presented with hematochezia 10-11/2019. He had a colonoscopy with Dr Lima that showed a mass at 60cm, biopsy with poorly differentiated adenocarcinoma. On 11/26/19 he had a left colectomy with partial gastrectomy with Dr Liu, showing poorly differentiated adenocarcinoma invading gastric wall and visceral peritoneum and with 3/14 LNs+. Due to his advanced age, no chemotherapy was offered. 3/2020 he was noted to be anemic, given IV iron infusion and referred back to Dr Liu. He had imaging at [facility] showing widespread intra-abdominal metastatic disease. Referred to [facility] to establish care    He was given 2 doses of injectafer 7/2020  He was started on Keytruda for MSI-H metastatic colon cancer on 7/2/20    Interval history:     8/31/2023:  Mr. Mccoy is here today with his daughter for his follow-up and treatment with Pembrolizumab Q 6 weeks for his metastatic colon cancer.  Today, he states, he had a supra public catheter placed on 8/15/2023 for bladder stones.  He denies any fever, chills, cough, SOB, chest pain, rash, abnormal bleeding, abdominal pain, change in bowel habits, bone and joint pain, no lower extremity edema or neuropathy.  He reports tolerating treatment well.  Labs reviewed with patient and daughter.  CBC WNL.  CMP WNL with creatinine 1.07.  Cortisol 12.2, TSH 4.33, and CEA 2.6.  Reviewed CT chest with contrast dated 8/15/2023:  Impression:  Mild fatty prominence along the right lateral chest wall felt to represent the abnormality seen on recent chest x-ray.  No  evidence of any suspicious mass.  Weight is stable.  Eating and drinking well.  No recent hospitalizations, infections, or illnesses.    Labs:  2023:  CBC WNL.  CMP WNL with creatinine 1.07.  Cortisol 12.2, TSH 4.33, and CEA 2.6  2023 CMP unremarkable, TSH 11.28, CBC unremarkable.  CEA pending.  Phosphorus 2.7.  2023 :  CMP dated 2023 is completely normal.  The TSH however is below 0.3 uIU/ml.  CBC shows hemoglobin of 15, white cell count 10,500 slightly elevated platelets 395, the differential is essentially normal.  April 10, 2023:  CMP dated 2023 is essentially normal except for a low albumin at 3.3 CBC is normal except for  abnormal indices , RBC and slight thrombocytosis  2023:  Creatinine 11.4, potassium 5.3, bicarb 20, sodium 131.  2023:  TSH 4.4, cortisol 13.6, CEA 2.2, creatinine 7.3, albumin 3.1, calcium 8.8, LFTs within normal limits, free T4 1.23, WBC count 10.2, hemoglobin 14.1, MCV 96.4, platelet count 3 1 7, ANC 7.82.  CEA 2.2      Imagin/15/2023:  CT of the chest with contrast:  Impression:  Mild fatty prominence along the right lateral chest wall felt to represent the abnormality seen on recent chest x-ray.  No evidence of any suspicious mass.    CT of the chest ,abdomen , and pelvis obtained on 2023, and compared  to  the 2022, reveals no evidence of metastatic disease in the lung.  There are in the abdomen various nodes that are borderline in size.  They are also stable nodes in the retroperitoneum.  A ventral hernia is seen and, he has multiple stones and diverticuli in the bladder.    2022:  Ultrasound thyroid.  Thyroid tissue not clearly identified, although thyroid gland may be diffusely heterogenous, although with no gross focal thyroid nodule identified.  2022 CT chest abdomen pelvis with contrast:  No evidence of metastatic disease.  No significant change since prior study in the chest or  abdomen.    04/26/2022 CT chest abdomen pelvis with contrast:  No evidence of metastatic disease or other significant change since previous CT dated 11/30/2021.      11/30/21 CT C/A/P: stable chest CT with no evidence of metastatic disease. No evidence of metastatic disease or other significant changes in abdomen/pelvis since prior CT    7/16/21 CT C/A/P w/ IV contrast: no evidence of metastatic disease in chest. No evidence of neoplastic disease within abd or pelvis. Fatty liver has worsened. Prostate enlargement. Multiple bladder calculi. Distal colonic diverticulosis. Atherosclerosis.    3/16/21 CT C/A/P: No significant changes and no evidence of metastatic disease in the chest. Decrease in the size of the small soft tissue density nodules in the left upper quadrant. No other significant changes.    11/30/20 CT C/A/P w/ IV contrast: no evidence of thoracic metastatic disease. Left upper quadrant abd wall thickening has decreased slightly. Small area of soft tissue density seen in left upper quadrant adjacent to colon. Measures minimally smaller than on prior exam. Additional area of soft tissue density adjacent to stomach and pancreatic tail has not changed. Overall, several small soft tissue masses remain with left abdomen and left abdominal wall, few of those minimally decreased in size from prior scan.    9/18/20 CT C/A/P w/ IV contrast: no evidence of thoracic metastatic disease. Significant decrease in size of previously described masses within abd wall on the left. Residual mild localized soft tissue thickening of upper anterior abd wall to the left of midline adjacent to left lobe of liver at site of previously demonstrated mass. Residual oval shaped soft tissue mass more caudally on the left [id number] suspicious for residual tumor. Also residual mass in left upper quadrant between gastric body and tAil of pancreas that has also decreased in size and remains contiguous with gastric wall. Omental mass  decreased to 3cm.    6/9/20 CT C/A/P w/ IV contrast: no significant cardiopulmonary abnormality. Subtle nodularity in right lobe of thyroid lobe. Liver unremarkable. Several solid lesions involving the abdominal wall. 3.9cm lesion in upper abd wall to left of midline. 4.9cm lesion at more caudal level. Several solid lesions are seen within omental fat of left upper quadrant. Portion of this appears to be attached to stomach.    4/9/20 RLE venous duplex: occlusive DVT right popliteal vein.    11/15/19 CT A/P w/ IV contrast: colonic mass at splenic flexure 7.6x4.3cm, contiguous with the stomach. Numerous diverticula in descending and sigmoid colon    Path:  11/26/19 left colectomy with partial gastrectomy: tumor site splenic flexure, greatest dimension 8.5cm, no perforation, adenocarcinoma, mod to poorly differentiated, tumor invades visceral peritoneum and directly invades gastric wall. Margins neg. +LVI, +PNI, no tumor deposits. 3/14 LNs+.  [id number]    11/15/19 colon 60cm biopsy: poorly differentiated adenocarcinoma.  MLH1 loss of expression. MSH2 and MSH6 no loss of expression, PMS2 loss of expression. MSI-H both by MMR IHC and MSI PCR       Past Medical History:   Diagnosis Date    BPH (benign prostatic hyperplasia)     Cancer     Colon cancer     DVT (deep venous thrombosis)     Hypertension        Past Surgical History:   Procedure Laterality Date    ADENOIDECTOMY      BLADDER STONE REMOVAL  08/17/2023    CHOLECYSTECTOMY      COLECTOMY      EYE SURGERY      TONSILLECTOMY         Family History   Problem Relation Age of Onset    Lung cancer Mother        Social History     Socioeconomic History    Marital status:    Tobacco Use    Smoking status: Former    Smokeless tobacco: Never   Substance and Sexual Activity    Alcohol use: Not Currently    Drug use: Never    Sexual activity: Not Currently       Current Outpatient Medications   Medication Sig Dispense Refill    levothyroxine (SYNTHROID) 200 MCG tablet  Take 1 tablet (200 mcg total) by mouth once daily. 30 tablet 11    finasteride (PROSCAR) 5 mg tablet Take 5 mg by mouth once daily.      tamsulosin (FLOMAX) 0.4 mg Cap Take 0.4 mg by mouth once daily.       No current facility-administered medications for this visit.       Review of patient's allergies indicates:  No Known Allergies      Review of Systems:  CONSTITUTIONAL: no fevers, no chills, no weight loss, + chronic fatigue.   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  no tinnitus or hearing loss, no nose bleeding, no dysphagia, no odynophagia, no dental pain.   CVS: no chest pain, no palpitations, no dyspnea on exertion  RESP: no shortness of breath, no hemoptysis, no cough  GI: no nausea, no vomiting, no diarrhea, no constipation, no melena, no hematochezia, no hematemesis, no abdominal pain, no increase in abdominal girth  : no dysuria, no hematuria, no hesitancy, no scrotal swelling, no discharge  INTEGUMENT: no rashes, no abnormal bruising, no nail pitting, no hyperpigmentation  NEURO: no falls, no memory loss, no paresthesias or dysesthesias, no urofecal incontinence or retention, no loss of strength on any extremity  MSK: no back pain, no new joint pain, no joint swelling, no muscle weakness  PSYCH: no suicidal or homicidal ideation, no depression, no insomnia, no anhedonia  ENDOCRINE: no heat or cold intolerance, no polyuria, no polydipsia    Objective:     Physical Exam:     GA: AAOx3, very pleasant elderly gentleman.  Appears in no acute distress.    HEENT: NCAT, PERRL, EOMI, left eye ectropion, color blind Oropharynx moist, fair dentition. no oral ulcers.  LYMPH: no cervical, axillary or supraclavicular adenopathy  CVS: s1s2 RRR, no M/R/G  RESP: Lung sounds clear to auscultation. No wheezing or other adventitious sounds.   ABD: soft, NT, ND, BS+, no  hepatosplenomegaly. Well-healed incision, left side. Hernia present just lateral of incision. Soft and non-tender, no pressure.   EXT: no deformities, no edema.   SKIN:  Warm, dry. No rashes or bruising. No jaundice.   NEURO: normal mentation, strength 5/5 on all 4 extremities, no sensory deficits     Assessment:   1. 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  Keytruda started on 7/2/20. Changed to q6week on 12/23/20  CT C/A/P on 7/16/21 and 11/2021 essentially ERIC.  CT chest abdomen pelvis in April 2022 with ERIC  Previously discussed with patient and daughter the options to continue close surveillance versus restart treatment with pembrolizumab given patient's long history of ERIC and almost 2 years of treatment with pembrolizumab.  Patient and daughter would like to continue pembrolizumab with plans to decrease dose or increase the dosing interval if he were to have recurrent side effects from treatment.  Will plan to obtain imaging q4-6 months or as clinically indicated.  Recent CT scan findings from June 2, 2023 looked very encouraging.    8/31/2023 Assessment:  Mr. Mccoy is doing well.  Subjective and objective assessment negative.  Has a new supra pubic catheter for bladder stones, draining clear yellow urine. Tolerating treatment well.       Plan:   8/31/2023:  Labs reviewed and discussed with patient ok to proceed with Keytruda every 6 weeks   Follow-up in 6 weeks with repeat labs including free T4, TSH, CEA,  and treatment.      The patient is in agreement with today's plan of care.  All questions answered.  Patient is aware to contact our office for any problems or concerns prior to next visit.      Naila Mauricio, MSN-ED, APRN, AGACNP-BC, OCN

## 2023-10-12 ENCOUNTER — OFFICE VISIT (OUTPATIENT)
Dept: HEMATOLOGY/ONCOLOGY | Facility: CLINIC | Age: 88
End: 2023-10-12
Payer: MEDICARE

## 2023-10-12 VITALS
WEIGHT: 260 LBS | OXYGEN SATURATION: 95 % | HEIGHT: 67 IN | HEART RATE: 75 BPM | DIASTOLIC BLOOD PRESSURE: 81 MMHG | RESPIRATION RATE: 18 BRPM | SYSTOLIC BLOOD PRESSURE: 116 MMHG | BODY MASS INDEX: 40.81 KG/M2 | TEMPERATURE: 98 F

## 2023-10-12 DIAGNOSIS — T45.1X5A CHEMOTHERAPY ADVERSE REACTION, INITIAL ENCOUNTER: ICD-10-CM

## 2023-10-12 DIAGNOSIS — Z51.11 ENCOUNTER FOR ANTINEOPLASTIC CHEMOTHERAPY: ICD-10-CM

## 2023-10-12 DIAGNOSIS — C18.9 COLON CANCER METASTASIZED TO MULTIPLE SITES: Primary | ICD-10-CM

## 2023-10-12 DIAGNOSIS — E03.9 HYPOTHYROIDISM, UNSPECIFIED TYPE: ICD-10-CM

## 2023-10-12 PROCEDURE — 99215 OFFICE O/P EST HI 40 MIN: CPT | Mod: ,,, | Performed by: NURSE PRACTITIONER

## 2023-10-12 PROCEDURE — 99215 PR OFFICE/OUTPT VISIT, EST, LEVL V, 40-54 MIN: ICD-10-PCS | Mod: ,,, | Performed by: NURSE PRACTITIONER

## 2023-10-12 RX ORDER — LEVOTHYROXINE SODIUM 200 UG/1
200 TABLET ORAL DAILY
Qty: 30 TABLET | Refills: 4 | Status: SHIPPED | OUTPATIENT
Start: 2023-10-12 | End: 2023-11-30 | Stop reason: SDUPTHER

## 2023-10-12 RX ORDER — HEPARIN 100 UNIT/ML
500 SYRINGE INTRAVENOUS
Status: CANCELLED | OUTPATIENT
Start: 2023-10-12

## 2023-10-12 RX ORDER — EPINEPHRINE 0.3 MG/.3ML
0.3 INJECTION SUBCUTANEOUS ONCE AS NEEDED
Status: CANCELLED | OUTPATIENT
Start: 2023-10-12

## 2023-10-12 RX ORDER — CEPHALEXIN 250 MG/1
250 CAPSULE ORAL NIGHTLY
COMMUNITY
Start: 2023-10-03

## 2023-10-12 RX ORDER — SODIUM CHLORIDE 0.9 % (FLUSH) 0.9 %
10 SYRINGE (ML) INJECTION
Status: CANCELLED | OUTPATIENT
Start: 2023-10-12

## 2023-10-12 RX ORDER — DIPHENHYDRAMINE HYDROCHLORIDE 50 MG/ML
50 INJECTION INTRAMUSCULAR; INTRAVENOUS ONCE AS NEEDED
Status: CANCELLED | OUTPATIENT
Start: 2023-10-12

## 2023-10-12 NOTE — PROGRESS NOTES
Subjective:      Patient ID: Stefano Mccoy is a 89 y.o. male.    Chief Complaint:  Here for my treatment.    History of Present Illness  Chief complaint: Metastatic colon cancer, MSI-H    Onc Hx:  7/2/20-7/9/20: injectafer x2  7/2/20-current: keytruda    HPI: 86 y/o M w/ PMHx of HTN, DVT postop after colectomy (was on Xarelto 11/2019-6/2020, Xarelto stopped due to GI bleed and anemia requiring frequent transfusions), VALENTIN due to GI blood loss, BPH first presented with hematochezia 10-11/2019. He had a colonoscopy with Dr Lima that showed a mass at 60cm, biopsy with poorly differentiated adenocarcinoma. On 11/26/19 he had a left colectomy with partial gastrectomy with Dr Liu, showing poorly differentiated adenocarcinoma invading gastric wall and visceral peritoneum and with 3/14 LNs+. Due to his advanced age, no chemotherapy was offered. 3/2020 he was noted to be anemic, given IV iron infusion and referred back to Dr Liu. He had imaging at [facility] showing widespread intra-abdominal metastatic disease. Referred to [facility] to establish care    He was given 2 doses of injectafer 7/2020  He was started on Keytruda for MSI-H metastatic colon cancer on 7/2/20    Interval history:     10/12/2023:  Mr. Mccoy is here today with his daughter for his follow-up and treatment with Pembrolizumab Q 6 weeks for his metastatic colon cancer.  Today, he states, his supra public catheter was removed and replaced with a Muhammad Catheter.  He states, he is on low dose Keflex until the supra pubic catheter is reinserted for a history of bladder stones.  He sees Dr. Arora, urologist.  He denies any fever, chills, cough, SOB, chest pain, rash, abnormal bleeding, abdominal pain, change in bowel habits, bone and joint pain, no lower extremity edema or neuropathy.  He reports tolerating treatment well.  Labs reviewed with patient and daughter dated 10/11/2023:  Creatinine 1.11.  Cortisol 8.8 TSH 6.71 and CEA 2.1.  WBC 8.35 hgb 15.6, Hct  47.9, Plt 380,000.  Weight is stable.  Eating and drinking well.  No recent hospitalizations, infections, or illnesses.  Daughter is requesting to move his next treatment out to 7 weeks, as she will be out-of-town, and the patient does not drive.    Labs:    10/11/2023:  Creatinine 1.11.  Cortisol 8.8 TSH 6.71 and CEA 2.1.  WBC 8.35 hgb 15.6, Hct 47.9, Plt 380,000  2023:  CBC WNL.  CMP WNL with creatinine 1.07.  Cortisol 12.2, TSH 4.33, and CEA 2.6  2023 CMP unremarkable, TSH 11.28, CBC unremarkable.  CEA pending.  Phosphorus 2.7.  2023 :  CMP dated 2023 is completely normal.  The TSH however is below 0.3 uIU/ml.  CBC shows hemoglobin of 15, white cell count 10,500 slightly elevated platelets 395, the differential is essentially normal.  April 10, 2023:  CMP dated 2023 is essentially normal except for a low albumin at 3.3 CBC is normal except for  abnormal indices , RBC and slight thrombocytosis  2023:  Creatinine 11.4, potassium 5.3, bicarb 20, sodium 131.  2023:  TSH 4.4, cortisol 13.6, CEA 2.2, creatinine 7.3, albumin 3.1, calcium 8.8, LFTs within normal limits, free T4 1.23, WBC count 10.2, hemoglobin 14.1, MCV 96.4, platelet count 3 1 7, ANC 7.82.  CEA 2.2      Imagin/15/2023:  CT of the chest with contrast:  Impression:  Mild fatty prominence along the right lateral chest wall felt to represent the abnormality seen on recent chest x-ray.  No evidence of any suspicious mass.    CT of the chest ,abdomen , and pelvis obtained on 2023, and compared  to  the 2022, reveals no evidence of metastatic disease in the lung.  There are in the abdomen various nodes that are borderline in size.  They are also stable nodes in the retroperitoneum.  A ventral hernia is seen and, he has multiple stones and diverticuli in the bladder.    2022:  Ultrasound thyroid.  Thyroid tissue not clearly identified, although thyroid gland may be diffusely  heterogenous, although with no gross focal thyroid nodule identified.  11/08/2022 CT chest abdomen pelvis with contrast:  No evidence of metastatic disease.  No significant change since prior study in the chest or abdomen.    04/26/2022 CT chest abdomen pelvis with contrast:  No evidence of metastatic disease or other significant change since previous CT dated 11/30/2021.      11/30/21 CT C/A/P: stable chest CT with no evidence of metastatic disease. No evidence of metastatic disease or other significant changes in abdomen/pelvis since prior CT    7/16/21 CT C/A/P w/ IV contrast: no evidence of metastatic disease in chest. No evidence of neoplastic disease within abd or pelvis. Fatty liver has worsened. Prostate enlargement. Multiple bladder calculi. Distal colonic diverticulosis. Atherosclerosis.    3/16/21 CT C/A/P: No significant changes and no evidence of metastatic disease in the chest. Decrease in the size of the small soft tissue density nodules in the left upper quadrant. No other significant changes.    11/30/20 CT C/A/P w/ IV contrast: no evidence of thoracic metastatic disease. Left upper quadrant abd wall thickening has decreased slightly. Small area of soft tissue density seen in left upper quadrant adjacent to colon. Measures minimally smaller than on prior exam. Additional area of soft tissue density adjacent to stomach and pancreatic tail has not changed. Overall, several small soft tissue masses remain with left abdomen and left abdominal wall, few of those minimally decreased in size from prior scan.    9/18/20 CT C/A/P w/ IV contrast: no evidence of thoracic metastatic disease. Significant decrease in size of previously described masses within abd wall on the left. Residual mild localized soft tissue thickening of upper anterior abd wall to the left of midline adjacent to left lobe of liver at site of previously demonstrated mass. Residual oval shaped soft tissue mass more caudally on the left  [id number] suspicious for residual tumor. Also residual mass in left upper quadrant between gastric body and tAil of pancreas that has also decreased in size and remains contiguous with gastric wall. Omental mass decreased to 3cm.    6/9/20 CT C/A/P w/ IV contrast: no significant cardiopulmonary abnormality. Subtle nodularity in right lobe of thyroid lobe. Liver unremarkable. Several solid lesions involving the abdominal wall. 3.9cm lesion in upper abd wall to left of midline. 4.9cm lesion at more caudal level. Several solid lesions are seen within omental fat of left upper quadrant. Portion of this appears to be attached to stomach.    4/9/20 RLE venous duplex: occlusive DVT right popliteal vein.    11/15/19 CT A/P w/ IV contrast: colonic mass at splenic flexure 7.6x4.3cm, contiguous with the stomach. Numerous diverticula in descending and sigmoid colon    Path:  11/26/19 left colectomy with partial gastrectomy: tumor site splenic flexure, greatest dimension 8.5cm, no perforation, adenocarcinoma, mod to poorly differentiated, tumor invades visceral peritoneum and directly invades gastric wall. Margins neg. +LVI, +PNI, no tumor deposits. 3/14 LNs+.  pT4N1b    11/15/19 colon 60cm biopsy: poorly differentiated adenocarcinoma.  MLH1 loss of expression. MSH2 and MSH6 no loss of expression, PMS2 loss of expression. MSI-H both by MMR IHC and MSI PCR       Past Medical History:   Diagnosis Date    BPH (benign prostatic hyperplasia)     Cancer     Colon cancer     DVT (deep venous thrombosis)     Hypertension        Past Surgical History:   Procedure Laterality Date    ADENOIDECTOMY      BLADDER STONE REMOVAL  08/17/2023    CHOLECYSTECTOMY      COLECTOMY      EYE SURGERY      TONSILLECTOMY         Family History   Problem Relation Age of Onset    Lung cancer Mother        Social History     Socioeconomic History    Marital status:    Tobacco Use    Smoking status: Former    Smokeless tobacco: Never   Substance and  Sexual Activity    Alcohol use: Not Currently    Drug use: Never    Sexual activity: Not Currently       Current Outpatient Medications   Medication Sig Dispense Refill    cephALEXin (KEFLEX) 250 MG capsule Take 250 mg by mouth every evening.      finasteride (PROSCAR) 5 mg tablet Take 5 mg by mouth once daily.      levothyroxine (SYNTHROID) 200 MCG tablet Take 1 tablet (200 mcg total) by mouth once daily. 30 tablet 4    tamsulosin (FLOMAX) 0.4 mg Cap Take 0.4 mg by mouth once daily.       No current facility-administered medications for this visit.       Review of patient's allergies indicates:  No Known Allergies      Review of Systems:  CONSTITUTIONAL: no fevers, no chills, no weight loss, + chronic fatigue.   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  no tinnitus or hearing loss, no nose bleeding, no dysphagia, no odynophagia, no dental pain.   CVS: no chest pain, no palpitations, no dyspnea on exertion  RESP: no shortness of breath, no hemoptysis, no cough  GI: no nausea, no vomiting, no diarrhea, no constipation, no melena, no hematochezia, no hematemesis, no abdominal pain, no increase in abdominal girth  : Muhammad Catheter, no dysuria, no hematuria, no hesitancy, no scrotal swelling, no discharge  INTEGUMENT: no rashes, no abnormal bruising, no nail pitting, no hyperpigmentation  NEURO: no falls, no memory loss, no paresthesias or dysesthesias, no urofecal incontinence or retention, no loss of strength on any extremity  MSK: no back pain, no new joint pain, no joint swelling, no muscle weakness  PSYCH: no suicidal or homicidal ideation, no depression, no insomnia, no anhedonia  ENDOCRINE: no heat or cold intolerance, no polyuria, no polydipsia    Objective:     Physical Exam:     GA: AAOx3, very pleasant elderly gentleman.  Appears in no acute distress.    HEENT:  NCAT, PERRL, EOMI, left eye ectropion, color blind Oropharynx moist, fair dentition. no oral ulcers.  LYMPH: no cervical, axillary or supraclavicular adenopathy  CVS: s1s2 RRR, no M/R/G  RESP: Lung sounds clear to auscultation. No wheezing or other adventitious sounds.   ABD: soft, NT, ND, BS+, no hepatosplenomegaly. Well-healed incision, left side. Hernia present just lateral of incision. Soft and non-tender, no pressure.   EXT: no deformities, no edema.   SKIN:  Warm, dry. No rashes or bruising. No jaundice.   NEURO: normal mentation, strength 5/5 on all 4 extremities, no sensory deficits   Muhammad catheter in place draining clear yellow urine.  Assessment:   1. 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  Keytruda started on 7/2/20. Changed to q6week on 12/23/20  CT C/A/P on 7/16/21 and 11/2021 essentially ERIC.  CT chest abdomen pelvis in April 2022 with ERIC  Previously discussed with patient and daughter the options to continue close surveillance versus restart treatment with pembrolizumab given patient's long history of ERIC and almost 2 years of treatment with pembrolizumab.  Patient and daughter would like to continue pembrolizumab with plans to decrease dose or increase the dosing interval if he were to have recurrent side effects from treatment.  Will plan to obtain imaging q4-6 months or as clinically indicated.  Recent CT scan findings from June 2, 2023 looked very encouraging.    8/31/2023 Assessment:    Darius is doing well.  Subjective and objective assessment negative.  Has a Muhammad catheter for bladder stones, draining clear yellow urine. Tolerating treatment well.  Will have supra pubic cath reinserted in December for bladder stones.  On low dose Keflex until that time.  TSH is elevated.         Plan:   8/31/2023:    Labs reviewed and discussed with patient ok to proceed with Keytruda today.  Continue with Keflex per Dr. Arora to prevent UTI from Muhammad catheter.  Increase dose of Levothyroxine from 150 mcg PO QD to 200 mcg PO QD.  Patient to follow-up in 7 weeks due to Thanksgiving holiday repeat labs including free T4, TSH, CEA, and treatment.      The patient is in agreement with today's plan of care.  All questions answered.  Patient is aware to contact our office for any problems or concerns prior to next visit.      Naila Mauricio, MSN-ED, APRN, AGACNP-BC, OCN

## 2023-11-30 ENCOUNTER — OFFICE VISIT (OUTPATIENT)
Dept: HEMATOLOGY/ONCOLOGY | Facility: CLINIC | Age: 88
End: 2023-11-30
Payer: MEDICARE

## 2023-11-30 VITALS
WEIGHT: 269 LBS | HEIGHT: 67 IN | DIASTOLIC BLOOD PRESSURE: 83 MMHG | SYSTOLIC BLOOD PRESSURE: 146 MMHG | BODY MASS INDEX: 42.22 KG/M2 | TEMPERATURE: 98 F | HEART RATE: 78 BPM | OXYGEN SATURATION: 95 %

## 2023-11-30 DIAGNOSIS — E03.9 HYPOTHYROIDISM, UNSPECIFIED TYPE: Primary | ICD-10-CM

## 2023-11-30 DIAGNOSIS — T45.1X5A CHEMOTHERAPY ADVERSE REACTION, INITIAL ENCOUNTER: ICD-10-CM

## 2023-11-30 DIAGNOSIS — C18.9 COLON CANCER METASTASIZED TO MULTIPLE SITES: ICD-10-CM

## 2023-11-30 DIAGNOSIS — Z29.89 ENCOUNTER FOR IMMUNOTHERAPY: ICD-10-CM

## 2023-11-30 PROCEDURE — 99215 PR OFFICE/OUTPT VISIT, EST, LEVL V, 40-54 MIN: ICD-10-PCS | Mod: ,,, | Performed by: NURSE PRACTITIONER

## 2023-11-30 PROCEDURE — 99215 OFFICE O/P EST HI 40 MIN: CPT | Mod: ,,, | Performed by: NURSE PRACTITIONER

## 2023-11-30 RX ORDER — HEPARIN 100 UNIT/ML
500 SYRINGE INTRAVENOUS
Status: CANCELLED | OUTPATIENT
Start: 2023-11-30

## 2023-11-30 RX ORDER — LEVOTHYROXINE SODIUM 200 UG/1
200 TABLET ORAL DAILY
Qty: 30 TABLET | Refills: 4 | Status: SHIPPED | OUTPATIENT
Start: 2023-11-30 | End: 2024-11-29

## 2023-11-30 RX ORDER — DIPHENHYDRAMINE HYDROCHLORIDE 50 MG/ML
50 INJECTION INTRAMUSCULAR; INTRAVENOUS ONCE AS NEEDED
Status: CANCELLED | OUTPATIENT
Start: 2023-11-30

## 2023-11-30 RX ORDER — EPINEPHRINE 0.3 MG/.3ML
0.3 INJECTION SUBCUTANEOUS ONCE AS NEEDED
Status: CANCELLED | OUTPATIENT
Start: 2023-11-30

## 2023-11-30 RX ORDER — LEVOTHYROXINE SODIUM 25 UG/1
25 TABLET ORAL
Qty: 30 TABLET | Refills: 11 | Status: SHIPPED | OUTPATIENT
Start: 2023-11-30 | End: 2024-11-29

## 2023-11-30 RX ORDER — SODIUM CHLORIDE 0.9 % (FLUSH) 0.9 %
10 SYRINGE (ML) INJECTION
Status: CANCELLED | OUTPATIENT
Start: 2023-11-30

## 2023-11-30 NOTE — PROGRESS NOTES
Subjective:      Patient ID: Stefano Mccoy is a 89 y.o. male.    Chief Complaint:  Here for my treatment.    History of Present Illness  Chief complaint: Metastatic colon cancer, MSI-H    Onc Hx:  7/2/20-7/9/20: injectafer x2  7/2/20-current: keytruda    HPI: 88 y/o M w/ PMHx of HTN, DVT postop after colectomy (was on Xarelto 11/2019-6/2020, Xarelto stopped due to GI bleed and anemia requiring frequent transfusions), VALENTIN due to GI blood loss, BPH first presented with hematochezia 10-11/2019. He had a colonoscopy with Dr Lima that showed a mass at 60cm, biopsy with poorly differentiated adenocarcinoma. On 11/26/19 he had a left colectomy with partial gastrectomy with Dr Liu, showing poorly differentiated adenocarcinoma invading gastric wall and visceral peritoneum and with 3/14 LNs+. Due to his advanced age, no chemotherapy was offered. 3/2020 he was noted to be anemic, given IV iron infusion and referred back to Dr Liu. He had imaging at [facility] showing widespread intra-abdominal metastatic disease. Referred to [facility] to establish care    He was given 2 doses of injectafer 7/2020  He was started on Keytruda for MSI-H metastatic colon cancer on 7/2/20    Interval history:     11/30/2023:  Mr. Mccoy is here today with his daughter for his follow-up and treatment with Pembrolizumab Q 6 weeks for his metastatic colon cancer.  Today, he states, he states, he will have his supra public catheter placed on December 7, 2023.  He now has a Muhammad Catheter, which is draining clear yellow urine.  He states, he is on low dose Keflex until the supra pubic catheter is reinserted for a history of bladder stones.  He sees Dr. Arora, urologist.  He denies any fever, chills, cough, SOB, chest pain, rash, abnormal bleeding, abdominal pain, change in bowel habits, bone and joint pain, no lower extremity edema or neuropathy.  He reports tolerating treatment well.  Labs reviewed with patient and daughter dated 11/29/2023:   Creatinine 1.13, Cortisol 12.9, TSH 26.36, and CEA 3.0, WBC 8.53 hgb 15.7, Hct 47.9, Plt 333,000.  Weight is stable.  Eating and drinking well.  No recent hospitalizations, infections, or illnesses.   We discussed increasing his Levothyroxine to 225 mcg PO daily before breakfast due to elevated TSH of 26.36.        Labs:  2023:  Creatinine 1.13, Cortisol 12.9, TSH 26.36, and CEA 3.0, WBC 8.53 hgb 15.7, Hct 47.9, Plt 333,000.  10/11/2023:  Creatinine 1.11.  Cortisol 8.8 TSH 6.71 and CEA 2.1.  WBC 8.35 hgb 15.6, Hct 47.9, Plt 380,000  2023:  CBC WNL.  CMP WNL with creatinine 1.07.  Cortisol 12.2, TSH 4.33, and CEA 2.6  2023 CMP unremarkable, TSH 11.28, CBC unremarkable.  CEA pending.  Phosphorus 2.7.  2023 :  CMP dated 2023 is completely normal.  The TSH however is below 0.3 uIU/ml.  CBC shows hemoglobin of 15, white cell count 10,500 slightly elevated platelets 395, the differential is essentially normal.  April 10, 2023:  CMP dated 2023 is essentially normal except for a low albumin at 3.3 CBC is normal except for  abnormal indices , RBC and slight thrombocytosis  2023:  Creatinine 11.4, potassium 5.3, bicarb 20, sodium 131.  2023:  TSH 4.4, cortisol 13.6, CEA 2.2, creatinine 7.3, albumin 3.1, calcium 8.8, LFTs within normal limits, free T4 1.23, WBC count 10.2, hemoglobin 14.1, MCV 96.4, platelet count 3 1 7, ANC 7.82.  CEA 2.2      Imagin/15/2023:  CT of the chest with contrast:  Impression:  Mild fatty prominence along the right lateral chest wall felt to represent the abnormality seen on recent chest x-ray.  No evidence of any suspicious mass.    CT of the chest ,abdomen , and pelvis obtained on 2023, and compared  to  the 2022, reveals no evidence of metastatic disease in the lung.  There are in the abdomen various nodes that are borderline in size.  They are also stable nodes in the retroperitoneum.  A ventral hernia is seen and, he  has multiple stones and diverticuli in the bladder.    12/19/2022:  Ultrasound thyroid.  Thyroid tissue not clearly identified, although thyroid gland may be diffusely heterogenous, although with no gross focal thyroid nodule identified.  11/08/2022 CT chest abdomen pelvis with contrast:  No evidence of metastatic disease.  No significant change since prior study in the chest or abdomen.    04/26/2022 CT chest abdomen pelvis with contrast:  No evidence of metastatic disease or other significant change since previous CT dated 11/30/2021.      11/30/21 CT C/A/P: stable chest CT with no evidence of metastatic disease. No evidence of metastatic disease or other significant changes in abdomen/pelvis since prior CT    7/16/21 CT C/A/P w/ IV contrast: no evidence of metastatic disease in chest. No evidence of neoplastic disease within abd or pelvis. Fatty liver has worsened. Prostate enlargement. Multiple bladder calculi. Distal colonic diverticulosis. Atherosclerosis.    3/16/21 CT C/A/P: No significant changes and no evidence of metastatic disease in the chest. Decrease in the size of the small soft tissue density nodules in the left upper quadrant. No other significant changes.    11/30/20 CT C/A/P w/ IV contrast: no evidence of thoracic metastatic disease. Left upper quadrant abd wall thickening has decreased slightly. Small area of soft tissue density seen in left upper quadrant adjacent to colon. Measures minimally smaller than on prior exam. Additional area of soft tissue density adjacent to stomach and pancreatic tail has not changed. Overall, several small soft tissue masses remain with left abdomen and left abdominal wall, few of those minimally decreased in size from prior scan.    9/18/20 CT C/A/P w/ IV contrast: no evidence of thoracic metastatic disease. Significant decrease in size of previously described masses within abd wall on the left. Residual mild localized soft tissue thickening of upper anterior abd  wall to the left of midline adjacent to left lobe of liver at site of previously demonstrated mass. Residual oval shaped soft tissue mass more caudally on the left [id number] suspicious for residual tumor. Also residual mass in left upper quadrant between gastric body and tAil of pancreas that has also decreased in size and remains contiguous with gastric wall. Omental mass decreased to 3cm.    6/9/20 CT C/A/P w/ IV contrast: no significant cardiopulmonary abnormality. Subtle nodularity in right lobe of thyroid lobe. Liver unremarkable. Several solid lesions involving the abdominal wall. 3.9cm lesion in upper abd wall to left of midline. 4.9cm lesion at more caudal level. Several solid lesions are seen within omental fat of left upper quadrant. Portion of this appears to be attached to stomach.    4/9/20 RLE venous duplex: occlusive DVT right popliteal vein.    11/15/19 CT A/P w/ IV contrast: colonic mass at splenic flexure 7.6x4.3cm, contiguous with the stomach. Numerous diverticula in descending and sigmoid colon    Path:  11/26/19 left colectomy with partial gastrectomy: tumor site splenic flexure, greatest dimension 8.5cm, no perforation, adenocarcinoma, mod to poorly differentiated, tumor invades visceral peritoneum and directly invades gastric wall. Margins neg. +LVI, +PNI, no tumor deposits. 3/14 LNs+.  [id number]    11/15/19 colon 60cm biopsy: poorly differentiated adenocarcinoma.  MLH1 loss of expression. MSH2 and MSH6 no loss of expression, PMS2 loss of expression. MSI-H both by MMR IHC and MSI PCR       Past Medical History:   Diagnosis Date    BPH (benign prostatic hyperplasia)     Cancer     Colon cancer     DVT (deep venous thrombosis)     Hypertension        Past Surgical History:   Procedure Laterality Date    ADENOIDECTOMY      BLADDER STONE REMOVAL  08/17/2023    CHOLECYSTECTOMY      COLECTOMY      EYE SURGERY      TONSILLECTOMY         Family History   Problem Relation Age of Onset    Lung cancer Mother         Social History     Socioeconomic History    Marital status:    Tobacco Use    Smoking status: Former    Smokeless tobacco: Never   Substance and Sexual Activity    Alcohol use: Not Currently    Drug use: Never    Sexual activity: Not Currently       Current Outpatient Medications   Medication Sig Dispense Refill    cephALEXin (KEFLEX) 250 MG capsule Take 250 mg by mouth every evening.      finasteride (PROSCAR) 5 mg tablet Take 5 mg by mouth once daily.      levothyroxine (SYNTHROID) 200 MCG tablet Take 1 tablet (200 mcg total) by mouth once daily. 30 tablet 4    levothyroxine (SYNTHROID) 25 MCG tablet Take 1 tablet (25 mcg total) by mouth before breakfast. 30 tablet 11    tamsulosin (FLOMAX) 0.4 mg Cap Take 0.4 mg by mouth once daily.       No current facility-administered medications for this visit.       Review of patient's allergies indicates:  No Known Allergies      Review of Systems:  CONSTITUTIONAL: no fevers, no chills, no weight loss, + chronic fatigue.   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  no tinnitus or hearing loss, no nose bleeding, no dysphagia, no odynophagia, no dental pain.   CVS: no chest pain, no palpitations, no dyspnea on exertion  RESP: no shortness of breath, no hemoptysis, no cough  GI: no nausea, no vomiting, no diarrhea, no constipation, no melena, no hematochezia, no hematemesis, no abdominal pain, no increase in abdominal girth  : Muhammad Catheter, no dysuria, no hematuria, no hesitancy, no scrotal swelling, no discharge  INTEGUMENT: no rashes, no abnormal bruising, no nail pitting, no hyperpigmentation  NEURO: no falls, no memory loss, no paresthesias or dysesthesias, no urofecal incontinence or retention, no loss of strength on any extremity  MSK: no back pain, no new joint pain, no joint swelling, no muscle  weakness  PSYCH: no suicidal or homicidal ideation, no depression, no insomnia, no anhedonia  ENDOCRINE: no heat or cold intolerance, no polyuria, no polydipsia    Objective:     Physical Exam:     GA: AAOx3, very pleasant elderly gentleman.  Appears in no acute distress.    HEENT: NCAT, PERRL, EOMI, left eye ectropion, color blind Oropharynx moist, fair dentition. no oral ulcers.  LYMPH: no cervical, axillary or supraclavicular adenopathy  CVS: s1s2 RRR, no M/R/G  RESP: Lung sounds clear to auscultation. No wheezing or other adventitious sounds.   ABD: soft, NT, ND, BS+, no hepatosplenomegaly. Well-healed incision, left side. Hernia present just lateral of incision. Soft and non-tender, no pressure.   EXT: no deformities, no edema.   SKIN:  Warm, dry. No rashes or bruising. No jaundice.   NEURO: normal mentation, strength 5/5 on all 4 extremities, no sensory deficits   Muhammad catheter in place draining clear yellow urine.  Assessment:   1. 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  Keytruda started on 7/2/20. Changed to q6week on 12/23/20  CT C/A/P on 7/16/21 and 11/2021 essentially ERIC.  CT chest abdomen pelvis in April 2022 with ERIC  Previously discussed with patient and daughter the options to continue close surveillance versus restart treatment with pembrolizumab given patient's long history of ERIC and almost 2 years of treatment with pembrolizumab.  Patient and daughter would like to continue pembrolizumab with  plans to decrease dose or increase the dosing interval if he were to have recurrent side effects from treatment.  Will plan to obtain imaging q4-6 months or as clinically indicated.  Recent CT scan findings from June 2, 2023 looked very encouraging.    8/31/2023 Assessment:  Mr. Mccoy is doing well.  Subjective and objective assessment negative.  Has a Muhammad catheter for bladder stones, draining clear yellow urine. Tolerating treatment well.  Will have supra pubic cath reinserted in December for bladder stones.  On low dose Keflex until that time.  TSH is elevated.         Plan:   8/31/2023:    Labs reviewed and discussed with patient ok to proceed with Keytruda today.  Continue with Keflex per Dr. Arora to prevent UTI from Muhammda catheter.  Increase dose of Levothyroxine from 200 mcg PO QD to 225 mcg PO QD.  Patient to follow-up in 6 weeks repeat labs including free T4, TSH, CEA, and treatment.  CT Chest/abdomen/pelvis due in February 2024.      The patient is in agreement with today's plan of care.  All questions answered.  Patient is aware to contact our office for any problems or concerns prior to next visit.      Naila Mauricio, MSN-ED, APRN, AGACNP-BC, OCN

## 2023-12-01 ENCOUNTER — DOCUMENT SCAN (OUTPATIENT)
Dept: HOME HEALTH SERVICES | Facility: HOSPITAL | Age: 88
End: 2023-12-01
Payer: MEDICARE

## 2024-01-10 NOTE — PROGRESS NOTES
Subjective:      Patient ID: Stefano Mccoy is a 89 y.o. male.    Chief Complaint:  Here for my treatment.    History of Present Illness  Chief complaint: Metastatic colon cancer, MSI-H    Onc Hx:  7/2/20-7/9/20: injectafer x2  7/2/20-current: keytruda    HPI: 88 y/o M w/ PMHx of HTN, DVT postop after colectomy (was on Xarelto 11/2019-6/2020, Xarelto stopped due to GI bleed and anemia requiring frequent transfusions), VALENTIN due to GI blood loss, BPH first presented with hematochezia 10-11/2019. He had a colonoscopy with Dr Lima that showed a mass at 60cm, biopsy with poorly differentiated adenocarcinoma. On 11/26/19 he had a left colectomy with partial gastrectomy with Dr Liu, showing poorly differentiated adenocarcinoma invading gastric wall and visceral peritoneum and with 3/14 LNs+. Due to his advanced age, no chemotherapy was offered. 3/2020 he was noted to be anemic, given IV iron infusion and referred back to Dr Liu. He had imaging at [facility] showing widespread intra-abdominal metastatic disease. Referred to [facility] to establish care    He was given 2 doses of injectafer 7/2020  He was started on Keytruda for MSI-H metastatic colon cancer on 7/2/20    Interval history:     1/11/24:  Mr. Mccoy is here today with his daughter for his follow-up and treatment with Pembrolizumab Q 6 weeks for his metastatic colon cancer.  He recently had his supra public catheter placed on December 2023. He sees Dr. Arora, urologist.  He denies any fever, chills, cough, SOB, chest pain, rash, abnormal bleeding, abdominal pain, change in bowel habits, bone and joint pain, no lower extremity edema or neuropathy.  He reports tolerating treatment well. Weight is stable.  Eating and drinking well.  No recent hospitalizations, infections, or illnesses.   TSH is 25 today, continue 225 mcg dose of Levothyroxine. Instructed patient to take in the morning on an empty stomach and not to eat for at least 30 minutes.    Labs:  1/10/24:   Creatinine 1.3, Cortisol 15.1, TSH 25, and CEA pending, WBC 9.8 hgb 15.6, Hct 47.6, Plt 343,000.  2023:  Creatinine 1.13, Cortisol 12.9, TSH 26.36, and CEA 3.0, WBC 8.53 hgb 15.7, Hct 47.9, Plt 333,000.  10/11/2023:  Creatinine 1.11.  Cortisol 8.8 TSH 6.71 and CEA 2.1.  WBC 8.35 hgb 15.6, Hct 47.9, Plt 380,000  2023:  CBC WNL.  CMP WNL with creatinine 1.07.  Cortisol 12.2, TSH 4.33, and CEA 2.6  2023 CMP unremarkable, TSH 11.28, CBC unremarkable.  CEA pending.  Phosphorus 2.7.  2023 :  CMP dated 2023 is completely normal.  The TSH however is below 0.3 uIU/ml.  CBC shows hemoglobin of 15, white cell count 10,500 slightly elevated platelets 395, the differential is essentially normal.  April 10, 2023:  CMP dated 2023 is essentially normal except for a low albumin at 3.3 CBC is normal except for  abnormal indices , RBC and slight thrombocytosis  2023:  Creatinine 11.4, potassium 5.3, bicarb 20, sodium 131.  2023:  TSH 4.4, cortisol 13.6, CEA 2.2, creatinine 7.3, albumin 3.1, calcium 8.8, LFTs within normal limits, free T4 1.23, WBC count 10.2, hemoglobin 14.1, MCV 96.4, platelet count 3 1 7, ANC 7.82.  CEA 2.2      Imagin/15/2023:  CT of the chest with contrast:  Impression:  Mild fatty prominence along the right lateral chest wall felt to represent the abnormality seen on recent chest x-ray.  No evidence of any suspicious mass.    CT of the chest ,abdomen , and pelvis obtained on 2023, and compared  to  the 2022, reveals no evidence of metastatic disease in the lung.  There are in the abdomen various nodes that are borderline in size.  They are also stable nodes in the retroperitoneum.  A ventral hernia is seen and, he has multiple stones and diverticuli in the bladder.    2022:  Ultrasound thyroid.  Thyroid tissue not clearly identified, although thyroid gland may be diffusely heterogenous, although with no gross focal thyroid nodule  identified.  11/08/2022 CT chest abdomen pelvis with contrast:  No evidence of metastatic disease.  No significant change since prior study in the chest or abdomen.    04/26/2022 CT chest abdomen pelvis with contrast:  No evidence of metastatic disease or other significant change since previous CT dated 11/30/2021.      11/30/21 CT C/A/P: stable chest CT with no evidence of metastatic disease. No evidence of metastatic disease or other significant changes in abdomen/pelvis since prior CT    7/16/21 CT C/A/P w/ IV contrast: no evidence of metastatic disease in chest. No evidence of neoplastic disease within abd or pelvis. Fatty liver has worsened. Prostate enlargement. Multiple bladder calculi. Distal colonic diverticulosis. Atherosclerosis.    3/16/21 CT C/A/P: No significant changes and no evidence of metastatic disease in the chest. Decrease in the size of the small soft tissue density nodules in the left upper quadrant. No other significant changes.    11/30/20 CT C/A/P w/ IV contrast: no evidence of thoracic metastatic disease. Left upper quadrant abd wall thickening has decreased slightly. Small area of soft tissue density seen in left upper quadrant adjacent to colon. Measures minimally smaller than on prior exam. Additional area of soft tissue density adjacent to stomach and pancreatic tail has not changed. Overall, several small soft tissue masses remain with left abdomen and left abdominal wall, few of those minimally decreased in size from prior scan.    9/18/20 CT C/A/P w/ IV contrast: no evidence of thoracic metastatic disease. Significant decrease in size of previously described masses within abd wall on the left. Residual mild localized soft tissue thickening of upper anterior abd wall to the left of midline adjacent to left lobe of liver at site of previously demonstrated mass. Residual oval shaped soft tissue mass more caudally on the left [id number] suspicious for residual tumor. Also residual mass in  left upper quadrant between gastric body and tAil of pancreas that has also decreased in size and remains contiguous with gastric wall. Omental mass decreased to 3cm.    6/9/20 CT C/A/P w/ IV contrast: no significant cardiopulmonary abnormality. Subtle nodularity in right lobe of thyroid lobe. Liver unremarkable. Several solid lesions involving the abdominal wall. 3.9cm lesion in upper abd wall to left of midline. 4.9cm lesion at more caudal level. Several solid lesions are seen within omental fat of left upper quadrant. Portion of this appears to be attached to stomach.    4/9/20 RLE venous duplex: occlusive DVT right popliteal vein.    11/15/19 CT A/P w/ IV contrast: colonic mass at splenic flexure 7.6x4.3cm, contiguous with the stomach. Numerous diverticula in descending and sigmoid colon    Path:  11/26/19 left colectomy with partial gastrectomy: tumor site splenic flexure, greatest dimension 8.5cm, no perforation, adenocarcinoma, mod to poorly differentiated, tumor invades visceral peritoneum and directly invades gastric wall. Margins neg. +LVI, +PNI, no tumor deposits. 3/14 LNs+.  [id number]    11/15/19 colon 60cm biopsy: poorly differentiated adenocarcinoma.  MLH1 loss of expression. MSH2 and MSH6 no loss of expression, PMS2 loss of expression. MSI-H both by MMR IHC and MSI PCR       Past Medical History:   Diagnosis Date    BPH (benign prostatic hyperplasia)     Cancer     Colon cancer     DVT (deep venous thrombosis)     Hypertension        Past Surgical History:   Procedure Laterality Date    ADENOIDECTOMY      BLADDER STONE REMOVAL  08/17/2023    CHOLECYSTECTOMY      COLECTOMY      EYE SURGERY      TONSILLECTOMY         Family History   Problem Relation Age of Onset    Lung cancer Mother        Social History     Socioeconomic History    Marital status:    Tobacco Use    Smoking status: Former    Smokeless tobacco: Never   Substance and Sexual Activity    Alcohol use: Not Currently    Drug use: Never     Sexual activity: Not Currently       Current Outpatient Medications   Medication Sig Dispense Refill    cephALEXin (KEFLEX) 250 MG capsule Take 250 mg by mouth every evening.      finasteride (PROSCAR) 5 mg tablet Take 5 mg by mouth once daily.      levothyroxine (SYNTHROID) 200 MCG tablet Take 1 tablet (200 mcg total) by mouth once daily. 30 tablet 4    levothyroxine (SYNTHROID) 25 MCG tablet Take 1 tablet (25 mcg total) by mouth before breakfast. 30 tablet 11    tamsulosin (FLOMAX) 0.4 mg Cap Take 0.4 mg by mouth once daily.       No current facility-administered medications for this visit.       Review of patient's allergies indicates:  No Known Allergies      Review of Systems:  CONSTITUTIONAL: no fevers, no chills, no weight loss, + chronic fatigue.   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  no tinnitus or hearing loss, no nose bleeding, no dysphagia, no odynophagia, no dental pain.   CVS: no chest pain, no palpitations, no dyspnea on exertion  RESP: no shortness of breath, no hemoptysis, no cough  GI: no nausea, no vomiting, no diarrhea, no constipation, no melena, no hematochezia, no hematemesis, no abdominal pain, no increase in abdominal girth  : Muhammad Catheter, no dysuria, no hematuria, no hesitancy, no scrotal swelling, no discharge  INTEGUMENT: no rashes, no abnormal bruising, no nail pitting, no hyperpigmentation  NEURO: no falls, no memory loss, no paresthesias or dysesthesias, no urofecal incontinence or retention, no loss of strength on any extremity  MSK: no back pain, no new joint pain, no joint swelling, no muscle weakness  PSYCH: no suicidal or homicidal ideation, no depression, no insomnia, no anhedonia  ENDOCRINE: no heat or cold intolerance, no polyuria, no polydipsia    Objective:     Physical Exam:     GA: AAOx3, very pleasant elderly  gentleman.  Appears in no acute distress.    HEENT: NCAT, PERRL, EOMI, left eye ectropion, color blind Oropharynx moist, fair dentition. no oral ulcers.  LYMPH: no cervical, axillary or supraclavicular adenopathy  CVS: s1s2 RRR, no M/R/G  RESP: Lung sounds clear to auscultation. No wheezing or other adventitious sounds.   ABD: soft, NT, ND, BS+, no hepatosplenomegaly. Well-healed incision, left side. Hernia present just lateral of incision. Soft and non-tender, no pressure.   EXT: no deformities, no edema.   SKIN:  Warm, dry. No rashes or bruising. No jaundice.   NEURO: normal mentation, strength 5/5 on all 4 extremities, no sensory deficits   Suprapubic catheter in place  Assessment:   1. 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  Keytruda started on 7/2/20. Changed to q6week on 12/23/20  CT C/A/P on 7/16/21 and 11/2021 essentially ERIC.  CT chest abdomen pelvis in April 2022 with ERIC  Previously discussed with patient and daughter the options to continue close surveillance versus restart treatment with pembrolizumab given patient's long history of ERIC and almost 2 years of treatment with pembrolizumab.  Patient and daughter would like to continue pembrolizumab with plans to decrease dose or increase the dosing interval if he were to have recurrent side effects from treatment.  Will plan to obtain imaging q4-6 months or as clinically indicated.  Recent CT scan findings from June 2, 2023 looked very encouraging.        Plan:   1/11/24:    Labs reviewed and discussed with patient ok to proceed with Keytruda today.  Continue Levothyroxine 225 mcg PO QD  Patient to follow-up in 6 weeks repeat labs including free T4, TSH, CEA, and treatment.  CT Chest/abdomen/pelvis due ordered for 2/1/24.      The patient is in agreement with today's plan of care.  All questions answered.  Patient is aware to contact our office for any problems or concerns prior to next visit.      Renee PEREIRA

## 2024-01-11 ENCOUNTER — OFFICE VISIT (OUTPATIENT)
Dept: HEMATOLOGY/ONCOLOGY | Facility: CLINIC | Age: 89
End: 2024-01-11
Payer: MEDICARE

## 2024-01-11 VITALS
BODY MASS INDEX: 42.38 KG/M2 | TEMPERATURE: 98 F | HEART RATE: 80 BPM | SYSTOLIC BLOOD PRESSURE: 130 MMHG | HEIGHT: 67 IN | WEIGHT: 270 LBS | OXYGEN SATURATION: 95 % | RESPIRATION RATE: 18 BRPM | DIASTOLIC BLOOD PRESSURE: 85 MMHG

## 2024-01-11 DIAGNOSIS — C18.9 COLON CANCER METASTASIZED TO MULTIPLE SITES: Primary | ICD-10-CM

## 2024-01-11 PROCEDURE — 99215 OFFICE O/P EST HI 40 MIN: CPT | Mod: ,,,

## 2024-01-11 RX ORDER — SODIUM CHLORIDE 0.9 % (FLUSH) 0.9 %
10 SYRINGE (ML) INJECTION
Status: CANCELLED | OUTPATIENT
Start: 2024-01-11

## 2024-01-11 RX ORDER — HEPARIN 100 UNIT/ML
500 SYRINGE INTRAVENOUS
Status: CANCELLED | OUTPATIENT
Start: 2024-01-11

## 2024-01-11 RX ORDER — EPINEPHRINE 0.3 MG/.3ML
0.3 INJECTION SUBCUTANEOUS ONCE AS NEEDED
Status: CANCELLED | OUTPATIENT
Start: 2024-01-11

## 2024-01-11 RX ORDER — DIPHENHYDRAMINE HYDROCHLORIDE 50 MG/ML
50 INJECTION, SOLUTION INTRAMUSCULAR; INTRAVENOUS ONCE AS NEEDED
Status: CANCELLED | OUTPATIENT
Start: 2024-01-11

## 2024-02-22 ENCOUNTER — OFFICE VISIT (OUTPATIENT)
Dept: HEMATOLOGY/ONCOLOGY | Facility: CLINIC | Age: 89
End: 2024-02-22
Payer: MEDICARE

## 2024-02-22 VITALS
RESPIRATION RATE: 20 BRPM | HEIGHT: 67 IN | HEART RATE: 65 BPM | WEIGHT: 270 LBS | SYSTOLIC BLOOD PRESSURE: 133 MMHG | BODY MASS INDEX: 42.38 KG/M2 | DIASTOLIC BLOOD PRESSURE: 85 MMHG | OXYGEN SATURATION: 94 % | TEMPERATURE: 98 F

## 2024-02-22 DIAGNOSIS — C18.9 COLON CANCER METASTASIZED TO MULTIPLE SITES: Primary | ICD-10-CM

## 2024-02-22 DIAGNOSIS — E03.9 HYPOTHYROIDISM, UNSPECIFIED TYPE: ICD-10-CM

## 2024-02-22 DIAGNOSIS — Z29.89 ENCOUNTER FOR IMMUNOTHERAPY: ICD-10-CM

## 2024-02-22 PROCEDURE — 99215 OFFICE O/P EST HI 40 MIN: CPT | Mod: ,,,

## 2024-02-22 RX ORDER — OXYCODONE AND ACETAMINOPHEN 10; 325 MG/1; MG/1
1 TABLET ORAL
COMMUNITY
Start: 2024-02-09

## 2024-02-22 RX ORDER — SODIUM CHLORIDE 0.9 % (FLUSH) 0.9 %
10 SYRINGE (ML) INJECTION
Status: CANCELLED | OUTPATIENT
Start: 2024-02-22

## 2024-02-22 RX ORDER — LEVOTHYROXINE SODIUM 300 UG/1
0.3 TABLET ORAL
COMMUNITY

## 2024-02-22 RX ORDER — EPINEPHRINE 0.3 MG/.3ML
0.3 INJECTION SUBCUTANEOUS ONCE AS NEEDED
Status: CANCELLED | OUTPATIENT
Start: 2024-02-22

## 2024-02-22 RX ORDER — DIPHENHYDRAMINE HYDROCHLORIDE 50 MG/ML
50 INJECTION INTRAMUSCULAR; INTRAVENOUS ONCE AS NEEDED
Status: CANCELLED | OUTPATIENT
Start: 2024-02-22

## 2024-02-22 RX ORDER — HEPARIN 100 UNIT/ML
500 SYRINGE INTRAVENOUS
Status: CANCELLED | OUTPATIENT
Start: 2024-02-22

## 2024-04-04 ENCOUNTER — OFFICE VISIT (OUTPATIENT)
Dept: HEMATOLOGY/ONCOLOGY | Facility: CLINIC | Age: 89
End: 2024-04-04
Payer: MEDICARE

## 2024-04-04 VITALS
HEIGHT: 67 IN | WEIGHT: 276.13 LBS | OXYGEN SATURATION: 95 % | BODY MASS INDEX: 43.34 KG/M2 | SYSTOLIC BLOOD PRESSURE: 133 MMHG | HEART RATE: 68 BPM | TEMPERATURE: 99 F | DIASTOLIC BLOOD PRESSURE: 75 MMHG | RESPIRATION RATE: 18 BRPM

## 2024-04-04 DIAGNOSIS — C18.9 COLON CANCER METASTASIZED TO MULTIPLE SITES: Primary | ICD-10-CM

## 2024-04-04 DIAGNOSIS — Z29.89 ENCOUNTER FOR IMMUNOTHERAPY: ICD-10-CM

## 2024-04-04 DIAGNOSIS — E03.9 HYPOTHYROIDISM, UNSPECIFIED TYPE: ICD-10-CM

## 2024-04-04 PROCEDURE — 99215 OFFICE O/P EST HI 40 MIN: CPT | Mod: ,,, | Performed by: STUDENT IN AN ORGANIZED HEALTH CARE EDUCATION/TRAINING PROGRAM

## 2024-04-04 RX ORDER — SODIUM CHLORIDE 0.9 % (FLUSH) 0.9 %
10 SYRINGE (ML) INJECTION
Status: CANCELLED | OUTPATIENT
Start: 2024-04-04

## 2024-04-04 RX ORDER — HEPARIN 100 UNIT/ML
500 SYRINGE INTRAVENOUS
Status: CANCELLED | OUTPATIENT
Start: 2024-04-04

## 2024-04-04 RX ORDER — DIPHENHYDRAMINE HYDROCHLORIDE 50 MG/ML
50 INJECTION INTRAMUSCULAR; INTRAVENOUS ONCE AS NEEDED
Status: CANCELLED | OUTPATIENT
Start: 2024-04-04

## 2024-04-04 RX ORDER — EPINEPHRINE 0.3 MG/.3ML
0.3 INJECTION SUBCUTANEOUS ONCE AS NEEDED
Status: CANCELLED | OUTPATIENT
Start: 2024-04-04

## 2024-04-04 NOTE — PROGRESS NOTES
Subjective:      Patient ID: Stefano Mccoy is a 89 y.o. male.    Chief Complaint:  Here for my treatment.    History of Present Illness  Chief complaint: Metastatic colon cancer, MSI-H    Onc Hx:  7/2/20-7/9/20: injectafer x2  7/2/20-current: keytruda    HPI: 88 y/o M w/ PMHx of HTN, DVT postop after colectomy (was on Xarelto 11/2019-6/2020, Xarelto stopped due to GI bleed and anemia requiring frequent transfusions), VALENTIN due to GI blood loss, BPH first presented with hematochezia 10-11/2019. He had a colonoscopy with Dr Lima that showed a mass at 60cm, biopsy with poorly differentiated adenocarcinoma. On 11/26/19 he had a left colectomy with partial gastrectomy with Dr Liu, showing poorly differentiated adenocarcinoma invading gastric wall and visceral peritoneum and with 3/14 LNs+. Due to his advanced age, no chemotherapy was offered. 3/2020 he was noted to be anemic, given IV iron infusion and referred back to Dr Liu. He had imaging at [facility] showing widespread intra-abdominal metastatic disease. Referred to [facility] to establish care    He was given 2 doses of injectafer 7/2020  He was started on Keytruda for MSI-H metastatic colon cancer on 7/2/20    Interval history:     Today, 04/04/2024, patient denies any acute concerns today.  He reports tolerating his last cycle of treatment without any significant side effects.  He denies any change in his bowel habits, symptoms of abdominal pain, decreased appetite or weight loss.  He denies any new medications, ER or hospital visits.  He reports compliance with Synthroid.      Labs:    04/03/2024 creatinine 1.12, albumin 3.6, calcium 9.8, CEA 2.8, TSH 12.9, cortisol 14.5, hemoglobin 16.3, MCV 95.9, WBC count 10.1, platelet count 379, ANC 6.76.  2/21/24 Cr 1.2, tsh 15.63, CEA pending, WBC 8.7, Hgb 16, , ANC 5.88   1/10/24:  Creatinine 1.3, Cortisol 15.1, TSH 25, and CEA pending, WBC 9.8 hgb 15.6, Hct 47.6, Plt 343,000.  11/29/2023:  Creatinine 1.13, Cortisol  12.9, TSH 26.36, and CEA 3.0, WBC 8.53 hgb 15.7, Hct 47.9, Plt 333,000.  10/11/2023:  Creatinine 1.11.  Cortisol 8.8 TSH 6.71 and CEA 2.1.  WBC 8.35 hgb 15.6, Hct 47.9, Plt 380,000  2023:  CBC WNL.  CMP WNL with creatinine 1.07.  Cortisol 12.2, TSH 4.33, and CEA 2.6  2023 CMP unremarkable, TSH 11.28, CBC unremarkable.  CEA pending.  Phosphorus 2.7.  2023 :  CMP dated 2023 is completely normal.  The TSH however is below 0.3 uIU/ml.  CBC shows hemoglobin of 15, white cell count 10,500 slightly elevated platelets 395, the differential is essentially normal.  April 10, 2023:  CMP dated 2023 is essentially normal except for a low albumin at 3.3 CBC is normal except for  abnormal indices , RBC and slight thrombocytosis  2023:  Creatinine 11.4, potassium 5.3, bicarb 20, sodium 131.  2023:  TSH 4.4, cortisol 13.6, CEA 2.2, creatinine 7.3, albumin 3.1, calcium 8.8, LFTs within normal limits, free T4 1.23, WBC count 10.2, hemoglobin 14.1, MCV 96.4, platelet count 3 1 7, ANC 7.82.  CEA 2.2      Imagin2024 CT chest abdomen pelvis with contrast no significant change.  No evidence of metastatic disease to the chest.  Chronic findings as noted above.  No significant change in the abdomen.  No evidence of residual or recurrent tumor.  No evidence of metastatic disease to the abdomen or pelvis.  Removal of Muhammad catheter with placement of suprapubic bladder catheter.  There was persistent inflammatory stranding along the superior aspect of the bladder with numerous tiny bladder diverticuli.  No evidence of extraluminal air or fluid to suggest perforation.  Status post cholecystectomy.  Diffuse fatty infiltration of the liver.  Stable right renal cysts.  Other chronic findings as noted above.    8/15/2023:  CT of the chest with contrast:  Impression:  Mild fatty prominence along the right lateral chest wall felt to represent the abnormality seen on recent chest x-ray.  No  evidence of any suspicious mass.    CT of the chest ,abdomen , and pelvis obtained on June 2, 2023, and compared  to  the November 08/2022, reveals no evidence of metastatic disease in the lung.  There are in the abdomen various nodes that are borderline in size.  They are also stable nodes in the retroperitoneum.  A ventral hernia is seen and, he has multiple stones and diverticuli in the bladder.    12/19/2022:  Ultrasound thyroid.  Thyroid tissue not clearly identified, although thyroid gland may be diffusely heterogenous, although with no gross focal thyroid nodule identified.  11/08/2022 CT chest abdomen pelvis with contrast:  No evidence of metastatic disease.  No significant change since prior study in the chest or abdomen.    04/26/2022 CT chest abdomen pelvis with contrast:  No evidence of metastatic disease or other significant change since previous CT dated 11/30/2021.      11/30/21 CT C/A/P: stable chest CT with no evidence of metastatic disease. No evidence of metastatic disease or other significant changes in abdomen/pelvis since prior CT    7/16/21 CT C/A/P w/ IV contrast: no evidence of metastatic disease in chest. No evidence of neoplastic disease within abd or pelvis. Fatty liver has worsened. Prostate enlargement. Multiple bladder calculi. Distal colonic diverticulosis. Atherosclerosis.    3/16/21 CT C/A/P: No significant changes and no evidence of metastatic disease in the chest. Decrease in the size of the small soft tissue density nodules in the left upper quadrant. No other significant changes.    11/30/20 CT C/A/P w/ IV contrast: no evidence of thoracic metastatic disease. Left upper quadrant abd wall thickening has decreased slightly. Small area of soft tissue density seen in left upper quadrant adjacent to colon. Measures minimally smaller than on prior exam. Additional area of soft tissue density adjacent to stomach and pancreatic tail has not changed. Overall, several small soft tissue  masses remain with left abdomen and left abdominal wall, few of those minimally decreased in size from prior scan.    9/18/20 CT C/A/P w/ IV contrast: no evidence of thoracic metastatic disease. Significant decrease in size of previously described masses within abd wall on the left. Residual mild localized soft tissue thickening of upper anterior abd wall to the left of midline adjacent to left lobe of liver at site of previously demonstrated mass. Residual oval shaped soft tissue mass more caudally on the left [id number] suspicious for residual tumor. Also residual mass in left upper quadrant between gastric body and tAil of pancreas that has also decreased in size and remains contiguous with gastric wall. Omental mass decreased to 3cm.    6/9/20 CT C/A/P w/ IV contrast: no significant cardiopulmonary abnormality. Subtle nodularity in right lobe of thyroid lobe. Liver unremarkable. Several solid lesions involving the abdominal wall. 3.9cm lesion in upper abd wall to left of midline. 4.9cm lesion at more caudal level. Several solid lesions are seen within omental fat of left upper quadrant. Portion of this appears to be attached to stomach.    4/9/20 RLE venous duplex: occlusive DVT right popliteal vein.    11/15/19 CT A/P w/ IV contrast: colonic mass at splenic flexure 7.6x4.3cm, contiguous with the stomach. Numerous diverticula in descending and sigmoid colon    Path:  11/26/19 left colectomy with partial gastrectomy: tumor site splenic flexure, greatest dimension 8.5cm, no perforation, adenocarcinoma, mod to poorly differentiated, tumor invades visceral peritoneum and directly invades gastric wall. Margins neg. +LVI, +PNI, no tumor deposits. 3/14 LNs+.  [id number]    11/15/19 colon 60cm biopsy: poorly differentiated adenocarcinoma.  MLH1 loss of expression. MSH2 and MSH6 no loss of expression, PMS2 loss of expression. MSI-H both by MMR IHC and MSI PCR       Past Medical History:   Diagnosis Date    BPH (benign prostatic  hyperplasia)     Cancer     Colon cancer     DVT (deep venous thrombosis)     Hypertension     Suprapubic catheter 12/06/2023       Past Surgical History:   Procedure Laterality Date    ADENOIDECTOMY      BLADDER STONE REMOVAL  08/17/2023    CHOLECYSTECTOMY      COLECTOMY      EYE SURGERY      TONSILLECTOMY         Family History   Problem Relation Age of Onset    Lung cancer Mother        Social History     Socioeconomic History    Marital status:    Tobacco Use    Smoking status: Former    Smokeless tobacco: Never   Substance and Sexual Activity    Alcohol use: Not Currently    Drug use: Never    Sexual activity: Not Currently       Current Outpatient Medications   Medication Sig Dispense Refill    cephALEXin (KEFLEX) 250 MG capsule Take 250 mg by mouth every evening.      finasteride (PROSCAR) 5 mg tablet Take 5 mg by mouth once daily.      levothyroxine (SYNTHROID) 200 MCG tablet Take 1 tablet (200 mcg total) by mouth once daily. (Patient taking differently: Take 300 mcg by mouth once daily.) 30 tablet 4    levothyroxine (SYNTHROID) 300 MCG Tab Take 0.3 mg by mouth before breakfast.      levothyroxine (SYNTHROID) 300 MCG Tab Take 0.3 mg by mouth before breakfast.      oxyCODONE-acetaminophen (PERCOCET)  mg per tablet Take 1 tablet by mouth as needed.      levothyroxine (SYNTHROID) 25 MCG tablet Take 1 tablet (25 mcg total) by mouth before breakfast. (Patient not taking: Reported on 1/11/2024) 30 tablet 11    tamsulosin (FLOMAX) 0.4 mg Cap Take 0.4 mg by mouth once daily.       No current facility-administered medications for this visit.       Review of patient's allergies indicates:  No Known Allergies      Review of Systems:  CONSTITUTIONAL: no fevers, no chills, no weight loss, + chronic fatigue.   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  no tinnitus  or hearing loss, no nose bleeding, no dysphagia, no odynophagia, no dental pain.   CVS: no chest pain, no palpitations, no dyspnea on exertion  RESP: no shortness of breath, no hemoptysis, no cough  GI: no nausea, no vomiting, no diarrhea, no constipation, no melena, no hematochezia, no hematemesis, no abdominal pain, no increase in abdominal girth  : Indwelling suprapubic Catheter, no dysuria, no hematuria, no hesitancy, no scrotal swelling, no discharge  INTEGUMENT: no rashes, no abnormal bruising, no nail pitting, no hyperpigmentation  NEURO: no falls, no memory loss, no paresthesias or dysesthesias, no urofecal incontinence or retention, no loss of strength on any extremity  MSK: no back pain, no new joint pain, no joint swelling, no muscle weakness  PSYCH: no suicidal or homicidal ideation, no depression, no insomnia, no anhedonia  ENDOCRINE: no heat or cold intolerance, no polyuria, no polydipsia    Objective:     Physical Exam:     GA: AAOx3, very pleasant elderly gentleman.  Appears in no acute distress.    HEENT: NCAT, PERRL, EOMI, left eye ectropion, color blind Oropharynx moist, fair dentition. no oral ulcers.  LYMPH: no cervical, axillary or supraclavicular adenopathy  CVS: s1s2 RRR, no M/R/G  RESP: Lung sounds clear to auscultation. No wheezing or other adventitious sounds.   ABD: soft, NT, ND, BS+, no hepatosplenomegaly. . Soft and non-tender, no pressure.   EXT: no deformities, no edema.   SKIN:  Warm, dry. No rashes or bruising. No jaundice.   NEURO: normal mentation, strength 5/5 on all 4 extremities, no sensory deficits   Suprapubic catheter in place      Assessment:   1. Colon cancer of descending colon and splenic flexure C18.6 with extensive intra-abdominal disease a nodes and mesentery  Splenic flexure, poorly differentiated adenocarcinoma, +LVI, +PNI, invasion of visceral peritoneum and gastric wall, 3/14 LNs+, diagnosed 11/2019 and s/p resection  Due to advanced age he was not offered  chemotherapy at that time  Recurrence imaging confirmed 6/2020  Of note, MSI-H from initial biopsy  FDA approval for keytruda for MSI-H frontline, discussed options of keytruda vs chemo and pt agreed to proceed with keytruda.  Keytruda started on 7/2/20. Changed to q6week on 12/23/20  CT C/A/P on 7/16/21 and 11/2021 essentially ERIC.  CT chest abdomen pelvis in April 2022 with ERIC  Previously discussed with patient and daughter the options to continue close surveillance versus restart treatment with pembrolizumab given patient's long history of ERIC and almost 2 years of treatment with pembrolizumab.  Patient and daughter would like to continue pembrolizumab with plans to decrease dose or increase the dosing interval if he were to have recurrent side effects from treatment.  Will plan to obtain imaging q4-6 months or as clinically indicated.       Plan:     Labs reviewed and discussed with patient ok to proceed with Keytruda today.  Continue Levothyroxine 225 mcg PO QD  Patient to follow-up in 6 weeks repeat labs including free T4, TSH, CEA, and treatment.        Portions of the record may have been created with voice recognition software. Occasional wrong-word or sound-a-like substitutions may have occurred due to the inherent limitations of voice recognition software.

## 2024-05-14 NOTE — PROGRESS NOTES
Subjective:      Patient ID: Stefano Mccoy is a 89 y.o. male.    Chief Complaint:  Here for my treatment.    History of Present Illness  Chief complaint: Metastatic colon cancer, MSI-H    Onc Hx:  7/2/20-7/9/20: injectafer x2  7/2/20-current: keytruda    HPI: 86 y/o M w/ PMHx of HTN, DVT postop after colectomy (was on Xarelto 11/2019-6/2020, Xarelto stopped due to GI bleed and anemia requiring frequent transfusions), VALENTIN due to GI blood loss, BPH first presented with hematochezia 10-11/2019. He had a colonoscopy with Dr Lima that showed a mass at 60cm, biopsy with poorly differentiated adenocarcinoma. On 11/26/19 he had a left colectomy with partial gastrectomy with Dr Liu, showing poorly differentiated adenocarcinoma invading gastric wall and visceral peritoneum and with 3/14 LNs+. Due to his advanced age, no chemotherapy was offered. 3/2020 he was noted to be anemic, given IV iron infusion and referred back to Dr Liu. He had imaging at [facility] showing widespread intra-abdominal metastatic disease. Referred to [facility] to establish care    He was given 2 doses of injectafer 7/2020  He was started on Keytruda for MSI-H metastatic colon cancer on 7/2/20    Interval history:     Today, 05/16/2024, patient denies any acute concerns today.  He reports tolerating his last cycle of treatment without any significant side effects.  He denies any change in his bowel habits, symptoms of abdominal pain, decreased appetite or weight loss.  He denies any new medications, ER or hospital visits.  He reports compliance with Synthroid. TSH WNL today      Labs:  5/15/2024 creatinine 1.12, albumin 3.3, calcium 8.9, CEA 2.3, TSH 1.9118, WBC 7.09, hemoglobin 15.3, MCV 94.9, platelet count 348, ANC 4.56    04/03/2024 creatinine 1.12, albumin 3.6, calcium 9.8, CEA 2.8, TSH 12.9, cortisol 14.5, hemoglobin 16.3, MCV 95.9, WBC count 10.1, platelet count 379, ANC 6.76.  2/21/24 Cr 1.2, tsh 15.63, CEA pending, WBC 8.7, Hgb 16, , ANC  5.88   1/10/24:  Creatinine 1.3, Cortisol 15.1, TSH 25, and CEA pending, WBC 9.8 hgb 15.6, Hct 47.6, Plt 343,000.  2023:  Creatinine 1.13, Cortisol 12.9, TSH 26.36, and CEA 3.0, WBC 8.53 hgb 15.7, Hct 47.9, Plt 333,000.  10/11/2023:  Creatinine 1.11.  Cortisol 8.8 TSH 6.71 and CEA 2.1.  WBC 8.35 hgb 15.6, Hct 47.9, Plt 380,000  2023:  CBC WNL.  CMP WNL with creatinine 1.07.  Cortisol 12.2, TSH 4.33, and CEA 2.6  2023 CMP unremarkable, TSH 11.28, CBC unremarkable.  CEA pending.  Phosphorus 2.7.  2023 :  CMP dated 2023 is completely normal.  The TSH however is below 0.3 uIU/ml.  CBC shows hemoglobin of 15, white cell count 10,500 slightly elevated platelets 395, the differential is essentially normal.  April 10, 2023:  CMP dated 2023 is essentially normal except for a low albumin at 3.3 CBC is normal except for  abnormal indices , RBC and slight thrombocytosis  2023:  Creatinine 11.4, potassium 5.3, bicarb 20, sodium 131.  2023:  TSH 4.4, cortisol 13.6, CEA 2.2, creatinine 7.3, albumin 3.1, calcium 8.8, LFTs within normal limits, free T4 1.23, WBC count 10.2, hemoglobin 14.1, MCV 96.4, platelet count 3 1 7, ANC 7.82.  CEA 2.2      Imagin2024 CT chest abdomen pelvis with contrast no significant change.  No evidence of metastatic disease to the chest.  Chronic findings as noted above.  No significant change in the abdomen.  No evidence of residual or recurrent tumor.  No evidence of metastatic disease to the abdomen or pelvis.  Removal of Muhammad catheter with placement of suprapubic bladder catheter.  There was persistent inflammatory stranding along the superior aspect of the bladder with numerous tiny bladder diverticuli.  No evidence of extraluminal air or fluid to suggest perforation.  Status post cholecystectomy.  Diffuse fatty infiltration of the liver.  Stable right renal cysts.  Other chronic findings as noted above.    8/15/2023:  CT of the chest with  contrast:  Impression:  Mild fatty prominence along the right lateral chest wall felt to represent the abnormality seen on recent chest x-ray.  No evidence of any suspicious mass.    CT of the chest ,abdomen , and pelvis obtained on June 2, 2023, and compared  to  the November 08/2022, reveals no evidence of metastatic disease in the lung.  There are in the abdomen various nodes that are borderline in size.  They are also stable nodes in the retroperitoneum.  A ventral hernia is seen and, he has multiple stones and diverticuli in the bladder.    12/19/2022:  Ultrasound thyroid.  Thyroid tissue not clearly identified, although thyroid gland may be diffusely heterogenous, although with no gross focal thyroid nodule identified.  11/08/2022 CT chest abdomen pelvis with contrast:  No evidence of metastatic disease.  No significant change since prior study in the chest or abdomen.    04/26/2022 CT chest abdomen pelvis with contrast:  No evidence of metastatic disease or other significant change since previous CT dated 11/30/2021.      11/30/21 CT C/A/P: stable chest CT with no evidence of metastatic disease. No evidence of metastatic disease or other significant changes in abdomen/pelvis since prior CT    7/16/21 CT C/A/P w/ IV contrast: no evidence of metastatic disease in chest. No evidence of neoplastic disease within abd or pelvis. Fatty liver has worsened. Prostate enlargement. Multiple bladder calculi. Distal colonic diverticulosis. Atherosclerosis.    3/16/21 CT C/A/P: No significant changes and no evidence of metastatic disease in the chest. Decrease in the size of the small soft tissue density nodules in the left upper quadrant. No other significant changes.    11/30/20 CT C/A/P w/ IV contrast: no evidence of thoracic metastatic disease. Left upper quadrant abd wall thickening has decreased slightly. Small area of soft tissue density seen in left upper quadrant adjacent to colon. Measures minimally smaller than  on prior exam. Additional area of soft tissue density adjacent to stomach and pancreatic tail has not changed. Overall, several small soft tissue masses remain with left abdomen and left abdominal wall, few of those minimally decreased in size from prior scan.    9/18/20 CT C/A/P w/ IV contrast: no evidence of thoracic metastatic disease. Significant decrease in size of previously described masses within abd wall on the left. Residual mild localized soft tissue thickening of upper anterior abd wall to the left of midline adjacent to left lobe of liver at site of previously demonstrated mass. Residual oval shaped soft tissue mass more caudally on the left [id number] suspicious for residual tumor. Also residual mass in left upper quadrant between gastric body and tAil of pancreas that has also decreased in size and remains contiguous with gastric wall. Omental mass decreased to 3cm.    6/9/20 CT C/A/P w/ IV contrast: no significant cardiopulmonary abnormality. Subtle nodularity in right lobe of thyroid lobe. Liver unremarkable. Several solid lesions involving the abdominal wall. 3.9cm lesion in upper abd wall to left of midline. 4.9cm lesion at more caudal level. Several solid lesions are seen within omental fat of left upper quadrant. Portion of this appears to be attached to stomach.    4/9/20 RLE venous duplex: occlusive DVT right popliteal vein.    11/15/19 CT A/P w/ IV contrast: colonic mass at splenic flexure 7.6x4.3cm, contiguous with the stomach. Numerous diverticula in descending and sigmoid colon    Path:  11/26/19 left colectomy with partial gastrectomy: tumor site splenic flexure, greatest dimension 8.5cm, no perforation, adenocarcinoma, mod to poorly differentiated, tumor invades visceral peritoneum and directly invades gastric wall. Margins neg. +LVI, +PNI, no tumor deposits. 3/14 LNs+.  [id number]    11/15/19 colon 60cm biopsy: poorly differentiated adenocarcinoma.  MLH1 loss of expression. MSH2 and MSH6 no  loss of expression, PMS2 loss of expression. MSI-H both by MMR IHC and MSI PCR       Past Medical History:   Diagnosis Date    BPH (benign prostatic hyperplasia)     Cancer     Colon cancer     DVT (deep venous thrombosis)     Hypertension     Suprapubic catheter 12/06/2023       Past Surgical History:   Procedure Laterality Date    ADENOIDECTOMY      BLADDER STONE REMOVAL  08/17/2023    CHOLECYSTECTOMY      COLECTOMY      EYE SURGERY      TONSILLECTOMY         Family History   Problem Relation Name Age of Onset    Lung cancer Mother         Social History     Socioeconomic History    Marital status:    Tobacco Use    Smoking status: Former    Smokeless tobacco: Never   Substance and Sexual Activity    Alcohol use: Not Currently    Drug use: Never    Sexual activity: Not Currently       Current Outpatient Medications   Medication Sig Dispense Refill    cephALEXin (KEFLEX) 250 MG capsule Take 250 mg by mouth every evening.      finasteride (PROSCAR) 5 mg tablet Take 5 mg by mouth once daily.      levothyroxine (SYNTHROID) 200 MCG tablet Take 1 tablet (200 mcg total) by mouth once daily. (Patient taking differently: Take 300 mcg by mouth once daily.) 30 tablet 4    levothyroxine (SYNTHROID) 25 MCG tablet Take 1 tablet (25 mcg total) by mouth before breakfast. (Patient not taking: Reported on 1/11/2024) 30 tablet 11    levothyroxine (SYNTHROID) 300 MCG Tab Take 0.3 mg by mouth before breakfast.      levothyroxine (SYNTHROID) 300 MCG Tab Take 0.3 mg by mouth before breakfast.      oxyCODONE-acetaminophen (PERCOCET)  mg per tablet Take 1 tablet by mouth as needed.      tamsulosin (FLOMAX) 0.4 mg Cap Take 0.4 mg by mouth once daily.       No current facility-administered medications for this visit.       Review of patient's allergies indicates:  No Known Allergies      Review of Systems:  CONSTITUTIONAL: no fevers, no chills, no weight loss, + chronic fatigue.   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  no tinnitus or hearing loss, no nose bleeding, no dysphagia, no odynophagia, no dental pain.   CVS: no chest pain, no palpitations, no dyspnea on exertion  RESP: no shortness of breath, no hemoptysis, no cough  GI: no nausea, no vomiting, no diarrhea, no constipation, no melena, no hematochezia, no hematemesis, no abdominal pain, no increase in abdominal girth  : Indwelling suprapubic Catheter, no dysuria, no hematuria, no hesitancy, no scrotal swelling, no discharge  INTEGUMENT: no rashes, no abnormal bruising, no nail pitting, no hyperpigmentation  NEURO: no falls, no memory loss, no paresthesias or dysesthesias, no urofecal incontinence or retention, no loss of strength on any extremity  MSK: no back pain, no new joint pain, no joint swelling, no muscle weakness  PSYCH: no suicidal or homicidal ideation, no depression, no insomnia, no anhedonia  ENDOCRINE: no heat or cold intolerance, no polyuria, no polydipsia    Objective:     Physical Exam:     GA: AAOx3, very pleasant elderly gentleman.  Appears in no acute distress.    HEENT: NCAT, PERRL, EOMI, left eye ectropion, color blind Oropharynx moist, fair dentition. no oral ulcers.  LYMPH: no cervical, axillary or supraclavicular adenopathy  CVS: s1s2 RRR, no M/R/G  RESP: Lung sounds clear to auscultation. No wheezing or other adventitious sounds.   ABD: soft, NT, ND, BS+, no hepatosplenomegaly. . Soft and non-tender, no pressure.   EXT: no deformities, no edema.   SKIN:  Warm, dry. No rashes or bruising. No jaundice.   NEURO: normal mentation, strength 5/5 on all 4 extremities, no sensory deficits   Suprapubic catheter in place      Assessment:   1. Colon cancer of descending colon and splenic flexure C18.6 with extensive intra-abdominal disease a nodes and mesentery  Splenic flexure, poorly differentiated adenocarcinoma,  +LVI, +PNI, invasion of visceral peritoneum and gastric wall, 3/14 LNs+, diagnosed 11/2019 and s/p resection  Due to advanced age he was not offered chemotherapy at that time  Recurrence imaging confirmed 6/2020  Of note, MSI-H from initial biopsy  FDA approval for keytruda for MSI-H frontline, discussed options of keytruda vs chemo and pt agreed to proceed with keytruda.  Keytruda started on 7/2/20. Changed to q6week on 12/23/20  CT C/A/P on 7/16/21 and 11/2021 essentially ERIC.  CT chest abdomen pelvis in April 2022 with ERIC  Previously discussed with patient and daughter the options to continue close surveillance versus restart treatment with pembrolizumab given patient's long history of ERIC and almost 2 years of treatment with pembrolizumab.  Patient and daughter would like to continue pembrolizumab with plans to decrease dose or increase the dosing interval if he were to have recurrent side effects from treatment.  Will plan to obtain imaging q4-6 months or as clinically indicated.       Plan:   5/16/24  Labs reviewed and discussed with patient ok to proceed with Keytruda today.  Continue Levothyroxine 225 mcg PO QD  Patient to follow-up in 6 weeks repeat labs including free T4, TSH, CEA, and treatment.    Renee COWARTP-C

## 2024-05-16 ENCOUNTER — OFFICE VISIT (OUTPATIENT)
Dept: HEMATOLOGY/ONCOLOGY | Facility: CLINIC | Age: 89
End: 2024-05-16
Payer: MEDICARE

## 2024-05-16 VITALS
DIASTOLIC BLOOD PRESSURE: 88 MMHG | OXYGEN SATURATION: 95 % | HEART RATE: 68 BPM | BODY MASS INDEX: 41.57 KG/M2 | SYSTOLIC BLOOD PRESSURE: 141 MMHG | WEIGHT: 264.88 LBS | HEIGHT: 67 IN | TEMPERATURE: 98 F | RESPIRATION RATE: 18 BRPM

## 2024-05-16 DIAGNOSIS — E03.9 HYPOTHYROIDISM, UNSPECIFIED TYPE: ICD-10-CM

## 2024-05-16 DIAGNOSIS — E04.1 THYROID NODULE: ICD-10-CM

## 2024-05-16 DIAGNOSIS — C18.9 COLON CANCER METASTASIZED TO MULTIPLE SITES: Primary | ICD-10-CM

## 2024-05-16 DIAGNOSIS — Z29.89 ENCOUNTER FOR IMMUNOTHERAPY: ICD-10-CM

## 2024-05-16 PROCEDURE — 99215 OFFICE O/P EST HI 40 MIN: CPT | Mod: ,,,

## 2024-05-16 RX ORDER — EPINEPHRINE 0.3 MG/.3ML
0.3 INJECTION SUBCUTANEOUS ONCE AS NEEDED
Status: CANCELLED | OUTPATIENT
Start: 2024-05-16

## 2024-05-16 RX ORDER — SODIUM CHLORIDE 0.9 % (FLUSH) 0.9 %
10 SYRINGE (ML) INJECTION
Status: CANCELLED | OUTPATIENT
Start: 2024-05-16

## 2024-05-16 RX ORDER — DIPHENHYDRAMINE HYDROCHLORIDE 50 MG/ML
50 INJECTION INTRAMUSCULAR; INTRAVENOUS ONCE AS NEEDED
Status: CANCELLED | OUTPATIENT
Start: 2024-05-16

## 2024-05-16 RX ORDER — HEPARIN 100 UNIT/ML
500 SYRINGE INTRAVENOUS
Status: CANCELLED | OUTPATIENT
Start: 2024-05-16

## 2024-06-26 NOTE — PROGRESS NOTES
Subjective:      Patient ID: Stefano Mccoy is a 89 y.o. male.    Chief Complaint:  Here for my treatment.    History of Present Illness  Chief complaint: Metastatic colon cancer, MSI-H    Onc Hx:  7/2/20-7/9/20: injectafer x2  7/2/20-current: keytruda    HPI: 88 y/o M w/ PMHx of HTN, DVT postop after colectomy (was on Xarelto 11/2019-6/2020, Xarelto stopped due to GI bleed and anemia requiring frequent transfusions), VALENTIN due to GI blood loss, BPH first presented with hematochezia 10-11/2019. He had a colonoscopy with Dr Lima that showed a mass at 60cm, biopsy with poorly differentiated adenocarcinoma. On 11/26/19 he had a left colectomy with partial gastrectomy with Dr Liu, showing poorly differentiated adenocarcinoma invading gastric wall and visceral peritoneum and with 3/14 LNs+. Due to his advanced age, no chemotherapy was offered. 3/2020 he was noted to be anemic, given IV iron infusion and referred back to Dr Liu. He had imaging at [facility] showing widespread intra-abdominal metastatic disease. Referred to [facility] to establish care    He was given 2 doses of injectafer 7/2020  He was started on Keytruda for MSI-H metastatic colon cancer on 7/2/20    Interval history:     Today, 06/27/2024, patient denies any acute concerns today.  He reports tolerating his last cycle of treatment without any significant side effects.  He denies any change in his bowel habits, symptoms of abdominal pain, decreased appetite or weight loss.  He denies any new medications, ER or hospital visits.  TSH higher today than last appt, patient states that he may have forgotten some doses of his medication.    Labs:  6/27/2024 creatinine 1.26, albumin 3.7, calcium 10, CEA 2.8, TSH 22.7730, .98, hemoglobin 17, MCV 95, platelet count 379, ANC 6.18    5/15/2024 creatinine 1.12, albumin 3.3, calcium 8.9, CEA 2.3, TSH 1.9118, WBC 7.09, hemoglobin 15.3, MCV 94.9, platelet count 348, ANC 4.56    04/03/2024 creatinine 1.12, albumin 3.6,  calcium 9.8, CEA 2.8, TSH 12.9, cortisol 14.5, hemoglobin 16.3, MCV 95.9, WBC count 10.1, platelet count 379, ANC 6.76.  24 Cr 1.2, tsh 15.63, CEA pending, WBC 8.7, Hgb 16, , ANC 5.88   1/10/24:  Creatinine 1.3, Cortisol 15.1, TSH 25, and CEA pending, WBC 9.8 hgb 15.6, Hct 47.6, Plt 343,000.  2023:  Creatinine 1.13, Cortisol 12.9, TSH 26.36, and CEA 3.0, WBC 8.53 hgb 15.7, Hct 47.9, Plt 333,000.  10/11/2023:  Creatinine 1.11.  Cortisol 8.8 TSH 6.71 and CEA 2.1.  WBC 8.35 hgb 15.6, Hct 47.9, Plt 380,000  2023:  CBC WNL.  CMP WNL with creatinine 1.07.  Cortisol 12.2, TSH 4.33, and CEA 2.6  2023 CMP unremarkable, TSH 11.28, CBC unremarkable.  CEA pending.  Phosphorus 2.7.  2023 :  CMP dated 2023 is completely normal.  The TSH however is below 0.3 uIU/ml.  CBC shows hemoglobin of 15, white cell count 10,500 slightly elevated platelets 395, the differential is essentially normal.  April 10, 2023:  CMP dated 2023 is essentially normal except for a low albumin at 3.3 CBC is normal except for  abnormal indices , RBC and slight thrombocytosis  2023:  Creatinine 11.4, potassium 5.3, bicarb 20, sodium 131.  2023:  TSH 4.4, cortisol 13.6, CEA 2.2, creatinine 7.3, albumin 3.1, calcium 8.8, LFTs within normal limits, free T4 1.23, WBC count 10.2, hemoglobin 14.1, MCV 96.4, platelet count 3 1 7, ANC 7.82.  CEA 2.2      Imagin2024 CT chest abdomen pelvis with contrast no significant change.  No evidence of metastatic disease to the chest.  Chronic findings as noted above.  No significant change in the abdomen.  No evidence of residual or recurrent tumor.  No evidence of metastatic disease to the abdomen or pelvis.  Removal of Muhammad catheter with placement of suprapubic bladder catheter.  There was persistent inflammatory stranding along the superior aspect of the bladder with numerous tiny bladder diverticuli.  No evidence of extraluminal air or fluid to  suggest perforation.  Status post cholecystectomy.  Diffuse fatty infiltration of the liver.  Stable right renal cysts.  Other chronic findings as noted above.    8/15/2023:  CT of the chest with contrast:  Impression:  Mild fatty prominence along the right lateral chest wall felt to represent the abnormality seen on recent chest x-ray.  No evidence of any suspicious mass.    CT of the chest ,abdomen , and pelvis obtained on June 2, 2023, and compared  to  the November 08/2022, reveals no evidence of metastatic disease in the lung.  There are in the abdomen various nodes that are borderline in size.  They are also stable nodes in the retroperitoneum.  A ventral hernia is seen and, he has multiple stones and diverticuli in the bladder.    12/19/2022:  Ultrasound thyroid.  Thyroid tissue not clearly identified, although thyroid gland may be diffusely heterogenous, although with no gross focal thyroid nodule identified.  11/08/2022 CT chest abdomen pelvis with contrast:  No evidence of metastatic disease.  No significant change since prior study in the chest or abdomen.    04/26/2022 CT chest abdomen pelvis with contrast:  No evidence of metastatic disease or other significant change since previous CT dated 11/30/2021.      11/30/21 CT C/A/P: stable chest CT with no evidence of metastatic disease. No evidence of metastatic disease or other significant changes in abdomen/pelvis since prior CT    7/16/21 CT C/A/P w/ IV contrast: no evidence of metastatic disease in chest. No evidence of neoplastic disease within abd or pelvis. Fatty liver has worsened. Prostate enlargement. Multiple bladder calculi. Distal colonic diverticulosis. Atherosclerosis.    3/16/21 CT C/A/P: No significant changes and no evidence of metastatic disease in the chest. Decrease in the size of the small soft tissue density nodules in the left upper quadrant. No other significant changes.    11/30/20 CT C/A/P w/ IV contrast: no evidence of thoracic  metastatic disease. Left upper quadrant abd wall thickening has decreased slightly. Small area of soft tissue density seen in left upper quadrant adjacent to colon. Measures minimally smaller than on prior exam. Additional area of soft tissue density adjacent to stomach and pancreatic tail has not changed. Overall, several small soft tissue masses remain with left abdomen and left abdominal wall, few of those minimally decreased in size from prior scan.    9/18/20 CT C/A/P w/ IV contrast: no evidence of thoracic metastatic disease. Significant decrease in size of previously described masses within abd wall on the left. Residual mild localized soft tissue thickening of upper anterior abd wall to the left of midline adjacent to left lobe of liver at site of previously demonstrated mass. Residual oval shaped soft tissue mass more caudally on the left [id number] suspicious for residual tumor. Also residual mass in left upper quadrant between gastric body and tAil of pancreas that has also decreased in size and remains contiguous with gastric wall. Omental mass decreased to 3cm.    6/9/20 CT C/A/P w/ IV contrast: no significant cardiopulmonary abnormality. Subtle nodularity in right lobe of thyroid lobe. Liver unremarkable. Several solid lesions involving the abdominal wall. 3.9cm lesion in upper abd wall to left of midline. 4.9cm lesion at more caudal level. Several solid lesions are seen within omental fat of left upper quadrant. Portion of this appears to be attached to stomach.    4/9/20 RLE venous duplex: occlusive DVT right popliteal vein.    11/15/19 CT A/P w/ IV contrast: colonic mass at splenic flexure 7.6x4.3cm, contiguous with the stomach. Numerous diverticula in descending and sigmoid colon    Path:  11/26/19 left colectomy with partial gastrectomy: tumor site splenic flexure, greatest dimension 8.5cm, no perforation, adenocarcinoma, mod to poorly differentiated, tumor invades visceral peritoneum and directly  invades gastric wall. Margins neg. +LVI, +PNI, no tumor deposits. 3/14 LNs+.  [id number]    11/15/19 colon 60cm biopsy: poorly differentiated adenocarcinoma.  MLH1 loss of expression. MSH2 and MSH6 no loss of expression, PMS2 loss of expression. MSI-H both by MMR IHC and MSI PCR       Past Medical History:   Diagnosis Date    BPH (benign prostatic hyperplasia)     Cancer     Colon cancer     DVT (deep venous thrombosis)     Hypertension     Suprapubic catheter 12/06/2023       Past Surgical History:   Procedure Laterality Date    ADENOIDECTOMY      BLADDER STONE REMOVAL  08/17/2023    CHOLECYSTECTOMY      COLECTOMY      EYE SURGERY      TONSILLECTOMY         Family History   Problem Relation Name Age of Onset    Lung cancer Mother         Social History     Socioeconomic History    Marital status:    Tobacco Use    Smoking status: Former    Smokeless tobacco: Never   Substance and Sexual Activity    Alcohol use: Not Currently    Drug use: Never    Sexual activity: Not Currently       Current Outpatient Medications   Medication Sig Dispense Refill    cephALEXin (KEFLEX) 250 MG capsule Take 250 mg by mouth every evening.      finasteride (PROSCAR) 5 mg tablet Take 5 mg by mouth once daily.      levothyroxine (SYNTHROID) 300 MCG Tab Take 0.3 mg by mouth before breakfast.      levothyroxine (SYNTHROID) 300 MCG Tab Take 0.3 mg by mouth before breakfast.      oxyCODONE-acetaminophen (PERCOCET)  mg per tablet Take 1 tablet by mouth as needed.      tamsulosin (FLOMAX) 0.4 mg Cap Take 0.4 mg by mouth once daily.       No current facility-administered medications for this visit.       Review of patient's allergies indicates:  No Known Allergies      Review of Systems:  CONSTITUTIONAL: no fevers, no chills, no weight loss, + chronic fatigue.   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  no tinnitus or hearing loss, no nose bleeding, no dysphagia, no odynophagia, no dental pain.   CVS: no chest pain, no palpitations, no dyspnea on exertion  RESP: no shortness of breath, no hemoptysis, no cough  GI: no nausea, no vomiting, no diarrhea, no constipation, no melena, no hematochezia, no hematemesis, no abdominal pain, no increase in abdominal girth  : Indwelling suprapubic Catheter, no dysuria, no hematuria, no hesitancy, no scrotal swelling, no discharge  INTEGUMENT: no rashes, no abnormal bruising, no nail pitting, no hyperpigmentation  NEURO: no falls, no memory loss, no paresthesias or dysesthesias, no urofecal incontinence or retention, no loss of strength on any extremity  MSK: no back pain, no new joint pain, no joint swelling, no muscle weakness  PSYCH: no suicidal or homicidal ideation, no depression, no insomnia, no anhedonia  ENDOCRINE: no heat or cold intolerance, no polyuria, no polydipsia    Objective:     Physical Exam:     GA: AAOx3, very pleasant elderly gentleman.  Appears in no acute distress.    HEENT: NCAT, PERRL, EOMI, left eye ectropion, color blind Oropharynx moist, fair dentition. no oral ulcers.  LYMPH: no cervical, axillary or supraclavicular adenopathy  CVS: s1s2 RRR, no M/R/G  RESP: Lung sounds clear to auscultation. No wheezing or other adventitious sounds.   ABD: soft, NT, ND, BS+, no hepatosplenomegaly. . Soft and non-tender, no pressure.   EXT: no deformities, no edema.   SKIN:  Warm, dry. No rashes or bruising. No jaundice.   NEURO: normal mentation, strength 5/5 on all 4 extremities, no sensory deficits   Suprapubic catheter in place      Assessment:   1. Colon cancer of descending colon and splenic flexure C18.6 with extensive intra-abdominal disease a nodes and mesentery  Splenic flexure, poorly differentiated adenocarcinoma, +LVI, +PNI, invasion of visceral peritoneum and gastric wall, 3/14 LNs+, diagnosed 11/2019 and s/p resection  Due to advanced  age he was not offered chemotherapy at that time  Recurrence imaging confirmed 6/2020  Of note, MSI-H from initial biopsy  FDA approval for keytruda for MSI-H frontline, discussed options of keytruda vs chemo and pt agreed to proceed with keytruda.  Keytruda started on 7/2/20. Changed to q6week on 12/23/20  CT C/A/P on 7/16/21 and 11/2021 essentially ERIC.  CT chest abdomen pelvis in April 2022 with ERIC  Previously discussed with patient and daughter the options to continue close surveillance versus restart treatment with pembrolizumab given patient's long history of ERIC and almost 2 years of treatment with pembrolizumab.  Patient and daughter would like to continue pembrolizumab with plans to decrease dose or increase the dosing interval if he were to have recurrent side effects from treatment.  Will plan to obtain imaging q4-6 months or as clinically indicated.       Plan:   6/27/24  Labs reviewed and discussed with patient ok to proceed with Keytruda today.  Continue Levothyroxine 225 mcg PO QD  Patient to follow-up in 6 weeks repeat labs including TSH, CEA, and treatment.    Renee COWARTP-C

## 2024-06-27 ENCOUNTER — OFFICE VISIT (OUTPATIENT)
Dept: HEMATOLOGY/ONCOLOGY | Facility: CLINIC | Age: 89
End: 2024-06-27
Payer: MEDICARE

## 2024-06-27 VITALS
BODY MASS INDEX: 41.56 KG/M2 | RESPIRATION RATE: 18 BRPM | WEIGHT: 264.81 LBS | HEIGHT: 67 IN | TEMPERATURE: 98 F | SYSTOLIC BLOOD PRESSURE: 138 MMHG | DIASTOLIC BLOOD PRESSURE: 86 MMHG | OXYGEN SATURATION: 94 % | HEART RATE: 62 BPM

## 2024-06-27 DIAGNOSIS — E04.1 THYROID NODULE: ICD-10-CM

## 2024-06-27 DIAGNOSIS — C18.9 COLON CANCER METASTASIZED TO MULTIPLE SITES: Primary | ICD-10-CM

## 2024-06-27 DIAGNOSIS — E03.9 HYPOTHYROIDISM, UNSPECIFIED TYPE: ICD-10-CM

## 2024-06-27 DIAGNOSIS — Z51.12 ENCOUNTER FOR ANTINEOPLASTIC IMMUNOTHERAPY: ICD-10-CM

## 2024-06-27 RX ORDER — HEPARIN 100 UNIT/ML
500 SYRINGE INTRAVENOUS
Status: CANCELLED | OUTPATIENT
Start: 2024-06-27

## 2024-06-27 RX ORDER — SODIUM CHLORIDE 0.9 % (FLUSH) 0.9 %
10 SYRINGE (ML) INJECTION
Status: CANCELLED | OUTPATIENT
Start: 2024-06-27

## 2024-06-27 RX ORDER — DOXYCYCLINE 100 MG/1
100 CAPSULE ORAL
COMMUNITY
Start: 2024-05-02

## 2024-06-27 RX ORDER — DIPHENHYDRAMINE HYDROCHLORIDE 50 MG/ML
50 INJECTION INTRAMUSCULAR; INTRAVENOUS ONCE AS NEEDED
Status: CANCELLED | OUTPATIENT
Start: 2024-06-27

## 2024-06-27 RX ORDER — EPINEPHRINE 0.3 MG/.3ML
0.3 INJECTION SUBCUTANEOUS ONCE AS NEEDED
Status: CANCELLED | OUTPATIENT
Start: 2024-06-27

## 2024-06-27 RX ORDER — LEVOTHYROXINE SODIUM 200 UG/1
200 TABLET ORAL
COMMUNITY
Start: 2024-06-09

## 2024-08-08 ENCOUNTER — OFFICE VISIT (OUTPATIENT)
Dept: HEMATOLOGY/ONCOLOGY | Facility: CLINIC | Age: 89
End: 2024-08-08
Payer: MEDICARE

## 2024-08-08 VITALS
HEIGHT: 67 IN | RESPIRATION RATE: 20 BRPM | SYSTOLIC BLOOD PRESSURE: 122 MMHG | OXYGEN SATURATION: 94 % | WEIGHT: 263 LBS | BODY MASS INDEX: 41.28 KG/M2 | TEMPERATURE: 98 F | HEART RATE: 76 BPM | DIASTOLIC BLOOD PRESSURE: 74 MMHG

## 2024-08-08 DIAGNOSIS — Z51.12 ENCOUNTER FOR ANTINEOPLASTIC IMMUNOTHERAPY: ICD-10-CM

## 2024-08-08 DIAGNOSIS — E03.9 HYPOTHYROIDISM, UNSPECIFIED TYPE: ICD-10-CM

## 2024-08-08 DIAGNOSIS — C18.9 COLON CANCER METASTASIZED TO MULTIPLE SITES: Primary | ICD-10-CM

## 2024-08-08 DIAGNOSIS — E04.1 THYROID NODULE: ICD-10-CM

## 2024-08-08 RX ORDER — DIPHENHYDRAMINE HYDROCHLORIDE 50 MG/ML
50 INJECTION INTRAMUSCULAR; INTRAVENOUS ONCE AS NEEDED
Status: CANCELLED | OUTPATIENT
Start: 2024-08-08

## 2024-08-08 RX ORDER — EPINEPHRINE 0.3 MG/.3ML
0.3 INJECTION SUBCUTANEOUS ONCE AS NEEDED
Status: CANCELLED | OUTPATIENT
Start: 2024-08-08

## 2024-08-08 RX ORDER — HEPARIN 100 UNIT/ML
500 SYRINGE INTRAVENOUS
Status: CANCELLED | OUTPATIENT
Start: 2024-08-08

## 2024-08-08 RX ORDER — SODIUM CHLORIDE 0.9 % (FLUSH) 0.9 %
10 SYRINGE (ML) INJECTION
Status: CANCELLED | OUTPATIENT
Start: 2024-08-08

## 2024-09-04 DIAGNOSIS — C18.9 COLON CANCER METASTASIZED TO MULTIPLE SITES: Primary | ICD-10-CM

## 2024-09-19 ENCOUNTER — OFFICE VISIT (OUTPATIENT)
Dept: HEMATOLOGY/ONCOLOGY | Facility: CLINIC | Age: 89
End: 2024-09-19
Payer: MEDICARE

## 2024-09-19 VITALS
SYSTOLIC BLOOD PRESSURE: 146 MMHG | DIASTOLIC BLOOD PRESSURE: 96 MMHG | RESPIRATION RATE: 18 BRPM | OXYGEN SATURATION: 95 % | TEMPERATURE: 99 F | HEART RATE: 68 BPM | HEIGHT: 67 IN | WEIGHT: 266.19 LBS | BODY MASS INDEX: 41.78 KG/M2

## 2024-09-19 DIAGNOSIS — N40.0 ENLARGED PROSTATE: ICD-10-CM

## 2024-09-19 DIAGNOSIS — C18.9 COLON CANCER METASTASIZED TO MULTIPLE SITES: Primary | ICD-10-CM

## 2024-09-19 DIAGNOSIS — E03.9 HYPOTHYROIDISM, UNSPECIFIED TYPE: ICD-10-CM

## 2024-09-19 RX ORDER — DIPHENHYDRAMINE HYDROCHLORIDE 50 MG/ML
50 INJECTION INTRAMUSCULAR; INTRAVENOUS ONCE AS NEEDED
Status: CANCELLED | OUTPATIENT
Start: 2024-09-19

## 2024-09-19 RX ORDER — SODIUM CHLORIDE 0.9 % (FLUSH) 0.9 %
10 SYRINGE (ML) INJECTION
Status: CANCELLED | OUTPATIENT
Start: 2024-09-19

## 2024-09-19 RX ORDER — HEPARIN 100 UNIT/ML
500 SYRINGE INTRAVENOUS
Status: CANCELLED | OUTPATIENT
Start: 2024-09-19

## 2024-09-19 RX ORDER — EPINEPHRINE 0.3 MG/.3ML
0.3 INJECTION SUBCUTANEOUS ONCE AS NEEDED
Status: CANCELLED | OUTPATIENT
Start: 2024-09-19

## 2024-09-19 NOTE — PROGRESS NOTES
Subjective:      Patient ID: Stefano Mccoy is a 89 y.o. male.    Chief Complaint:  Here for my treatment.    History of Present Illness  Chief complaint: Metastatic colon cancer, MSI-H    Onc Hx:  7/2/20-7/9/20: injectafer x2  7/2/20-current: keytruda    HPI: 88 y/o M w/ PMHx of HTN, DVT postop after colectomy (was on Xarelto 11/2019-6/2020, Xarelto stopped due to GI bleed and anemia requiring frequent transfusions), VALENTIN due to GI blood loss, BPH first presented with hematochezia 10-11/2019. He had a colonoscopy with Dr Lima that showed a mass at 60cm, biopsy with poorly differentiated adenocarcinoma. On 11/26/19 he had a left colectomy with partial gastrectomy with Dr Liu, showing poorly differentiated adenocarcinoma invading gastric wall and visceral peritoneum and with 3/14 LNs+. Due to his advanced age, no chemotherapy was offered. 3/2020 he was noted to be anemic, given IV iron infusion and referred back to Dr Liu. He had imaging at [facility] showing widespread intra-abdominal metastatic disease. Referred to [facility] to establish care    He was given 2 doses of injectafer 7/2020  He was started on Keytruda for MSI-H metastatic colon cancer on 7/2/20    Interval history:     Today, 09/19/2024, Patient denies any acute concerns today. he reports tolerating his treatment well without significant side effects.  He denies any new symptoms of pain, decreased appetite or weight loss.  He denies any blood in his stools or dark tarry stools.    Labs:    09/18/2024:  Creatinine 1.3, albumin 3.1, LFTs unremarkable, TSH 6.5, WBC count 9.5, hemoglobin 15.9, MCV 97.4, platelet count 369, CEA 2.3, cortisol 10.3.    8/7/2024 creatinine 1.4, albumin 3.3, calcium 9.2, CEA 2.8, TSH 4.44, WBC 9.0, hemoglobin 16.6, MCV 96.4, platelet count 340, ANC 6.27    6/27/2024 creatinine 1.26, albumin 3.7, calcium 10, CEA 2.8, TSH 22.7730, WBC 9.98, hemoglobin 17, MCV 95, platelet count 379, ANC 6.18    5/15/2024 creatinine 1.12, albumin  3.3, calcium 8.9, CEA 2.3, TSH 1.9118, WBC 7.09, hemoglobin 15.3, MCV 94.9, platelet count 348, ANC 4.56    2024 creatinine 1.12, albumin 3.6, calcium 9.8, CEA 2.8, TSH 12.9, cortisol 14.5, hemoglobin 16.3, MCV 95.9, WBC count 10.1, platelet count 379, ANC 6.76.  24 Cr 1.2, tsh 15.63, CEA pending, WBC 8.7, Hgb 16, , ANC 5.88   1/10/24:  Creatinine 1.3, Cortisol 15.1, TSH 25, and CEA pending, WBC 9.8 hgb 15.6, Hct 47.6, Plt 343,000.  2023:  Creatinine 1.13, Cortisol 12.9, TSH 26.36, and CEA 3.0, WBC 8.53 hgb 15.7, Hct 47.9, Plt 333,000.  10/11/2023:  Creatinine 1.11.  Cortisol 8.8 TSH 6.71 and CEA 2.1.  WBC 8.35 hgb 15.6, Hct 47.9, Plt 380,000  2023:  CBC WNL.  CMP WNL with creatinine 1.07.  Cortisol 12.2, TSH 4.33, and CEA 2.6  2023 CMP unremarkable, TSH 11.28, CBC unremarkable.  CEA pending.  Phosphorus 2.7.  2023 :  CMP dated 2023 is completely normal.  The TSH however is below 0.3 uIU/ml.  CBC shows hemoglobin of 15, white cell count 10,500 slightly elevated platelets 395, the differential is essentially normal.  April 10, 2023:  CMP dated 2023 is essentially normal except for a low albumin at 3.3 CBC is normal except for  abnormal indices , RBC and slight thrombocytosis  2023:  Creatinine 11.4, potassium 5.3, bicarb 20, sodium 131.  2023:  TSH 4.4, cortisol 13.6, CEA 2.2, creatinine 7.3, albumin 3.1, calcium 8.8, LFTs within normal limits, free T4 1.23, WBC count 10.2, hemoglobin 14.1, MCV 96.4, platelet count 3 1 7, ANC 7.82.  CEA 2.2      Imagin2024 CT chest abdomen pelvis with contrast:  Prostatomegaly, heterogeneous enhancement is also present in the prostate gland which may be benign however correlation PSA to exclude tumor is recommended platelet bladder calculi again noted identified as well as irregularity of the bladder wall, bladder cavity lined primary vesicular fat stranding suggestive of cystitis, no evidence of  metastatic disease.    02/29/2024 CT chest abdomen pelvis with contrast no significant change.  No evidence of metastatic disease to the chest.  Chronic findings as noted above.  No significant change in the abdomen.  No evidence of residual or recurrent tumor.  No evidence of metastatic disease to the abdomen or pelvis.  Removal of Muhammad catheter with placement of suprapubic bladder catheter.  There was persistent inflammatory stranding along the superior aspect of the bladder with numerous tiny bladder diverticuli.  No evidence of extraluminal air or fluid to suggest perforation.  Status post cholecystectomy.  Diffuse fatty infiltration of the liver.  Stable right renal cysts.  Other chronic findings as noted above.    8/15/2023:  CT of the chest with contrast:  Impression:  Mild fatty prominence along the right lateral chest wall felt to represent the abnormality seen on recent chest x-ray.  No evidence of any suspicious mass.    CT of the chest ,abdomen , and pelvis obtained on June 2, 2023, and compared  to  the November 08/2022, reveals no evidence of metastatic disease in the lung.  There are in the abdomen various nodes that are borderline in size.  They are also stable nodes in the retroperitoneum.  A ventral hernia is seen and, he has multiple stones and diverticuli in the bladder.    12/19/2022:  Ultrasound thyroid.  Thyroid tissue not clearly identified, although thyroid gland may be diffusely heterogenous, although with no gross focal thyroid nodule identified.  11/08/2022 CT chest abdomen pelvis with contrast:  No evidence of metastatic disease.  No significant change since prior study in the chest or abdomen.    04/26/2022 CT chest abdomen pelvis with contrast:  No evidence of metastatic disease or other significant change since previous CT dated 11/30/2021.      11/30/21 CT C/A/P: stable chest CT with no evidence of metastatic disease. No evidence of metastatic disease or other significant changes in  abdomen/pelvis since prior CT    7/16/21 CT C/A/P w/ IV contrast: no evidence of metastatic disease in chest. No evidence of neoplastic disease within abd or pelvis. Fatty liver has worsened. Prostate enlargement. Multiple bladder calculi. Distal colonic diverticulosis. Atherosclerosis.    3/16/21 CT C/A/P: No significant changes and no evidence of metastatic disease in the chest. Decrease in the size of the small soft tissue density nodules in the left upper quadrant. No other significant changes.    11/30/20 CT C/A/P w/ IV contrast: no evidence of thoracic metastatic disease. Left upper quadrant abd wall thickening has decreased slightly. Small area of soft tissue density seen in left upper quadrant adjacent to colon. Measures minimally smaller than on prior exam. Additional area of soft tissue density adjacent to stomach and pancreatic tail has not changed. Overall, several small soft tissue masses remain with left abdomen and left abdominal wall, few of those minimally decreased in size from prior scan.    9/18/20 CT C/A/P w/ IV contrast: no evidence of thoracic metastatic disease. Significant decrease in size of previously described masses within abd wall on the left. Residual mild localized soft tissue thickening of upper anterior abd wall to the left of midline adjacent to left lobe of liver at site of previously demonstrated mass. Residual oval shaped soft tissue mass more caudally on the left [id number] suspicious for residual tumor. Also residual mass in left upper quadrant between gastric body and tAil of pancreas that has also decreased in size and remains contiguous with gastric wall. Omental mass decreased to 3cm.    6/9/20 CT C/A/P w/ IV contrast: no significant cardiopulmonary abnormality. Subtle nodularity in right lobe of thyroid lobe. Liver unremarkable. Several solid lesions involving the abdominal wall. 3.9cm lesion in upper abd wall to left of midline. 4.9cm lesion at more caudal level. Several  solid lesions are seen within omental fat of left upper quadrant. Portion of this appears to be attached to stomach.    4/9/20 RLE venous duplex: occlusive DVT right popliteal vein.    11/15/19 CT A/P w/ IV contrast: colonic mass at splenic flexure 7.6x4.3cm, contiguous with the stomach. Numerous diverticula in descending and sigmoid colon    Path:  11/26/19 left colectomy with partial gastrectomy: tumor site splenic flexure, greatest dimension 8.5cm, no perforation, adenocarcinoma, mod to poorly differentiated, tumor invades visceral peritoneum and directly invades gastric wall. Margins neg. +LVI, +PNI, no tumor deposits. 3/14 LNs+.  pT4N1b    11/15/19 colon 60cm biopsy: poorly differentiated adenocarcinoma.  MLH1 loss of expression. MSH2 and MSH6 no loss of expression, PMS2 loss of expression. MSI-H both by MMR IHC and MSI PCR       Past Medical History:   Diagnosis Date    BPH (benign prostatic hyperplasia)     Cancer     Colon cancer     DVT (deep venous thrombosis)     Hypertension     Suprapubic catheter 12/06/2023       Past Surgical History:   Procedure Laterality Date    ADENOIDECTOMY      BLADDER STONE REMOVAL  08/17/2023    CHOLECYSTECTOMY      COLECTOMY      EYE SURGERY      TONSILLECTOMY         Family History   Problem Relation Name Age of Onset    Lung cancer Mother         Social History     Socioeconomic History    Marital status:    Tobacco Use    Smoking status: Former    Smokeless tobacco: Never   Substance and Sexual Activity    Alcohol use: Not Currently    Drug use: Never    Sexual activity: Not Currently       Current Outpatient Medications   Medication Sig Dispense Refill    levothyroxine (SYNTHROID) 200 MCG tablet Take 200 mcg by mouth.       No current facility-administered medications for this visit.       Review of patient's allergies indicates:  No Known Allergies      Review of Systems:  CONSTITUTIONAL: no fevers, no chills, no weight loss, + chronic fatigue.   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  no tinnitus or hearing loss, no nose bleeding, no dysphagia, no odynophagia, no dental pain.   CVS: no chest pain, no palpitations, no dyspnea on exertion  RESP: no shortness of breath, no hemoptysis, no cough  GI: no nausea, no vomiting, no diarrhea, no constipation, no melena, no hematochezia, no hematemesis, no abdominal pain, no increase in abdominal girth  : Indwelling suprapubic Catheter, no dysuria, no hematuria, no hesitancy, no scrotal swelling, no discharge  INTEGUMENT: no rashes, no abnormal bruising, no nail pitting, no hyperpigmentation  NEURO: no falls, no memory loss, no paresthesias or dysesthesias, no urofecal incontinence or retention, no loss of strength on any extremity  MSK: no back pain, no new joint pain, no joint swelling, no muscle weakness  PSYCH: no suicidal or homicidal ideation, no depression, no insomnia, no anhedonia  ENDOCRINE: no heat or cold intolerance, no polyuria, no polydipsia    Objective:     Vitals:    09/19/24 1334   BP: (!) 146/96   Pulse: 68   Resp: 18   Temp: 98.6 °F (37 °C)          Physical Exam:  GA: AAOx3, NAD  HEENT:  moist oral mucous membranes  RESP:  Equal chest rise, no accessory muscle use  ABDOMEN:  Distended, soft, nontender, no guarding or rigidity  :  Indwelling Muhammad catheter  EXT: no deformities, no pedal edema  SKIN: no rashes, warm and dry  NEURO: normal mentation, no gross neuro deficits          Assessment:   1. Colon cancer of descending colon and splenic flexure C18.6 with extensive intra-abdominal disease a nodes and mesentery  Splenic flexure, poorly differentiated adenocarcinoma, +LVI, +PNI, invasion of visceral peritoneum and gastric wall, 3/14 LNs+, diagnosed 11/2019 and s/p resection  Due to advanced age he was not offered chemotherapy at that time  Recurrence imaging  confirmed 6/2020  Of note, MSI-H from initial biopsy  FDA approval for keytruda for MSI-H frontline, discussed options of keytruda vs chemo and pt agreed to proceed with keytruda.  Keytruda started on 7/2/20. Changed to q6week on 12/23/20  CT C/A/P on 7/16/21 and 11/2021 essentially ERIC.  CT chest abdomen pelvis in April 2022 with [facility]  Previously discussed with patient and daughter the options to continue close surveillance versus restart treatment with pembrolizumab given patient's long history of ERIC and almost 2 years of treatment with pembrolizumab.  Patient and daughter would like to continue pembrolizumab with plans to decrease dose or increase the dosing interval if he were to have recurrent side effects from treatment.  Will plan to obtain imaging q4-6 months or as clinically indicated.    CT chest abdomen pelvis with contrast in September 2024 with no evidence of metastatic.  Patient is established and follows with Urology for indwelling Muhammad and enlarged prostate     Plan:     Labs reviewed and discussed with patient ok to proceed with Keytruda today.  Continue Levothyroxine 225 mcg PO QD  Follow-up with urology as scheduled.  Patient to follow-up in 6 weeks repeat labs including CBC, CMP, TSH, FT4, CEA, and treatment.        Portions of the record may have been created with voice recognition software. Occasional wrong-word or sound-a-like substitutions may have occurred due to the inherent limitations of voice recognition software.

## 2024-10-07 DIAGNOSIS — E03.9 HYPOTHYROIDISM, UNSPECIFIED TYPE: ICD-10-CM

## 2024-10-07 DIAGNOSIS — C18.9 COLON CANCER METASTASIZED TO MULTIPLE SITES: Primary | ICD-10-CM

## 2024-10-07 RX ORDER — LEVOTHYROXINE SODIUM 200 UG/1
200 TABLET ORAL DAILY
Qty: 30 TABLET | Refills: 3 | Status: SHIPPED | OUTPATIENT
Start: 2024-10-07

## 2024-10-31 ENCOUNTER — OFFICE VISIT (OUTPATIENT)
Dept: HEMATOLOGY/ONCOLOGY | Facility: CLINIC | Age: 89
End: 2024-10-31
Payer: MEDICARE

## 2024-10-31 VITALS
RESPIRATION RATE: 14 BRPM | DIASTOLIC BLOOD PRESSURE: 89 MMHG | TEMPERATURE: 97 F | BODY MASS INDEX: 40.78 KG/M2 | HEART RATE: 87 BPM | WEIGHT: 259.81 LBS | HEIGHT: 67 IN | SYSTOLIC BLOOD PRESSURE: 142 MMHG | OXYGEN SATURATION: 94 %

## 2024-10-31 DIAGNOSIS — E06.4 DRUG-INDUCED THYROIDITIS: ICD-10-CM

## 2024-10-31 DIAGNOSIS — C18.9 COLON CANCER METASTASIZED TO MULTIPLE SITES: Primary | ICD-10-CM

## 2024-10-31 RX ORDER — EPINEPHRINE 0.3 MG/.3ML
0.3 INJECTION SUBCUTANEOUS ONCE AS NEEDED
Status: CANCELLED | OUTPATIENT
Start: 2024-10-31

## 2024-10-31 RX ORDER — HEPARIN 100 UNIT/ML
500 SYRINGE INTRAVENOUS
Status: CANCELLED | OUTPATIENT
Start: 2024-10-31

## 2024-10-31 RX ORDER — HYOSCYAMINE SULFATE 0.125 MG
125 TABLET ORAL
COMMUNITY
Start: 2024-10-28

## 2024-10-31 RX ORDER — SODIUM CHLORIDE 0.9 % (FLUSH) 0.9 %
10 SYRINGE (ML) INJECTION
Status: CANCELLED | OUTPATIENT
Start: 2024-10-31

## 2024-10-31 RX ORDER — DIPHENHYDRAMINE HYDROCHLORIDE 50 MG/ML
50 INJECTION INTRAMUSCULAR; INTRAVENOUS ONCE AS NEEDED
Status: CANCELLED | OUTPATIENT
Start: 2024-10-31

## 2024-10-31 RX ORDER — DOXYCYCLINE 100 MG/1
100 CAPSULE ORAL
COMMUNITY
Start: 2024-09-28

## 2024-12-12 ENCOUNTER — OFFICE VISIT (OUTPATIENT)
Dept: HEMATOLOGY/ONCOLOGY | Facility: CLINIC | Age: 89
End: 2024-12-12
Payer: MEDICARE

## 2024-12-12 VITALS
WEIGHT: 255.31 LBS | SYSTOLIC BLOOD PRESSURE: 127 MMHG | RESPIRATION RATE: 18 BRPM | HEIGHT: 67 IN | TEMPERATURE: 98 F | DIASTOLIC BLOOD PRESSURE: 89 MMHG | HEART RATE: 126 BPM | BODY MASS INDEX: 40.07 KG/M2 | OXYGEN SATURATION: 96 %

## 2024-12-12 DIAGNOSIS — E06.4 DRUG-INDUCED THYROIDITIS: ICD-10-CM

## 2024-12-12 DIAGNOSIS — C18.9 COLON CANCER METASTASIZED TO MULTIPLE SITES: Primary | ICD-10-CM

## 2024-12-12 RX ORDER — SODIUM CHLORIDE 0.9 % (FLUSH) 0.9 %
10 SYRINGE (ML) INJECTION
Status: CANCELLED | OUTPATIENT
Start: 2024-12-12

## 2024-12-12 RX ORDER — HEPARIN 100 UNIT/ML
500 SYRINGE INTRAVENOUS
Status: CANCELLED | OUTPATIENT
Start: 2024-12-12

## 2024-12-12 RX ORDER — EPINEPHRINE 0.3 MG/.3ML
0.3 INJECTION SUBCUTANEOUS ONCE AS NEEDED
Status: CANCELLED | OUTPATIENT
Start: 2024-12-12

## 2024-12-12 RX ORDER — DIPHENHYDRAMINE HYDROCHLORIDE 50 MG/ML
50 INJECTION INTRAMUSCULAR; INTRAVENOUS ONCE AS NEEDED
Status: CANCELLED | OUTPATIENT
Start: 2024-12-12

## 2024-12-12 NOTE — PROGRESS NOTES
Subjective:      Patient ID: Stefano Mccoy is a 89 y.o. male.    Chief Complaint:  Here for my treatment.    History of Present Illness  Chief complaint: Metastatic colon cancer, MSI-H    Onc Hx:  7/2/20-7/9/20: injectafer x2  7/2/20-current: keytruda    HPI: 86 y/o M w/ PMHx of HTN, DVT postop after colectomy (was on Xarelto 11/2019-6/2020, Xarelto stopped due to GI bleed and anemia requiring frequent transfusions), VALENTIN due to GI blood loss, BPH first presented with hematochezia 10-11/2019. He had a colonoscopy with Dr Lima that showed a mass at 60cm, biopsy with poorly differentiated adenocarcinoma. On 11/26/19 he had a left colectomy with partial gastrectomy with Dr Liu, showing poorly differentiated adenocarcinoma invading gastric wall and visceral peritoneum and with 3/14 LNs+. Due to his advanced age, no chemotherapy was offered. 3/2020 he was noted to be anemic, given IV iron infusion and referred back to Dr Liu. He had imaging at [facility] showing widespread intra-abdominal metastatic disease. Referred to [facility] to establish care    He was given 2 doses of injectafer 7/2020  He was started on Keytruda for MSI-H metastatic colon cancer on 7/2/20    Interval history:     Today, 12/12/2024, Patient denies any acute concerns today. He continues tolerating treatment well without significant side effects.  He denies any new symptoms of pain, decreased appetite or weight loss.  He denies any blood in his stools or dark tarry stools.    Labs:    12/11/24: CEA 2.8, CMP and CBC unremarkable, tsh 1.90, cortisol 1.9,   10/30/24: CMP unremarkable, TSH 3.09, cortisol 10.5T4 1.34, PSA 20.8, wbc 8.2, hgb 15.6, plt 311  09/18/2024:  Creatinine 1.3, albumin 3.1, LFTs unremarkable, TSH 6.5, WBC count 9.5, hemoglobin 15.9, MCV 97.4, platelet count 369, CEA 2.3, cortisol 10.3.    8/7/2024 creatinine 1.4, albumin 3.3, calcium 9.2, CEA 2.8, TSH 4.44, WBC 9.0, hemoglobin 16.6, MCV 96.4, platelet count 340, ANC 6.27    6/27/2024  creatinine 1.26, albumin 3.7, calcium 10, CEA 2.8, TSH 22.7730, WBC 9.98, hemoglobin 17, MCV 95, platelet count 379, ANC 6.18    5/15/2024 creatinine 1.12, albumin 3.3, calcium 8.9, CEA 2.3, TSH 1.9118, WBC 7.09, hemoglobin 15.3, MCV 94.9, platelet count 348, ANC 4.56    2024 creatinine 1.12, albumin 3.6, calcium 9.8, CEA 2.8, TSH 12.9, cortisol 14.5, hemoglobin 16.3, MCV 95.9, WBC count 10.1, platelet count 379, ANC 6.76.  24 Cr 1.2, tsh 15.63, CEA pending, WBC 8.7, Hgb 16, , ANC 5.88   1/10/24:  Creatinine 1.3, Cortisol 15.1, TSH 25, and CEA pending, WBC 9.8 hgb 15.6, Hct 47.6, Plt 343,000.  2023:  Creatinine 1.13, Cortisol 12.9, TSH 26.36, and CEA 3.0, WBC 8.53 hgb 15.7, Hct 47.9, Plt 333,000.  10/11/2023:  Creatinine 1.11.  Cortisol 8.8 TSH 6.71 and CEA 2.1.  WBC 8.35 hgb 15.6, Hct 47.9, Plt 380,000  2023:  CBC WNL.  CMP WNL with creatinine 1.07.  Cortisol 12.2, TSH 4.33, and CEA 2.6  2023 CMP unremarkable, TSH 11.28, CBC unremarkable.  CEA pending.  Phosphorus 2.7.  2023 :  CMP dated 2023 is completely normal.  The TSH however is below 0.3 uIU/ml.  CBC shows hemoglobin of 15, white cell count 10,500 slightly elevated platelets 395, the differential is essentially normal.  April 10, 2023:  CMP dated 2023 is essentially normal except for a low albumin at 3.3 CBC is normal except for  abnormal indices , RBC and slight thrombocytosis  2023:  Creatinine 11.4, potassium 5.3, bicarb 20, sodium 131.  2023:  TSH 4.4, cortisol 13.6, CEA 2.2, creatinine 7.3, albumin 3.1, calcium 8.8, LFTs within normal limits, free T4 1.23, WBC count 10.2, hemoglobin 14.1, MCV 96.4, platelet count 3 1 7, ANC 7.82.  CEA 2.2      Imagin2024 CT chest abdomen pelvis with contrast:  Prostatomegaly, heterogeneous enhancement is also present in the prostate gland which may be benign however correlation PSA to exclude tumor is recommended platelet bladder calculi again  noted identified as well as irregularity of the bladder wall, bladder cavity lined primary vesicular fat stranding suggestive of cystitis, no evidence of metastatic disease.    02/29/2024 CT chest abdomen pelvis with contrast no significant change.  No evidence of metastatic disease to the chest.  Chronic findings as noted above.  No significant change in the abdomen.  No evidence of residual or recurrent tumor.  No evidence of metastatic disease to the abdomen or pelvis.  Removal of Muhammad catheter with placement of suprapubic bladder catheter.  There was persistent inflammatory stranding along the superior aspect of the bladder with numerous tiny bladder diverticuli.  No evidence of extraluminal air or fluid to suggest perforation.  Status post cholecystectomy.  Diffuse fatty infiltration of the liver.  Stable right renal cysts.  Other chronic findings as noted above.    8/15/2023:  CT of the chest with contrast:  Impression:  Mild fatty prominence along the right lateral chest wall felt to represent the abnormality seen on recent chest x-ray.  No evidence of any suspicious mass.    CT of the chest ,abdomen , and pelvis obtained on June 2, 2023, and compared  to  the November 08/2022, reveals no evidence of metastatic disease in the lung.  There are in the abdomen various nodes that are borderline in size.  They are also stable nodes in the retroperitoneum.  A ventral hernia is seen and, he has multiple stones and diverticuli in the bladder.    12/19/2022:  Ultrasound thyroid.  Thyroid tissue not clearly identified, although thyroid gland may be diffusely heterogenous, although with no gross focal thyroid nodule identified.  11/08/2022 CT chest abdomen pelvis with contrast:  No evidence of metastatic disease.  No significant change since prior study in the chest or abdomen.    04/26/2022 CT chest abdomen pelvis with contrast:  No evidence of metastatic disease or other significant change since previous CT dated  11/30/2021.      11/30/21 CT C/A/P: stable chest CT with no evidence of metastatic disease. No evidence of metastatic disease or other significant changes in abdomen/pelvis since prior CT    7/16/21 CT C/A/P w/ IV contrast: no evidence of metastatic disease in chest. No evidence of neoplastic disease within abd or pelvis. Fatty liver has worsened. Prostate enlargement. Multiple bladder calculi. Distal colonic diverticulosis. Atherosclerosis.    3/16/21 CT C/A/P: No significant changes and no evidence of metastatic disease in the chest. Decrease in the size of the small soft tissue density nodules in the left upper quadrant. No other significant changes.    11/30/20 CT C/A/P w/ IV contrast: no evidence of thoracic metastatic disease. Left upper quadrant abd wall thickening has decreased slightly. Small area of soft tissue density seen in left upper quadrant adjacent to colon. Measures minimally smaller than on prior exam. Additional area of soft tissue density adjacent to stomach and pancreatic tail has not changed. Overall, several small soft tissue masses remain with left abdomen and left abdominal wall, few of those minimally decreased in size from prior scan.    9/18/20 CT C/A/P w/ IV contrast: no evidence of thoracic metastatic disease. Significant decrease in size of previously described masses within abd wall on the left. Residual mild localized soft tissue thickening of upper anterior abd wall to the left of midline adjacent to left lobe of liver at site of previously demonstrated mass. Residual oval shaped soft tissue mass more caudally on the left [id number] suspicious for residual tumor. Also residual mass in left upper quadrant between gastric body and tAil of pancreas that has also decreased in size and remains contiguous with gastric wall. Omental mass decreased to 3cm.    6/9/20 CT C/A/P w/ IV contrast: no significant cardiopulmonary abnormality. Subtle nodularity in right lobe of thyroid lobe. Liver  unremarkable. Several solid lesions involving the abdominal wall. 3.9cm lesion in upper abd wall to left of midline. 4.9cm lesion at more caudal level. Several solid lesions are seen within omental fat of left upper quadrant. Portion of this appears to be attached to stomach.    4/9/20 RLE venous duplex: occlusive DVT right popliteal vein.    11/15/19 CT A/P w/ IV contrast: colonic mass at splenic flexure 7.6x4.3cm, contiguous with the stomach. Numerous diverticula in descending and sigmoid colon    Path:  11/26/19 left colectomy with partial gastrectomy: tumor site splenic flexure, greatest dimension 8.5cm, no perforation, adenocarcinoma, mod to poorly differentiated, tumor invades visceral peritoneum and directly invades gastric wall. Margins neg. +LVI, +PNI, no tumor deposits. 3/14 LNs+.  [id number]    11/15/19 colon 60cm biopsy: poorly differentiated adenocarcinoma.  MLH1 loss of expression. MSH2 and MSH6 no loss of expression, PMS2 loss of expression. MSI-H both by MMR IHC and MSI PCR       Past Medical History:   Diagnosis Date    BPH (benign prostatic hyperplasia)     Cancer     Colon cancer     DVT (deep venous thrombosis)     Hypertension     Suprapubic catheter 12/06/2023       Past Surgical History:   Procedure Laterality Date    ADENOIDECTOMY      BLADDER STONE REMOVAL  08/17/2023    CHOLECYSTECTOMY      COLECTOMY      EYE SURGERY      TONSILLECTOMY         Family History   Problem Relation Name Age of Onset    Lung cancer Mother         Social History     Socioeconomic History    Marital status:    Tobacco Use    Smoking status: Former    Smokeless tobacco: Never   Substance and Sexual Activity    Alcohol use: Not Currently    Drug use: Never    Sexual activity: Not Currently       Current Outpatient Medications   Medication Sig Dispense Refill    doxycycline (MONODOX) 100 MG capsule Take 100 mg by mouth.      levothyroxine (SYNTHROID) 200 MCG tablet Take 1 tablet (200 mcg total) by mouth once  daily. 30 tablet 3    hyoscyamine (ANASPAZ,LEVSIN) 0.125 mg Tab Take 125 mcg by mouth. (Patient not taking: Reported on 12/12/2024)       No current facility-administered medications for this visit.       Review of patient's allergies indicates:  No Known Allergies      Review of Systems:  CONSTITUTIONAL: no fevers, no chills, no weight loss, + chronic fatigue.   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  no tinnitus or hearing loss, no nose bleeding, no dysphagia, no odynophagia, no dental pain.   CVS: no chest pain, no palpitations, no dyspnea on exertion  RESP: no shortness of breath, no hemoptysis, no cough  GI: no nausea, no vomiting, no diarrhea, no constipation, no melena, no hematochezia, no hematemesis, no abdominal pain, no increase in abdominal girth  : Indwelling suprapubic Catheter, no dysuria, no hematuria, no hesitancy, no scrotal swelling, no discharge  INTEGUMENT: no rashes, no abnormal bruising, no nail pitting, no hyperpigmentation  NEURO: no falls, no memory loss, no paresthesias or dysesthesias, no urofecal incontinence or retention, no loss of strength on any extremity  MSK: no back pain, no new joint pain, no joint swelling, no muscle weakness  PSYCH: no suicidal or homicidal ideation, no depression, no insomnia, no anhedonia  ENDOCRINE: no heat or cold intolerance, no polyuria, no polydipsia    Objective:     Vitals:    12/12/24 1030   BP: 127/89   Pulse: (!) 126   Resp: 18   Temp: 97.5 °F (36.4 °C)            Physical Exam:  GA: AAOx3, NAD  HEENT:  moist oral mucous membranes  RESP:  Equal chest rise, no accessory muscle use  ABDOMEN:  Distended, soft, nontender, no guarding or rigidity  :  Indwelling Muhammad catheter  EXT: no deformities, no pedal edema  SKIN: no rashes, warm and dry  NEURO: normal mentation, no gross neuro  deficits          Assessment:   1. 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  Keytruda started on 7/2/20. Changed to q6week on 12/23/20  CT C/A/P on 7/16/21 and 11/2021 essentially ERIC.  CT chest abdomen pelvis in April 2022 with ERIC  Previously discussed with patient and daughter the options to continue close surveillance versus restart treatment with pembrolizumab given patient's long history of ERIC and almost 2 years of treatment with pembrolizumab.  Patient and daughter would like to continue pembrolizumab with plans to decrease dose or increase the dosing interval if he were to have recurrent side effects from treatment.  Will plan to obtain imaging q4-6 months or as clinically indicated.    CT chest abdomen pelvis with contrast in September 2024 with no evidence of metastatic.  Patient is established and follows with Urology for indwelling Muhammad and enlarged prostate     Plan:     Labs reviewed and ok to proceed with Keytruda today.  Continue Levothyroxine 225 mcg PO QD  Follow-up with urology as scheduled.  Patient to follow-up in 6 weeks repeat labs including CBC, CMP, TSH, FT4, CEA, and treatment.

## 2025-02-03 NOTE — PROGRESS NOTES
Subjective:      Patient ID: Stefano Mccoy is a 89 y.o. male.    Chief Complaint:  Here for my treatment.    History of Present Illness  Chief complaint: Metastatic colon cancer, MSI-H    Onc Hx:  7/2/20-7/9/20: injectafer x2  7/2/20-current: keytruda    HPI: 88 y/o M w/ PMHx of HTN, DVT postop after colectomy (was on Xarelto 11/2019-6/2020, Xarelto stopped due to GI bleed and anemia requiring frequent transfusions), VALENTIN due to GI blood loss, BPH first presented with hematochezia 10-11/2019. He had a colonoscopy with Dr Lima that showed a mass at 60cm, biopsy with poorly differentiated adenocarcinoma. On 11/26/19 he had a left colectomy with partial gastrectomy with Dr Liu, showing poorly differentiated adenocarcinoma invading gastric wall and visceral peritoneum and with 3/14 LNs+. Due to his advanced age, no chemotherapy was offered. 3/2020 he was noted to be anemic, given IV iron infusion and referred back to Dr Liu. He had imaging at [facility] showing widespread intra-abdominal metastatic disease. Referred to [facility] to establish care    He was given 2 doses of injectafer 7/2020  He was started on Keytruda for MSI-H metastatic colon cancer on 7/2/20    Interval history:     Today, 2/4/25, Patient denies any acute concerns today. He continues tolerating treatment well without significant side effects.  He denies any new symptoms of pain, decreased appetite or weight loss.  He denies any blood in his stools or dark tarry stools.    Labs:  2/3/25: CEA pending, CMP and CBC unremarkable, tsh 1.55, cortisol pending     12/11/24: CEA 2.8, CMP and CBC unremarkable, tsh 1.90, cortisol 1.9,   10/30/24: CMP unremarkable, TSH 3.09, cortisol 10.5T4 1.34, PSA 20.8, wbc 8.2, hgb 15.6, plt 311  09/18/2024:  Creatinine 1.3, albumin 3.1, LFTs unremarkable, TSH 6.5, WBC count 9.5, hemoglobin 15.9, MCV 97.4, platelet count 369, CEA 2.3, cortisol 10.3.    8/7/2024 creatinine 1.4, albumin 3.3, calcium 9.2, CEA 2.8, TSH 4.44, WBC  9.0, hemoglobin 16.6, MCV 96.4, platelet count 340, ANC 6.27    2024 creatinine 1.26, albumin 3.7, calcium 10, CEA 2.8, TSH 22.7730, WBC 9.98, hemoglobin 17, MCV 95, platelet count 379, ANC 6.18    5/15/2024 creatinine 1.12, albumin 3.3, calcium 8.9, CEA 2.3, TSH 1.9118, WBC 7.09, hemoglobin 15.3, MCV 94.9, platelet count 348, ANC 4.56    2024 creatinine 1.12, albumin 3.6, calcium 9.8, CEA 2.8, TSH 12.9, cortisol 14.5, hemoglobin 16.3, MCV 95.9, WBC count 10.1, platelet count 379, ANC 6.76.  24 Cr 1.2, tsh 15.63, CEA pending, WBC 8.7, Hgb 16, , ANC 5.88   1/10/24:  Creatinine 1.3, Cortisol 15.1, TSH 25, and CEA pending, WBC 9.8 hgb 15.6, Hct 47.6, Plt 343,000.  2023:  Creatinine 1.13, Cortisol 12.9, TSH 26.36, and CEA 3.0, WBC 8.53 hgb 15.7, Hct 47.9, Plt 333,000.  10/11/2023:  Creatinine 1.11.  Cortisol 8.8 TSH 6.71 and CEA 2.1.  WBC 8.35 hgb 15.6, Hct 47.9, Plt 380,000  2023:  CBC WNL.  CMP WNL with creatinine 1.07.  Cortisol 12.2, TSH 4.33, and CEA 2.6  2023 CMP unremarkable, TSH 11.28, CBC unremarkable.  CEA pending.  Phosphorus 2.7.  2023 :  CMP dated 2023 is completely normal.  The TSH however is below 0.3 uIU/ml.  CBC shows hemoglobin of 15, white cell count 10,500 slightly elevated platelets 395, the differential is essentially normal.  April 10, 2023:  CMP dated 2023 is essentially normal except for a low albumin at 3.3 CBC is normal except for  abnormal indices , RBC and slight thrombocytosis  2023:  Creatinine 11.4, potassium 5.3, bicarb 20, sodium 131.  2023:  TSH 4.4, cortisol 13.6, CEA 2.2, creatinine 7.3, albumin 3.1, calcium 8.8, LFTs within normal limits, free T4 1.23, WBC count 10.2, hemoglobin 14.1, MCV 96.4, platelet count 3 1 7, ANC 7.82.  CEA 2.2      Imagin2024 CT chest abdomen pelvis with contrast:  Prostatomegaly, heterogeneous enhancement is also present in the prostate gland which may be benign however  correlation PSA to exclude tumor is recommended platelet bladder calculi again noted identified as well as irregularity of the bladder wall, bladder cavity lined primary vesicular fat stranding suggestive of cystitis, no evidence of metastatic disease.    02/29/2024 CT chest abdomen pelvis with contrast no significant change.  No evidence of metastatic disease to the chest.  Chronic findings as noted above.  No significant change in the abdomen.  No evidence of residual or recurrent tumor.  No evidence of metastatic disease to the abdomen or pelvis.  Removal of Muhammad catheter with placement of suprapubic bladder catheter.  There was persistent inflammatory stranding along the superior aspect of the bladder with numerous tiny bladder diverticuli.  No evidence of extraluminal air or fluid to suggest perforation.  Status post cholecystectomy.  Diffuse fatty infiltration of the liver.  Stable right renal cysts.  Other chronic findings as noted above.    8/15/2023:  CT of the chest with contrast:  Impression:  Mild fatty prominence along the right lateral chest wall felt to represent the abnormality seen on recent chest x-ray.  No evidence of any suspicious mass.    CT of the chest ,abdomen , and pelvis obtained on June 2, 2023, and compared  to  the November 08/2022, reveals no evidence of metastatic disease in the lung.  There are in the abdomen various nodes that are borderline in size.  They are also stable nodes in the retroperitoneum.  A ventral hernia is seen and, he has multiple stones and diverticuli in the bladder.    12/19/2022:  Ultrasound thyroid.  Thyroid tissue not clearly identified, although thyroid gland may be diffusely heterogenous, although with no gross focal thyroid nodule identified.  11/08/2022 CT chest abdomen pelvis with contrast:  No evidence of metastatic disease.  No significant change since prior study in the chest or abdomen.    04/26/2022 CT chest abdomen pelvis with contrast:  No  evidence of metastatic disease or other significant change since previous CT dated 11/30/2021.      11/30/21 CT C/A/P: stable chest CT with no evidence of metastatic disease. No evidence of metastatic disease or other significant changes in abdomen/pelvis since prior CT    7/16/21 CT C/A/P w/ IV contrast: no evidence of metastatic disease in chest. No evidence of neoplastic disease within abd or pelvis. Fatty liver has worsened. Prostate enlargement. Multiple bladder calculi. Distal colonic diverticulosis. Atherosclerosis.    3/16/21 CT C/A/P: No significant changes and no evidence of metastatic disease in the chest. Decrease in the size of the small soft tissue density nodules in the left upper quadrant. No other significant changes.    11/30/20 CT C/A/P w/ IV contrast: no evidence of thoracic metastatic disease. Left upper quadrant abd wall thickening has decreased slightly. Small area of soft tissue density seen in left upper quadrant adjacent to colon. Measures minimally smaller than on prior exam. Additional area of soft tissue density adjacent to stomach and pancreatic tail has not changed. Overall, several small soft tissue masses remain with left abdomen and left abdominal wall, few of those minimally decreased in size from prior scan.    9/18/20 CT C/A/P w/ IV contrast: no evidence of thoracic metastatic disease. Significant decrease in size of previously described masses within abd wall on the left. Residual mild localized soft tissue thickening of upper anterior abd wall to the left of midline adjacent to left lobe of liver at site of previously demonstrated mass. Residual oval shaped soft tissue mass more caudally on the left [id number] suspicious for residual tumor. Also residual mass in left upper quadrant between gastric body and tAil of pancreas that has also decreased in size and remains contiguous with gastric wall. Omental mass decreased to 3cm.    6/9/20 CT C/A/P w/ IV contrast: no significant  cardiopulmonary abnormality. Subtle nodularity in right lobe of thyroid lobe. Liver unremarkable. Several solid lesions involving the abdominal wall. 3.9cm lesion in upper abd wall to left of midline. 4.9cm lesion at more caudal level. Several solid lesions are seen within omental fat of left upper quadrant. Portion of this appears to be attached to stomach.    4/9/20 RLE venous duplex: occlusive DVT right popliteal vein.    11/15/19 CT A/P w/ IV contrast: colonic mass at splenic flexure 7.6x4.3cm, contiguous with the stomach. Numerous diverticula in descending and sigmoid colon    Path:  11/26/19 left colectomy with partial gastrectomy: tumor site splenic flexure, greatest dimension 8.5cm, no perforation, adenocarcinoma, mod to poorly differentiated, tumor invades visceral peritoneum and directly invades gastric wall. Margins neg. +LVI, +PNI, no tumor deposits. 3/14 LNs+.  [id number]    11/15/19 colon 60cm biopsy: poorly differentiated adenocarcinoma.  MLH1 loss of expression. MSH2 and MSH6 no loss of expression, PMS2 loss of expression. MSI-H both by MMR IHC and MSI PCR       Past Medical History:   Diagnosis Date    BPH (benign prostatic hyperplasia)     Cancer     Colon cancer     DVT (deep venous thrombosis)     Hypertension     Suprapubic catheter 12/06/2023       Past Surgical History:   Procedure Laterality Date    ADENOIDECTOMY      BLADDER STONE REMOVAL  08/17/2023    CHOLECYSTECTOMY      COLECTOMY      EYE SURGERY      TONSILLECTOMY         Family History   Problem Relation Name Age of Onset    Lung cancer Mother         Social History     Socioeconomic History    Marital status:    Tobacco Use    Smoking status: Former    Smokeless tobacco: Never   Substance and Sexual Activity    Alcohol use: Not Currently    Drug use: Never    Sexual activity: Not Currently       Current Outpatient Medications   Medication Sig Dispense Refill    doxycycline (MONODOX) 100 MG capsule Take 100 mg by mouth.       hyoscyamine (ANASPAZ,LEVSIN) 0.125 mg Tab Take 125 mcg by mouth. (Patient not taking: Reported on 12/12/2024)      levothyroxine (SYNTHROID) 200 MCG tablet Take 1 tablet (200 mcg total) by mouth once daily. 30 tablet 3     No current facility-administered medications for this visit.       Review of patient's allergies indicates:  No Known Allergies      Review of Systems:  CONSTITUTIONAL: no fevers, no chills, no weight loss, + chronic fatigue.   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  no tinnitus or hearing loss, no nose bleeding, no dysphagia, no odynophagia, no dental pain.   CVS: no chest pain, no palpitations, no dyspnea on exertion  RESP: no shortness of breath, no hemoptysis, no cough  GI: no nausea, no vomiting, no diarrhea, no constipation, no melena, no hematochezia, no hematemesis, no abdominal pain, no increase in abdominal girth  : Indwelling suprapubic Catheter, no dysuria, no hematuria, no hesitancy, no scrotal swelling, no discharge  INTEGUMENT: no rashes, no abnormal bruising, no nail pitting, no hyperpigmentation  NEURO: no falls, no memory loss, no paresthesias or dysesthesias, no urofecal incontinence or retention, no loss of strength on any extremity  MSK: no back pain, no new joint pain, no joint swelling, no muscle weakness  PSYCH: no suicidal or homicidal ideation, no depression, no insomnia, no anhedonia  ENDOCRINE: no heat or cold intolerance, no polyuria, no polydipsia    Objective:     There were no vitals filed for this visit.           Physical Exam:  GA: AAOx3, NAD  HEENT:  moist oral mucous membranes  RESP:  Equal chest rise, no accessory muscle use  ABDOMEN:  Distended, soft, nontender, no guarding or rigidity  :  Indwelling Muhammad catheter  EXT: no deformities, no pedal edema  SKIN: no rashes, warm and dry  NEURO: normal mentation, no  gross neuro deficits          Assessment:   1. 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  Keytruda started on 7/2/20. Changed to q6week on 12/23/20  CT C/A/P on 7/16/21 and 11/2021 essentially REIC.  CT chest abdomen pelvis in April 2022 with ERIC  Previously discussed with patient and daughter the options to continue close surveillance versus restart treatment with pembrolizumab given patient's long history of ERIC and almost 2 years of treatment with pembrolizumab.  Patient and daughter would like to continue pembrolizumab with plans to decrease dose or increase the dosing interval if he were to have recurrent side effects from treatment.  Will plan to obtain imaging q4-6 months or as clinically indicated.    CT chest abdomen pelvis with contrast in September 2024 with no evidence of metastatic.  Patient is established and follows with Urology for indwelling Muhammad and enlarged prostate     Plan:     Labs reviewed and ok to proceed with Keytruda today.  Continue Levothyroxine 225 mcg PO QD  Follow-up with urology as scheduled.  Patient to follow-up in 6 weeks repeat labs including CBC, CMP, TSH,CEA, and treatment.  Will plan to scan 4/2025    Renee PEREIRA

## 2025-02-04 ENCOUNTER — OFFICE VISIT (OUTPATIENT)
Dept: HEMATOLOGY/ONCOLOGY | Facility: CLINIC | Age: OVER 89
End: 2025-02-04
Payer: MEDICARE

## 2025-02-04 VITALS
RESPIRATION RATE: 18 BRPM | DIASTOLIC BLOOD PRESSURE: 69 MMHG | HEART RATE: 65 BPM | BODY MASS INDEX: 39.98 KG/M2 | HEIGHT: 67 IN | OXYGEN SATURATION: 95 % | WEIGHT: 254.69 LBS | SYSTOLIC BLOOD PRESSURE: 117 MMHG | TEMPERATURE: 98 F

## 2025-02-04 DIAGNOSIS — N40.0 ENLARGED PROSTATE: ICD-10-CM

## 2025-02-04 DIAGNOSIS — E04.1 THYROID NODULE: ICD-10-CM

## 2025-02-04 DIAGNOSIS — Z51.12 ENCOUNTER FOR ANTINEOPLASTIC IMMUNOTHERAPY: ICD-10-CM

## 2025-02-04 DIAGNOSIS — C18.9 COLON CANCER METASTASIZED TO MULTIPLE SITES: Primary | ICD-10-CM

## 2025-02-04 DIAGNOSIS — E06.4 DRUG-INDUCED THYROIDITIS: ICD-10-CM

## 2025-02-04 PROCEDURE — 99215 OFFICE O/P EST HI 40 MIN: CPT | Mod: ,,,

## 2025-02-04 RX ORDER — DIPHENHYDRAMINE HYDROCHLORIDE 50 MG/ML
50 INJECTION INTRAMUSCULAR; INTRAVENOUS ONCE AS NEEDED
Status: CANCELLED | OUTPATIENT
Start: 2025-02-04

## 2025-02-04 RX ORDER — SODIUM CHLORIDE 0.9 % (FLUSH) 0.9 %
10 SYRINGE (ML) INJECTION
Status: CANCELLED | OUTPATIENT
Start: 2025-02-04

## 2025-02-04 RX ORDER — NITROFURANTOIN 25; 75 MG/1; MG/1
100 CAPSULE ORAL 2 TIMES DAILY
COMMUNITY
Start: 2025-02-02

## 2025-02-04 RX ORDER — HEPARIN 100 UNIT/ML
500 SYRINGE INTRAVENOUS
Status: CANCELLED | OUTPATIENT
Start: 2025-02-04

## 2025-02-04 RX ORDER — EPINEPHRINE 0.3 MG/.3ML
0.3 INJECTION SUBCUTANEOUS ONCE AS NEEDED
Status: CANCELLED | OUTPATIENT
Start: 2025-02-04

## 2025-03-01 DIAGNOSIS — E03.9 HYPOTHYROIDISM, UNSPECIFIED TYPE: ICD-10-CM

## 2025-03-05 RX ORDER — LEVOTHYROXINE SODIUM 200 UG/1
200 TABLET ORAL DAILY
Qty: 30 TABLET | Refills: 2 | Status: SHIPPED | OUTPATIENT
Start: 2025-03-05 | End: 2025-04-04

## 2025-03-20 ENCOUNTER — OFFICE VISIT (OUTPATIENT)
Dept: HEMATOLOGY/ONCOLOGY | Facility: CLINIC | Age: OVER 89
End: 2025-03-20
Payer: MEDICARE

## 2025-03-20 VITALS
OXYGEN SATURATION: 96 % | BODY MASS INDEX: 40.32 KG/M2 | WEIGHT: 256.88 LBS | RESPIRATION RATE: 14 BRPM | HEIGHT: 67 IN | TEMPERATURE: 98 F | HEART RATE: 86 BPM | SYSTOLIC BLOOD PRESSURE: 163 MMHG | DIASTOLIC BLOOD PRESSURE: 86 MMHG

## 2025-03-20 DIAGNOSIS — C18.9 COLON CANCER METASTASIZED TO MULTIPLE SITES: Primary | ICD-10-CM

## 2025-03-20 DIAGNOSIS — E06.4 DRUG-INDUCED THYROIDITIS: ICD-10-CM

## 2025-03-20 DIAGNOSIS — C78.6 SECONDARY MALIGNANT NEOPLASM OF RETROPERITONEUM AND PERITONEUM: ICD-10-CM

## 2025-03-20 PROCEDURE — 99215 OFFICE O/P EST HI 40 MIN: CPT | Mod: ,,, | Performed by: STUDENT IN AN ORGANIZED HEALTH CARE EDUCATION/TRAINING PROGRAM

## 2025-03-20 PROCEDURE — G2211 COMPLEX E/M VISIT ADD ON: HCPCS | Mod: ,,, | Performed by: STUDENT IN AN ORGANIZED HEALTH CARE EDUCATION/TRAINING PROGRAM

## 2025-03-20 RX ORDER — SODIUM CHLORIDE 0.9 % (FLUSH) 0.9 %
10 SYRINGE (ML) INJECTION
Status: CANCELLED | OUTPATIENT
Start: 2025-03-20

## 2025-03-20 RX ORDER — HEPARIN 100 UNIT/ML
500 SYRINGE INTRAVENOUS
Status: CANCELLED | OUTPATIENT
Start: 2025-03-20

## 2025-03-20 RX ORDER — DIPHENHYDRAMINE HYDROCHLORIDE 50 MG/ML
50 INJECTION, SOLUTION INTRAMUSCULAR; INTRAVENOUS ONCE AS NEEDED
Status: CANCELLED | OUTPATIENT
Start: 2025-03-20

## 2025-03-20 RX ORDER — EPINEPHRINE 0.3 MG/.3ML
0.3 INJECTION SUBCUTANEOUS ONCE AS NEEDED
Status: CANCELLED | OUTPATIENT
Start: 2025-03-20

## 2025-03-20 NOTE — PROGRESS NOTES
Subjective:      Patient ID: Stefano Mccoy is a 89 y.o. male.    Chief Complaint:  Here for my treatment.    History of Present Illness  Chief complaint: Metastatic colon cancer, MSI-H    Onc Hx:  7/2/20-7/9/20: injectafer x2  7/2/20-current: keytruda    HPI: 88 y/o M w/ PMHx of HTN, DVT postop after colectomy (was on Xarelto 11/2019-6/2020, Xarelto stopped due to GI bleed and anemia requiring frequent transfusions), VALENTIN due to GI blood loss, BPH first presented with hematochezia 10-11/2019. He had a colonoscopy with Dr Lima that showed a mass at 60cm, biopsy with poorly differentiated adenocarcinoma. On 11/26/19 he had a left colectomy with partial gastrectomy with Dr Liu, showing poorly differentiated adenocarcinoma invading gastric wall and visceral peritoneum and with 3/14 LNs+. Due to his advanced age, no chemotherapy was offered. 3/2020 he was noted to be anemic, given IV iron infusion and referred back to Dr Liu. He had imaging at [facility] showing widespread intra-abdominal metastatic disease. Referred to [facility] to establish care    He was given 2 doses of injectafer 7/2020  He was started on Keytruda for MSI-H metastatic colon cancer on 7/2/20    Interval history:     Today 03/20/2025, Patient denies any acute concerns today. He continues tolerating treatment well without significant side effects.  He denies any new symptoms of pain, decreased appetite or weight loss.  He denies any blood in his stools or dark tarry stools.    Labs:    03/19/2025 creatinine 1.4, TSH 31.7, LFTs unremarkable, CBC unremarkable, CEA 2.6, cortisol 14.3    2/3/25: CEA pending, CMP and CBC unremarkable, tsh 1.55, cortisol pending     12/11/24: CEA 2.8, CMP and CBC unremarkable, tsh 1.90, cortisol 1.9,   10/30/24: CMP unremarkable, TSH 3.09, cortisol 10.5T4 1.34, PSA 20.8, wbc 8.2, hgb 15.6, plt 311  09/18/2024:  Creatinine 1.3, albumin 3.1, LFTs unremarkable, TSH 6.5, WBC count 9.5, hemoglobin 15.9, MCV 97.4, platelet count  369, CEA 2.3, cortisol 10.3.    2024 creatinine 1.4, albumin 3.3, calcium 9.2, CEA 2.8, TSH 4.44, WBC 9.0, hemoglobin 16.6, MCV 96.4, platelet count 340, ANC 6.27    2024 creatinine 1.26, albumin 3.7, calcium 10, CEA 2.8, TSH 22.7730, WBC 9.98, hemoglobin 17, MCV 95, platelet count 379, ANC 6.18    5/15/2024 creatinine 1.12, albumin 3.3, calcium 8.9, CEA 2.3, TSH 1.9118, WBC 7.09, hemoglobin 15.3, MCV 94.9, platelet count 348, ANC 4.56    2024 creatinine 1.12, albumin 3.6, calcium 9.8, CEA 2.8, TSH 12.9, cortisol 14.5, hemoglobin 16.3, MCV 95.9, WBC count 10.1, platelet count 379, ANC 6.76.  24 Cr 1.2, tsh 15.63, CEA pending, WBC 8.7, Hgb 16, , ANC 5.88   1/10/24:  Creatinine 1.3, Cortisol 15.1, TSH 25, and CEA pending, WBC 9.8 hgb 15.6, Hct 47.6, Plt 343,000.  2023:  Creatinine 1.13, Cortisol 12.9, TSH 26.36, and CEA 3.0, WBC 8.53 hgb 15.7, Hct 47.9, Plt 333,000.  10/11/2023:  Creatinine 1.11.  Cortisol 8.8 TSH 6.71 and CEA 2.1.  WBC 8.35 hgb 15.6, Hct 47.9, Plt 380,000  2023:  CBC WNL.  CMP WNL with creatinine 1.07.  Cortisol 12.2, TSH 4.33, and CEA 2.6  2023 CMP unremarkable, TSH 11.28, CBC unremarkable.  CEA pending.  Phosphorus 2.7.  2023 :  CMP dated 2023 is completely normal.  The TSH however is below 0.3 uIU/ml.  CBC shows hemoglobin of 15, white cell count 10,500 slightly elevated platelets 395, the differential is essentially normal.  April 10, 2023:  CMP dated 2023 is essentially normal except for a low albumin at 3.3 CBC is normal except for  abnormal indices , RBC and slight thrombocytosis  2023:  Creatinine 11.4, potassium 5.3, bicarb 20, sodium 131.  2023:  TSH 4.4, cortisol 13.6, CEA 2.2, creatinine 7.3, albumin 3.1, calcium 8.8, LFTs within normal limits, free T4 1.23, WBC count 10.2, hemoglobin 14.1, MCV 96.4, platelet count 3 1 7, ANC 7.82.  CEA 2.2      Imagin2024 CT chest abdomen pelvis with contrast:   Prostatomegaly, heterogeneous enhancement is also present in the prostate gland which may be benign however correlation PSA to exclude tumor is recommended platelet bladder calculi again noted identified as well as irregularity of the bladder wall, bladder cavity lined primary vesicular fat stranding suggestive of cystitis, no evidence of metastatic disease.    02/29/2024 CT chest abdomen pelvis with contrast no significant change.  No evidence of metastatic disease to the chest.  Chronic findings as noted above.  No significant change in the abdomen.  No evidence of residual or recurrent tumor.  No evidence of metastatic disease to the abdomen or pelvis.  Removal of Muhammad catheter with placement of suprapubic bladder catheter.  There was persistent inflammatory stranding along the superior aspect of the bladder with numerous tiny bladder diverticuli.  No evidence of extraluminal air or fluid to suggest perforation.  Status post cholecystectomy.  Diffuse fatty infiltration of the liver.  Stable right renal cysts.  Other chronic findings as noted above.    8/15/2023:  CT of the chest with contrast:  Impression:  Mild fatty prominence along the right lateral chest wall felt to represent the abnormality seen on recent chest x-ray.  No evidence of any suspicious mass.    CT of the chest ,abdomen , and pelvis obtained on June 2, 2023, and compared  to  the November 08/2022, reveals no evidence of metastatic disease in the lung.  There are in the abdomen various nodes that are borderline in size.  They are also stable nodes in the retroperitoneum.  A ventral hernia is seen and, he has multiple stones and diverticuli in the bladder.    12/19/2022:  Ultrasound thyroid.  Thyroid tissue not clearly identified, although thyroid gland may be diffusely heterogenous, although with no gross focal thyroid nodule identified.  11/08/2022 CT chest abdomen pelvis with contrast:  No evidence of metastatic disease.  No significant change  since prior study in the chest or abdomen.    04/26/2022 CT chest abdomen pelvis with contrast:  No evidence of metastatic disease or other significant change since previous CT dated 11/30/2021.      11/30/21 CT C/A/P: stable chest CT with no evidence of metastatic disease. No evidence of metastatic disease or other significant changes in abdomen/pelvis since prior CT    7/16/21 CT C/A/P w/ IV contrast: no evidence of metastatic disease in chest. No evidence of neoplastic disease within abd or pelvis. Fatty liver has worsened. Prostate enlargement. Multiple bladder calculi. Distal colonic diverticulosis. Atherosclerosis.    3/16/21 CT C/A/P: No significant changes and no evidence of metastatic disease in the chest. Decrease in the size of the small soft tissue density nodules in the left upper quadrant. No other significant changes.    11/30/20 CT C/A/P w/ IV contrast: no evidence of thoracic metastatic disease. Left upper quadrant abd wall thickening has decreased slightly. Small area of soft tissue density seen in left upper quadrant adjacent to colon. Measures minimally smaller than on prior exam. Additional area of soft tissue density adjacent to stomach and pancreatic tail has not changed. Overall, several small soft tissue masses remain with left abdomen and left abdominal wall, few of those minimally decreased in size from prior scan.    9/18/20 CT C/A/P w/ IV contrast: no evidence of thoracic metastatic disease. Significant decrease in size of previously described masses within abd wall on the left. Residual mild localized soft tissue thickening of upper anterior abd wall to the left of midline adjacent to left lobe of liver at site of previously demonstrated mass. Residual oval shaped soft tissue mass more caudally on the left [id number] suspicious for residual tumor. Also residual mass in left upper quadrant between gastric body and tAil of pancreas that has also decreased in size and remains contiguous with  gastric wall. Omental mass decreased to 3cm.    6/9/20 CT C/A/P w/ IV contrast: no significant cardiopulmonary abnormality. Subtle nodularity in right lobe of thyroid lobe. Liver unremarkable. Several solid lesions involving the abdominal wall. 3.9cm lesion in upper abd wall to left of midline. 4.9cm lesion at more caudal level. Several solid lesions are seen within omental fat of left upper quadrant. Portion of this appears to be attached to stomach.    4/9/20 RLE venous duplex: occlusive DVT right popliteal vein.    11/15/19 CT A/P w/ IV contrast: colonic mass at splenic flexure 7.6x4.3cm, contiguous with the stomach. Numerous diverticula in descending and sigmoid colon    Path:  11/26/19 left colectomy with partial gastrectomy: tumor site splenic flexure, greatest dimension 8.5cm, no perforation, adenocarcinoma, mod to poorly differentiated, tumor invades visceral peritoneum and directly invades gastric wall. Margins neg. +LVI, +PNI, no tumor deposits. 3/14 LNs+.  [id number]    11/15/19 colon 60cm biopsy: poorly differentiated adenocarcinoma.  MLH1 loss of expression. MSH2 and MSH6 no loss of expression, PMS2 loss of expression. MSI-H both by MMR IHC and MSI PCR       Past Medical History:   Diagnosis Date    Bladder infection     BPH (benign prostatic hyperplasia)     Cancer     Colon cancer     DVT (deep venous thrombosis)     Hypertension     Suprapubic catheter 12/06/2023       Past Surgical History:   Procedure Laterality Date    ADENOIDECTOMY      BLADDER STONE REMOVAL  08/17/2023    CHOLECYSTECTOMY      COLECTOMY      EYE SURGERY      TONSILLECTOMY         Family History   Problem Relation Name Age of Onset    Lung cancer Mother         Social History     Socioeconomic History    Marital status:    Tobacco Use    Smoking status: Former    Smokeless tobacco: Never   Substance and Sexual Activity    Alcohol use: Not Currently    Drug use: Never    Sexual activity: Not Currently       Current Outpatient  Medications   Medication Sig Dispense Refill    doxycycline (MONODOX) 100 MG capsule Take 100 mg by mouth.      levothyroxine (SYNTHROID) 200 MCG tablet Take 1 tablet (200 mcg total) by mouth once daily. 30 tablet 2    nitrofurantoin, macrocrystal-monohydrate, (MACROBID) 100 MG capsule Take 100 mg by mouth 2 (two) times daily.      hyoscyamine (ANASPAZ,LEVSIN) 0.125 mg Tab Take 125 mcg by mouth. (Patient not taking: Reported on 12/12/2024)       No current facility-administered medications for this visit.       Review of patient's allergies indicates:  No Known Allergies      Review of Systems:  CONSTITUTIONAL: no fevers, no chills, no weight loss, + chronic fatigue.   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  no tinnitus or hearing loss, no nose bleeding, no dysphagia, no odynophagia, no dental pain.   CVS: no chest pain, no palpitations, no dyspnea on exertion  RESP: no shortness of breath, no hemoptysis, no cough  GI: no nausea, no vomiting, no diarrhea, no constipation, no melena, no hematochezia, no hematemesis, no abdominal pain, no increase in abdominal girth  : Indwelling suprapubic Catheter, no dysuria, no hematuria, no hesitancy, no scrotal swelling, no discharge  INTEGUMENT: no rashes, no abnormal bruising, no nail pitting, no hyperpigmentation  NEURO: no falls, no memory loss, no paresthesias or dysesthesias, no urofecal incontinence or retention, no loss of strength on any extremity  MSK: no back pain, no new joint pain, no joint swelling, no muscle weakness  PSYCH: no suicidal or homicidal ideation, no depression, no insomnia, no anhedonia  ENDOCRINE: no heat or cold intolerance, no polyuria, no polydipsia    Objective:     Vitals:    03/20/25 1012   BP: (!) 163/86   Pulse: 86   Resp: 14   Temp: 97.8 °F (36.6 °C)              Physical Exam:  GA: AAOx3,  NAD  HEENT:  moist oral mucous membranes  RESP:  Equal chest rise, no accessory muscle use  ABDOMEN:  Distended, soft, nontender, no guarding or rigidity  :  Indwelling Muhammad catheter  EXT: no deformities, no pedal edema  SKIN: no rashes, warm and dry  NEURO: normal mentation, no gross neuro deficits          Assessment:   1. 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  Keytruda started on 7/2/20. Changed to q6week on 12/23/20  CT C/A/P on 7/16/21 and 11/2021 essentially ERIC.  CT chest abdomen pelvis in April 2022 with ERIC  Previously discussed with patient and daughter the options to continue close surveillance versus restart treatment with pembrolizumab given patient's long history of ERIC and almost 2 years of treatment with pembrolizumab.  Patient and daughter would like to continue pembrolizumab with plans to decrease dose or increase the dosing interval if he were to have recurrent side effects from treatment.  Will plan to obtain imaging q4-6 months or as clinically indicated.    CT chest abdomen pelvis with contrast in September 2024 with no evidence of metastatic.  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  Continue Levothyroxine 225 mcg PO QD, emphasized the need for compliance, patient has had waxing and  waning TSH likely due to noncompliance  Follow-up with urology as scheduled.  Patient to follow-up in 6 weeks repeat labs including CBC, CMP, TSH,CEA, and treatment.  CT chest abdomen pelvis with contrast prior to next follow-up visit with us to assess treatment response.        Portions of the record may have been created with voice recognition software. Occasional wrong-word or sound-a-like substitutions may have occurred due to the inherent limitations of voice recognition software.

## 2025-04-29 ENCOUNTER — OFFICE VISIT (OUTPATIENT)
Dept: HEMATOLOGY/ONCOLOGY | Facility: CLINIC | Age: OVER 89
End: 2025-04-29
Payer: MEDICARE

## 2025-04-29 VITALS
SYSTOLIC BLOOD PRESSURE: 164 MMHG | BODY MASS INDEX: 39.82 KG/M2 | HEIGHT: 67 IN | TEMPERATURE: 98 F | DIASTOLIC BLOOD PRESSURE: 92 MMHG | WEIGHT: 253.69 LBS | RESPIRATION RATE: 14 BRPM | HEART RATE: 72 BPM | OXYGEN SATURATION: 95 %

## 2025-04-29 DIAGNOSIS — C18.9 COLON CANCER METASTASIZED TO MULTIPLE SITES: Primary | ICD-10-CM

## 2025-04-29 DIAGNOSIS — C78.6 SECONDARY MALIGNANT NEOPLASM OF RETROPERITONEUM AND PERITONEUM: ICD-10-CM

## 2025-04-29 DIAGNOSIS — E06.4 DRUG-INDUCED THYROIDITIS: ICD-10-CM

## 2025-04-29 RX ORDER — EPINEPHRINE 0.3 MG/.3ML
0.3 INJECTION SUBCUTANEOUS ONCE AS NEEDED
Status: CANCELLED | OUTPATIENT
Start: 2025-04-29

## 2025-04-29 RX ORDER — SODIUM CHLORIDE 0.9 % (FLUSH) 0.9 %
10 SYRINGE (ML) INJECTION
Status: CANCELLED | OUTPATIENT
Start: 2025-04-29

## 2025-04-29 RX ORDER — HEPARIN 100 UNIT/ML
500 SYRINGE INTRAVENOUS
Status: CANCELLED | OUTPATIENT
Start: 2025-04-29

## 2025-04-29 RX ORDER — DIPHENHYDRAMINE HYDROCHLORIDE 50 MG/ML
50 INJECTION, SOLUTION INTRAMUSCULAR; INTRAVENOUS ONCE AS NEEDED
Status: CANCELLED | OUTPATIENT
Start: 2025-04-29

## 2025-04-29 NOTE — PROGRESS NOTES
Subjective:      Patient ID: Stefano Mccoy is a 89 y.o. male.    Chief Complaint:  Here for my treatment.    History of Present Illness  Chief complaint: Metastatic colon cancer, MSI-H    Onc Hx:  7/2/20-7/9/20: injectafer x2  7/2/20-current: keytruda    HPI: 88 y/o M w/ PMHx of HTN, DVT postop after colectomy (was on Xarelto 11/2019-6/2020, Xarelto stopped due to GI bleed and anemia requiring frequent transfusions), VALENTIN due to GI blood loss, BPH first presented with hematochezia 10-11/2019. He had a colonoscopy with Dr Lima that showed a mass at 60cm, biopsy with poorly differentiated adenocarcinoma. On 11/26/19 he had a left colectomy with partial gastrectomy with Dr Liu, showing poorly differentiated adenocarcinoma invading gastric wall and visceral peritoneum and with 3/14 LNs+. Due to his advanced age, no chemotherapy was offered. 3/2020 he was noted to be anemic, given IV iron infusion and referred back to Dr Liu. He had imaging at [facility] showing widespread intra-abdominal metastatic disease. Referred to [facility] to establish care    He was given 2 doses of injectafer 7/2020  He was started on Keytruda for MSI-H metastatic colon cancer on 7/2/20    Interval history:     Today , 04/29/2025,, Patient denies any acute concerns today. He continues tolerating treatment well without significant side effects.  He denies any new symptoms of pain, decreased appetite or weight loss.  He denies any blood in his stools or dark tarry stools.    Labs:    04/28/2025 creatinine 1.3, LFTs unremarkable, TSH 13.9, WBC count 10.6, hemoglobin 16.1, MCV 97.2, platelet count 407, CEA pending.      03/19/2025 creatinine 1.4, TSH 31.7, LFTs unremarkable, CBC unremarkable, CEA 2.6, cortisol 14.3    2/3/25: CEA pending, CMP and CBC unremarkable, tsh 1.55, cortisol pending     12/11/24: CEA 2.8, CMP and CBC unremarkable, tsh 1.90, cortisol 1.9,   10/30/24: CMP unremarkable, TSH 3.09, cortisol 10.5T4 1.34, PSA 20.8, wbc 8.2, hgb 15.6,  plt 311  09/18/2024:  Creatinine 1.3, albumin 3.1, LFTs unremarkable, TSH 6.5, WBC count 9.5, hemoglobin 15.9, MCV 97.4, platelet count 369, CEA 2.3, cortisol 10.3.    8/7/2024 creatinine 1.4, albumin 3.3, calcium 9.2, CEA 2.8, TSH 4.44, WBC 9.0, hemoglobin 16.6, MCV 96.4, platelet count 340, ANC 6.27    6/27/2024 creatinine 1.26, albumin 3.7, calcium 10, CEA 2.8, TSH 22.7730, WBC 9.98, hemoglobin 17, MCV 95, platelet count 379, ANC 6.18    5/15/2024 creatinine 1.12, albumin 3.3, calcium 8.9, CEA 2.3, TSH 1.9118, WBC 7.09, hemoglobin 15.3, MCV 94.9, platelet count 348, ANC 4.56    04/03/2024 creatinine 1.12, albumin 3.6, calcium 9.8, CEA 2.8, TSH 12.9, cortisol 14.5, hemoglobin 16.3, MCV 95.9, WBC count 10.1, platelet count 379, ANC 6.76.  2/21/24 Cr 1.2, tsh 15.63, CEA pending, WBC 8.7, Hgb 16, , ANC 5.88   1/10/24:  Creatinine 1.3, Cortisol 15.1, TSH 25, and CEA pending, WBC 9.8 hgb 15.6, Hct 47.6, Plt 343,000.  11/29/2023:  Creatinine 1.13, Cortisol 12.9, TSH 26.36, and CEA 3.0, WBC 8.53 hgb 15.7, Hct 47.9, Plt 333,000.  10/11/2023:  Creatinine 1.11.  Cortisol 8.8 TSH 6.71 and CEA 2.1.  WBC 8.35 hgb 15.6, Hct 47.9, Plt 380,000  8/30/2023:  CBC WNL.  CMP WNL with creatinine 1.07.  Cortisol 12.2, TSH 4.33, and CEA 2.6  07/19/2023 CMP unremarkable, TSH 11.28, CBC unremarkable.  CEA pending.  Phosphorus 2.7.  June 8, 2023 :  CMP dated 06/06/2023 is completely normal.  The TSH however is below 0.3 uIU/ml.  CBC shows hemoglobin of 15, white cell count 10,500 slightly elevated platelets 395, the differential is essentially normal.  April 10, 2023:  CMP dated 04/09/2023 is essentially normal except for a low albumin at 3.3 CBC is normal except for  abnormal indices , RBC and slight thrombocytosis  03/16/2023:  Creatinine 11.4, potassium 5.3, bicarb 20, sodium 131.  03/14/2023:  TSH 4.4, cortisol 13.6, CEA 2.2, creatinine 7.3, albumin 3.1, calcium 8.8, LFTs within normal limits, free T4 1.23, WBC count 10.2,  hemoglobin 14.1, MCV 96.4, platelet count 3 1 7, ANC 7.82.  CEA 2.2      Imagin2025 CT chest abdomen pelvis with contrast:  No definitive CT evidence of metastatic disease in the chest abdomen or pelvis.  Similar abnormal circumferential urinary bladder wall thickening with suprapubic catheter in place and perivesicular stranding as well as intravesicular calculi with associated with prostatomegaly findings could reflect sequelae of chronic outlet obstruction although underlying cystitis possible.  Other chronic findings and incidental findings as above.    2024 CT chest abdomen pelvis with contrast:  Prostatomegaly, heterogeneous enhancement is also present in the prostate gland which may be benign however correlation PSA to exclude tumor is recommended platelet bladder calculi again noted identified as well as irregularity of the bladder wall, bladder cavity lined primary vesicular fat stranding suggestive of cystitis, no evidence of metastatic disease.    2024 CT chest abdomen pelvis with contrast no significant change.  No evidence of metastatic disease to the chest.  Chronic findings as noted above.  No significant change in the abdomen.  No evidence of residual or recurrent tumor.  No evidence of metastatic disease to the abdomen or pelvis.  Removal of Muhammad catheter with placement of suprapubic bladder catheter.  There was persistent inflammatory stranding along the superior aspect of the bladder with numerous tiny bladder diverticuli.  No evidence of extraluminal air or fluid to suggest perforation.  Status post cholecystectomy.  Diffuse fatty infiltration of the liver.  Stable right renal cysts.  Other chronic findings as noted above.    8/15/2023:  CT of the chest with contrast:  Impression:  Mild fatty prominence along the right lateral chest wall felt to represent the abnormality seen on recent chest x-ray.  No evidence of any suspicious mass.    CT of the chest ,abdomen , and  pelvis obtained on June 2, 2023, and compared  to  the November 08/2022, reveals no evidence of metastatic disease in the lung.  There are in the abdomen various nodes that are borderline in size.  They are also stable nodes in the retroperitoneum.  A ventral hernia is seen and, he has multiple stones and diverticuli in the bladder.    12/19/2022:  Ultrasound thyroid.  Thyroid tissue not clearly identified, although thyroid gland may be diffusely heterogenous, although with no gross focal thyroid nodule identified.  11/08/2022 CT chest abdomen pelvis with contrast:  No evidence of metastatic disease.  No significant change since prior study in the chest or abdomen.    04/26/2022 CT chest abdomen pelvis with contrast:  No evidence of metastatic disease or other significant change since previous CT dated 11/30/2021.      11/30/21 CT C/A/P: stable chest CT with no evidence of metastatic disease. No evidence of metastatic disease or other significant changes in abdomen/pelvis since prior CT    7/16/21 CT C/A/P w/ IV contrast: no evidence of metastatic disease in chest. No evidence of neoplastic disease within abd or pelvis. Fatty liver has worsened. Prostate enlargement. Multiple bladder calculi. Distal colonic diverticulosis. Atherosclerosis.    3/16/21 CT C/A/P: No significant changes and no evidence of metastatic disease in the chest. Decrease in the size of the small soft tissue density nodules in the left upper quadrant. No other significant changes.    11/30/20 CT C/A/P w/ IV contrast: no evidence of thoracic metastatic disease. Left upper quadrant abd wall thickening has decreased slightly. Small area of soft tissue density seen in left upper quadrant adjacent to colon. Measures minimally smaller than on prior exam. Additional area of soft tissue density adjacent to stomach and pancreatic tail has not changed. Overall, several small soft tissue masses remain with left abdomen and left abdominal wall, few of those  minimally decreased in size from prior scan.    9/18/20 CT C/A/P w/ IV contrast: no evidence of thoracic metastatic disease. Significant decrease in size of previously described masses within abd wall on the left. Residual mild localized soft tissue thickening of upper anterior abd wall to the left of midline adjacent to left lobe of liver at site of previously demonstrated mass. Residual oval shaped soft tissue mass more caudally on the left [id number] suspicious for residual tumor. Also residual mass in left upper quadrant between gastric body and tAil of pancreas that has also decreased in size and remains contiguous with gastric wall. Omental mass decreased to 3cm.    6/9/20 CT C/A/P w/ IV contrast: no significant cardiopulmonary abnormality. Subtle nodularity in right lobe of thyroid lobe. Liver unremarkable. Several solid lesions involving the abdominal wall. 3.9cm lesion in upper abd wall to left of midline. 4.9cm lesion at more caudal level. Several solid lesions are seen within omental fat of left upper quadrant. Portion of this appears to be attached to stomach.    4/9/20 RLE venous duplex: occlusive DVT right popliteal vein.    11/15/19 CT A/P w/ IV contrast: colonic mass at splenic flexure 7.6x4.3cm, contiguous with the stomach. Numerous diverticula in descending and sigmoid colon    Path:  11/26/19 left colectomy with partial gastrectomy: tumor site splenic flexure, greatest dimension 8.5cm, no perforation, adenocarcinoma, mod to poorly differentiated, tumor invades visceral peritoneum and directly invades gastric wall. Margins neg. +LVI, +PNI, no tumor deposits. 3/14 LNs+.  pT4N1b    11/15/19 colon 60cm biopsy: poorly differentiated adenocarcinoma.  MLH1 loss of expression. MSH2 and MSH6 no loss of expression, PMS2 loss of expression. MSI-H both by MMR IHC and MSI PCR       Past Medical History:   Diagnosis Date    Bladder infection     BPH (benign prostatic hyperplasia)     Cancer     Colon cancer      DVT (deep venous thrombosis)     Hypertension     Suprapubic catheter 12/06/2023       Past Surgical History:   Procedure Laterality Date    ADENOIDECTOMY      BLADDER STONE REMOVAL  08/17/2023    CHOLECYSTECTOMY      COLECTOMY      EYE SURGERY      TONSILLECTOMY         Family History   Problem Relation Name Age of Onset    Lung cancer Mother         Social History     Socioeconomic History    Marital status:    Tobacco Use    Smoking status: Former    Smokeless tobacco: Never   Substance and Sexual Activity    Alcohol use: Not Currently    Drug use: Never    Sexual activity: Not Currently       Current Outpatient Medications   Medication Sig Dispense Refill    doxycycline (MONODOX) 100 MG capsule Take 100 mg by mouth.      nitrofurantoin, macrocrystal-monohydrate, (MACROBID) 100 MG capsule Take 100 mg by mouth 2 (two) times daily.      hyoscyamine (ANASPAZ,LEVSIN) 0.125 mg Tab Take 125 mcg by mouth. (Patient not taking: Reported on 4/29/2025)      levothyroxine (SYNTHROID) 200 MCG tablet Take 1 tablet (200 mcg total) by mouth once daily. 30 tablet 2     No current facility-administered medications for this visit.       Review of patient's allergies indicates:  No Known Allergies      Review of Systems:  CONSTITUTIONAL: no fevers, no chills, no weight loss, + chronic fatigue.   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  no tinnitus or hearing loss, no nose bleeding, no dysphagia, no odynophagia, no dental pain.   CVS: no chest pain, no palpitations, no dyspnea on exertion  RESP: no shortness of breath, no hemoptysis, no cough  GI: no nausea, no vomiting, no diarrhea, no constipation, no melena, no hematochezia, no hematemesis, no abdominal pain, no increase in abdominal girth  : Indwelling suprapubic Catheter, no dysuria, no hematuria, no hesitancy, no scrotal  swelling, no discharge  INTEGUMENT: no rashes, no abnormal bruising, no nail pitting, no hyperpigmentation  NEURO: no falls, no memory loss, no paresthesias or dysesthesias, no urofecal incontinence or retention, no loss of strength on any extremity  MSK: no back pain, no new joint pain, no joint swelling, no muscle weakness  PSYCH: no suicidal or homicidal ideation, no depression, no insomnia, no anhedonia  ENDOCRINE: no heat or cold intolerance, no polyuria, no polydipsia    Objective:     Vitals:    04/29/25 1348   BP: (!) 164/92   Pulse: 72   Resp: 14   Temp: 97.9 °F (36.6 °C)              Physical Exam:  GA: AAOx3, NAD  HEENT:  moist oral mucous membranes  RESP:  Equal chest rise, no accessory muscle use  ABDOMEN:  Distended, soft, nontender, no guarding or rigidity  :  Indwelling Muhammad catheter  EXT: no deformities, no pedal edema  SKIN: no rashes, warm and dry  NEURO: normal mentation, no gross neuro deficits          Assessment:   1. 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  Keytruda started on 7/2/20. Changed to q6week on 12/23/20  CT C/A/P on 7/16/21 and 11/2021 essentially ERIC.  CT chest abdomen pelvis in April 2022 with ERIC  Previously discussed with patient and daughter the options to continue close surveillance versus restart treatment with pembrolizumab given patient's long history of ERIC and almost 2 years of treatment with pembrolizumab.  Patient and daughter would like to continue pembrolizumab  with plans to decrease dose or increase the dosing interval if he were to have recurrent side effects from treatment.  Will plan to obtain imaging q4-6 months or as clinically indicated.    CT chest abdomen pelvis with contrast in September 2024 with no evidence of metastatic.  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  Continue Levothyroxine 225 mcg PO QD, emphasized the need for compliance, patient has had waxing and waning TSH likely due to noncompliance  Follow-up with urology as scheduled.  Patient to follow-up in 6 weeks repeat labs including CBC, CMP, TSH,CEA, and treatment.        Portions of the record may have been created with voice recognition software. Occasional wrong-word or sound-a-like substitutions may have occurred due to the inherent limitations of voice recognition software.

## 2025-06-04 DIAGNOSIS — E03.9 HYPOTHYROIDISM, UNSPECIFIED TYPE: ICD-10-CM

## 2025-06-04 RX ORDER — LEVOTHYROXINE SODIUM 200 UG/1
200 TABLET ORAL
Qty: 30 TABLET | Refills: 0 | Status: SHIPPED | OUTPATIENT
Start: 2025-06-04 | End: 2025-07-04

## 2025-06-09 NOTE — PROGRESS NOTES
Subjective:      Patient ID: Stefano Mccoy is a 89 y.o. male.    Chief Complaint:  Here for my treatment.    History of Present Illness  Chief complaint: Metastatic colon cancer, MSI-H    Onc Hx:  7/2/20-7/9/20: injectafer x2  7/2/20-current: keytruda    HPI: 88 y/o M w/ PMHx of HTN, DVT postop after colectomy (was on Xarelto 11/2019-6/2020, Xarelto stopped due to GI bleed and anemia requiring frequent transfusions), VALENTIN due to GI blood loss, BPH first presented with hematochezia 10-11/2019. He had a colonoscopy with Dr Lima that showed a mass at 60cm, biopsy with poorly differentiated adenocarcinoma. On 11/26/19 he had a left colectomy with partial gastrectomy with Dr Liu, showing poorly differentiated adenocarcinoma invading gastric wall and visceral peritoneum and with 3/14 LNs+. Due to his advanced age, no chemotherapy was offered. 3/2020 he was noted to be anemic, given IV iron infusion and referred back to Dr Liu. He had imaging at [facility] showing widespread intra-abdominal metastatic disease. Referred to [facility] to establish care    He was given 2 doses of injectafer 7/2020  He was started on Keytruda for MSI-H metastatic colon cancer on 7/2/20    Interval history:     Today, 6/10/25 Patient denies any acute concerns today. He continues tolerating treatment well without significant side effects.  He denies any new symptoms of pain, decreased appetite or weight loss.  He denies any blood in his stools or dark tarry stools.    Labs:  06/9/2025 creatinine 1.1, LFTs unremarkable, TSH 4.95, WBC count 8.6, hemoglobin 15.9, MCV 97.8, platelet count 364, CEA pending.    04/28/2025 creatinine 1.3, LFTs unremarkable, TSH 13.9, WBC count 10.6, hemoglobin 16.1, MCV 97.2, platelet count 407, CEA 2.4.    03/19/2025 creatinine 1.4, TSH 31.7, LFTs unremarkable, CBC unremarkable, CEA 2.6, cortisol 14.3    2/3/25: CEA pending, CMP and CBC unremarkable, tsh 1.55, cortisol pending     12/11/24: CEA 2.8, CMP and CBC  unremarkable, tsh 1.90, cortisol 1.9,   10/30/24: CMP unremarkable, TSH 3.09, cortisol 10.5T4 1.34, PSA 20.8, wbc 8.2, hgb 15.6, plt 311  09/18/2024:  Creatinine 1.3, albumin 3.1, LFTs unremarkable, TSH 6.5, WBC count 9.5, hemoglobin 15.9, MCV 97.4, platelet count 369, CEA 2.3, cortisol 10.3.    8/7/2024 creatinine 1.4, albumin 3.3, calcium 9.2, CEA 2.8, TSH 4.44, WBC 9.0, hemoglobin 16.6, MCV 96.4, platelet count 340, ANC 6.27    6/27/2024 creatinine 1.26, albumin 3.7, calcium 10, CEA 2.8, TSH 22.7730, WBC 9.98, hemoglobin 17, MCV 95, platelet count 379, ANC 6.18    5/15/2024 creatinine 1.12, albumin 3.3, calcium 8.9, CEA 2.3, TSH 1.9118, WBC 7.09, hemoglobin 15.3, MCV 94.9, platelet count 348, ANC 4.56    04/03/2024 creatinine 1.12, albumin 3.6, calcium 9.8, CEA 2.8, TSH 12.9, cortisol 14.5, hemoglobin 16.3, MCV 95.9, WBC count 10.1, platelet count 379, ANC 6.76.  2/21/24 Cr 1.2, tsh 15.63, CEA pending, WBC 8.7, Hgb 16, , ANC 5.88   1/10/24:  Creatinine 1.3, Cortisol 15.1, TSH 25, and CEA pending, WBC 9.8 hgb 15.6, Hct 47.6, Plt 343,000.  11/29/2023:  Creatinine 1.13, Cortisol 12.9, TSH 26.36, and CEA 3.0, WBC 8.53 hgb 15.7, Hct 47.9, Plt 333,000.  10/11/2023:  Creatinine 1.11.  Cortisol 8.8 TSH 6.71 and CEA 2.1.  WBC 8.35 hgb 15.6, Hct 47.9, Plt 380,000  8/30/2023:  CBC WNL.  CMP WNL with creatinine 1.07.  Cortisol 12.2, TSH 4.33, and CEA 2.6  07/19/2023 CMP unremarkable, TSH 11.28, CBC unremarkable.  CEA pending.  Phosphorus 2.7.  June 8, 2023 :  CMP dated 06/06/2023 is completely normal.  The TSH however is below 0.3 uIU/ml.  CBC shows hemoglobin of 15, white cell count 10,500 slightly elevated platelets 395, the differential is essentially normal.  April 10, 2023:  CMP dated 04/09/2023 is essentially normal except for a low albumin at 3.3 CBC is normal except for  abnormal indices , RBC and slight thrombocytosis  03/16/2023:  Creatinine 11.4, potassium 5.3, bicarb 20, sodium 131.  03/14/2023:  TSH 4.4,  cortisol 13.6, CEA 2.2, creatinine 7.3, albumin 3.1, calcium 8.8, LFTs within normal limits, free T4 1.23, WBC count 10.2, hemoglobin 14.1, MCV 96.4, platelet count 3 1 7, ANC 7.82.  CEA 2.2      Imagin2025 CT chest abdomen pelvis with contrast:  No definitive CT evidence of metastatic disease in the chest abdomen or pelvis.  Similar abnormal circumferential urinary bladder wall thickening with suprapubic catheter in place and perivesicular stranding as well as intravesicular calculi with associated with prostatomegaly findings could reflect sequelae of chronic outlet obstruction although underlying cystitis possible.  Other chronic findings and incidental findings as above.    2024 CT chest abdomen pelvis with contrast:  Prostatomegaly, heterogeneous enhancement is also present in the prostate gland which may be benign however correlation PSA to exclude tumor is recommended platelet bladder calculi again noted identified as well as irregularity of the bladder wall, bladder cavity lined primary vesicular fat stranding suggestive of cystitis, no evidence of metastatic disease.    2024 CT chest abdomen pelvis with contrast no significant change.  No evidence of metastatic disease to the chest.  Chronic findings as noted above.  No significant change in the abdomen.  No evidence of residual or recurrent tumor.  No evidence of metastatic disease to the abdomen or pelvis.  Removal of Muhammad catheter with placement of suprapubic bladder catheter.  There was persistent inflammatory stranding along the superior aspect of the bladder with numerous tiny bladder diverticuli.  No evidence of extraluminal air or fluid to suggest perforation.  Status post cholecystectomy.  Diffuse fatty infiltration of the liver.  Stable right renal cysts.  Other chronic findings as noted above.    8/15/2023:  CT of the chest with contrast:  Impression:  Mild fatty prominence along the right lateral chest wall felt to  represent the abnormality seen on recent chest x-ray.  No evidence of any suspicious mass.    CT of the chest ,abdomen , and pelvis obtained on June 2, 2023, and compared  to  the November 08/2022, reveals no evidence of metastatic disease in the lung.  There are in the abdomen various nodes that are borderline in size.  They are also stable nodes in the retroperitoneum.  A ventral hernia is seen and, he has multiple stones and diverticuli in the bladder.    12/19/2022:  Ultrasound thyroid.  Thyroid tissue not clearly identified, although thyroid gland may be diffusely heterogenous, although with no gross focal thyroid nodule identified.  11/08/2022 CT chest abdomen pelvis with contrast:  No evidence of metastatic disease.  No significant change since prior study in the chest or abdomen.    04/26/2022 CT chest abdomen pelvis with contrast:  No evidence of metastatic disease or other significant change since previous CT dated 11/30/2021.      11/30/21 CT C/A/P: stable chest CT with no evidence of metastatic disease. No evidence of metastatic disease or other significant changes in abdomen/pelvis since prior CT    7/16/21 CT C/A/P w/ IV contrast: no evidence of metastatic disease in chest. No evidence of neoplastic disease within abd or pelvis. Fatty liver has worsened. Prostate enlargement. Multiple bladder calculi. Distal colonic diverticulosis. Atherosclerosis.    3/16/21 CT C/A/P: No significant changes and no evidence of metastatic disease in the chest. Decrease in the size of the small soft tissue density nodules in the left upper quadrant. No other significant changes.    11/30/20 CT C/A/P w/ IV contrast: no evidence of thoracic metastatic disease. Left upper quadrant abd wall thickening has decreased slightly. Small area of soft tissue density seen in left upper quadrant adjacent to colon. Measures minimally smaller than on prior exam. Additional area of soft tissue density adjacent to stomach and pancreatic  tail has not changed. Overall, several small soft tissue masses remain with left abdomen and left abdominal wall, few of those minimally decreased in size from prior scan.    9/18/20 CT C/A/P w/ IV contrast: no evidence of thoracic metastatic disease. Significant decrease in size of previously described masses within abd wall on the left. Residual mild localized soft tissue thickening of upper anterior abd wall to the left of midline adjacent to left lobe of liver at site of previously demonstrated mass. Residual oval shaped soft tissue mass more caudally on the left [id number] suspicious for residual tumor. Also residual mass in left upper quadrant between gastric body and tAil of pancreas that has also decreased in size and remains contiguous with gastric wall. Omental mass decreased to 3cm.    6/9/20 CT C/A/P w/ IV contrast: no significant cardiopulmonary abnormality. Subtle nodularity in right lobe of thyroid lobe. Liver unremarkable. Several solid lesions involving the abdominal wall. 3.9cm lesion in upper abd wall to left of midline. 4.9cm lesion at more caudal level. Several solid lesions are seen within omental fat of left upper quadrant. Portion of this appears to be attached to stomach.    4/9/20 RLE venous duplex: occlusive DVT right popliteal vein.    11/15/19 CT A/P w/ IV contrast: colonic mass at splenic flexure 7.6x4.3cm, contiguous with the stomach. Numerous diverticula in descending and sigmoid colon    Path:  11/26/19 left colectomy with partial gastrectomy: tumor site splenic flexure, greatest dimension 8.5cm, no perforation, adenocarcinoma, mod to poorly differentiated, tumor invades visceral peritoneum and directly invades gastric wall. Margins neg. +LVI, +PNI, no tumor deposits. 3/14 LNs+.  [id number]    11/15/19 colon 60cm biopsy: poorly differentiated adenocarcinoma.  MLH1 loss of expression. MSH2 and MSH6 no loss of expression, PMS2 loss of expression. MSI-H both by MMR IHC and MSI PCR       Past  Medical History:   Diagnosis Date    Bladder infection     BPH (benign prostatic hyperplasia)     Cancer     Colon cancer     DVT (deep venous thrombosis)     Hypertension     Suprapubic catheter 12/06/2023       Past Surgical History:   Procedure Laterality Date    ADENOIDECTOMY      BLADDER STONE REMOVAL  08/17/2023    CHOLECYSTECTOMY      COLECTOMY      EYE SURGERY      TONSILLECTOMY         Family History   Problem Relation Name Age of Onset    Lung cancer Mother         Social History     Socioeconomic History    Marital status:    Tobacco Use    Smoking status: Former    Smokeless tobacco: Never   Substance and Sexual Activity    Alcohol use: Not Currently    Drug use: Never    Sexual activity: Not Currently       Current Outpatient Medications   Medication Sig Dispense Refill    doxycycline (MONODOX) 100 MG capsule Take 100 mg by mouth.      hyoscyamine (ANASPAZ,LEVSIN) 0.125 mg Tab Take 125 mcg by mouth. (Patient not taking: Reported on 4/29/2025)      levothyroxine (SYNTHROID) 200 MCG tablet Take 1 tablet (200 mcg total) by mouth before breakfast. 30 tablet 0    nitrofurantoin, macrocrystal-monohydrate, (MACROBID) 100 MG capsule Take 100 mg by mouth 2 (two) times daily.       No current facility-administered medications for this visit.       Review of patient's allergies indicates:  No Known Allergies      Review of Systems:  CONSTITUTIONAL: no fevers, no chills, no weight loss, + chronic fatigue.   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  no tinnitus or hearing loss, no nose bleeding, no dysphagia, no odynophagia, no dental pain.   CVS: no chest pain, no palpitations, no dyspnea on exertion  RESP: no shortness of breath, no hemoptysis, no cough  GI: no nausea, no vomiting, no diarrhea, no constipation, no melena, no hematochezia, no hematemesis, no  abdominal pain, no increase in abdominal girth  : Indwelling suprapubic Catheter, no dysuria, no hematuria, no hesitancy, no scrotal swelling, no discharge  INTEGUMENT: no rashes, no abnormal bruising, no nail pitting, no hyperpigmentation  NEURO: no falls, no memory loss, no paresthesias or dysesthesias, no urofecal incontinence or retention, no loss of strength on any extremity  MSK: no back pain, no new joint pain, no joint swelling, no muscle weakness  PSYCH: no suicidal or homicidal ideation, no depression, no insomnia, no anhedonia  ENDOCRINE: no heat or cold intolerance, no polyuria, no polydipsia    Objective:     There were no vitals filed for this visit.             Physical Exam:  GA: AAOx3, NAD  HEENT:  moist oral mucous membranes  RESP:  Equal chest rise, no accessory muscle use  ABDOMEN:  Distended, soft, nontender, no guarding or rigidity  :  Indwelling Muhammad catheter  EXT: no deformities, no pedal edema  SKIN: no rashes, warm and dry  NEURO: normal mentation, no gross neuro deficits          Assessment:   1. 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  Keytruda started on 7/2/20. Changed to q6week on 12/23/20  CT C/A/P on 7/16/21 and 11/2021 essentially ERIC.  CT chest abdomen pelvis in April 2022 with ERIC  Previously discussed with patient and daughter the options to continue close surveillance versus restart treatment with pembrolizumab given patient's long history of ERIC and almost 2 years of  treatment with pembrolizumab.  Patient and daughter would like to continue pembrolizumab with plans to decrease dose or increase the dosing interval if he were to have recurrent side effects from treatment.  Will plan to obtain imaging q4-6 months or as clinically indicated.    CT chest abdomen pelvis with contrast in September 2024 with no evidence of metastatic.  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  Continue Levothyroxine 225 mcg PO QD, emphasized the need for compliance, patient has had waxing and waning TSH likely due to noncompliance  Follow-up with urology as scheduled.  Patient to follow-up in 6 weeks repeat labs including CBC, CMP, TSH,CEA, and treatment.    Renee COWARTP-C

## 2025-06-10 ENCOUNTER — OFFICE VISIT (OUTPATIENT)
Dept: HEMATOLOGY/ONCOLOGY | Facility: CLINIC | Age: OVER 89
End: 2025-06-10
Payer: MEDICARE

## 2025-06-10 VITALS
RESPIRATION RATE: 16 BRPM | WEIGHT: 259.81 LBS | SYSTOLIC BLOOD PRESSURE: 131 MMHG | DIASTOLIC BLOOD PRESSURE: 80 MMHG | HEART RATE: 64 BPM | BODY MASS INDEX: 40.78 KG/M2 | OXYGEN SATURATION: 95 % | TEMPERATURE: 98 F | HEIGHT: 67 IN

## 2025-06-10 DIAGNOSIS — Z51.12 ENCOUNTER FOR ANTINEOPLASTIC IMMUNOTHERAPY: ICD-10-CM

## 2025-06-10 DIAGNOSIS — E04.1 THYROID NODULE: ICD-10-CM

## 2025-06-10 DIAGNOSIS — C78.6 SECONDARY MALIGNANT NEOPLASM OF RETROPERITONEUM AND PERITONEUM: ICD-10-CM

## 2025-06-10 DIAGNOSIS — N40.0 ENLARGED PROSTATE: ICD-10-CM

## 2025-06-10 DIAGNOSIS — C18.9 COLON CANCER METASTASIZED TO MULTIPLE SITES: Primary | ICD-10-CM

## 2025-06-10 DIAGNOSIS — E06.4 DRUG-INDUCED THYROIDITIS: ICD-10-CM

## 2025-06-10 RX ORDER — SODIUM CHLORIDE 0.9 % (FLUSH) 0.9 %
10 SYRINGE (ML) INJECTION
Status: CANCELLED | OUTPATIENT
Start: 2025-06-10

## 2025-06-10 RX ORDER — HEPARIN 100 UNIT/ML
500 SYRINGE INTRAVENOUS
Status: CANCELLED | OUTPATIENT
Start: 2025-06-10

## 2025-06-10 RX ORDER — DIPHENHYDRAMINE HYDROCHLORIDE 50 MG/ML
50 INJECTION, SOLUTION INTRAMUSCULAR; INTRAVENOUS ONCE AS NEEDED
Status: CANCELLED | OUTPATIENT
Start: 2025-06-10

## 2025-06-10 RX ORDER — EPINEPHRINE 0.3 MG/.3ML
0.3 INJECTION SUBCUTANEOUS ONCE AS NEEDED
Status: CANCELLED | OUTPATIENT
Start: 2025-06-10

## 2025-07-01 DIAGNOSIS — E03.9 HYPOTHYROIDISM, UNSPECIFIED TYPE: ICD-10-CM

## 2025-07-02 RX ORDER — LEVOTHYROXINE SODIUM 200 UG/1
200 TABLET ORAL
Qty: 30 TABLET | Refills: 0 | Status: SHIPPED | OUTPATIENT
Start: 2025-07-02 | End: 2025-08-01

## 2025-07-22 ENCOUNTER — OFFICE VISIT (OUTPATIENT)
Dept: HEMATOLOGY/ONCOLOGY | Facility: CLINIC | Age: OVER 89
End: 2025-07-22
Payer: MEDICARE

## 2025-07-22 VITALS
SYSTOLIC BLOOD PRESSURE: 157 MMHG | HEART RATE: 83 BPM | TEMPERATURE: 98 F | OXYGEN SATURATION: 95 % | WEIGHT: 259.81 LBS | RESPIRATION RATE: 16 BRPM | BODY MASS INDEX: 40.78 KG/M2 | HEIGHT: 67 IN | DIASTOLIC BLOOD PRESSURE: 86 MMHG

## 2025-07-22 DIAGNOSIS — C78.6 SECONDARY MALIGNANT NEOPLASM OF RETROPERITONEUM AND PERITONEUM: ICD-10-CM

## 2025-07-22 DIAGNOSIS — E06.4 DRUG-INDUCED THYROIDITIS: ICD-10-CM

## 2025-07-22 DIAGNOSIS — C18.9 COLON CANCER METASTASIZED TO MULTIPLE SITES: Primary | ICD-10-CM

## 2025-07-22 PROCEDURE — 99214 OFFICE O/P EST MOD 30 MIN: CPT | Mod: ,,,

## 2025-07-22 RX ORDER — SODIUM CHLORIDE 0.9 % (FLUSH) 0.9 %
10 SYRINGE (ML) INJECTION
Status: CANCELLED | OUTPATIENT
Start: 2025-07-22

## 2025-07-22 RX ORDER — METHENAMINE HIPPURATE 1000 MG/1
1 TABLET ORAL 2 TIMES DAILY
COMMUNITY
Start: 2025-06-23

## 2025-07-22 RX ORDER — DIPHENHYDRAMINE HYDROCHLORIDE 50 MG/ML
50 INJECTION, SOLUTION INTRAMUSCULAR; INTRAVENOUS ONCE AS NEEDED
Status: CANCELLED | OUTPATIENT
Start: 2025-07-22

## 2025-07-22 RX ORDER — EPINEPHRINE 0.3 MG/.3ML
0.3 INJECTION SUBCUTANEOUS ONCE AS NEEDED
Status: CANCELLED | OUTPATIENT
Start: 2025-07-22

## 2025-07-22 RX ORDER — HEPARIN 100 UNIT/ML
500 SYRINGE INTRAVENOUS
Status: CANCELLED | OUTPATIENT
Start: 2025-07-22

## 2025-07-22 NOTE — PROGRESS NOTES
Subjective:      Patient ID: Stefano Mccoy is a 89 y.o. male.    Chief Complaint:  Here for my treatment.    History of Present Illness  Chief complaint: Metastatic colon cancer, MSI-H    Onc Hx:  7/2/20-7/9/20: injectafer x2  7/2/20-current: keytruda    HPI: 86 y/o M w/ PMHx of HTN, DVT postop after colectomy (was on Xarelto 11/2019-6/2020, Xarelto stopped due to GI bleed and anemia requiring frequent transfusions), VALENTIN due to GI blood loss, BPH first presented with hematochezia 10-11/2019. He had a colonoscopy with Dr Lima that showed a mass at 60cm, biopsy with poorly differentiated adenocarcinoma. On 11/26/19 he had a left colectomy with partial gastrectomy with Dr Liu, showing poorly differentiated adenocarcinoma invading gastric wall and visceral peritoneum and with 3/14 LNs+. Due to his advanced age, no chemotherapy was offered. 3/2020 he was noted to be anemic, given IV iron infusion and referred back to Dr Liu. He had imaging at [facility] showing widespread intra-abdominal metastatic disease. Referred to [facility] to establish care    He was given 2 doses of injectafer 7/2020  He was started on Keytruda for MSI-H metastatic colon cancer on 7/2/20    Interval history:     Today, 07/22/25 Patient denies any acute concerns today. He continues tolerating treatment well without significant side effects.  He denies any new symptoms of pain, decreased appetite or weight loss.  He denies any blood in his stools or dark tarry stools. Reinforced taking levothyroxine on empty stomach to keep tsh <6.0    Labs:  07/21/2025 creatinine 1.3, LFTs unremarkable, TSH 13.96, WBC count 9.6, hemoglobin 16.4, platelet count 368, CEA pending.  06/9/2025 creatinine 1.1, LFTs unremarkable, TSH 4.95, WBC count 8.6, hemoglobin 15.9, MCV 97.8, platelet count 364, CEA 2.6    04/28/2025 creatinine 1.3, LFTs unremarkable, TSH 13.9, WBC count 10.6, hemoglobin 16.1, MCV 97.2, platelet count 407, CEA 2.4.    03/19/2025 creatinine 1.4, TSH  31.7, LFTs unremarkable, CBC unremarkable, CEA 2.6, cortisol 14.3    2/3/25: CEA pending, CMP and CBC unremarkable, tsh 1.55, cortisol pending     12/11/24: CEA 2.8, CMP and CBC unremarkable, tsh 1.90, cortisol 1.9,   10/30/24: CMP unremarkable, TSH 3.09, cortisol 10.5T4 1.34, PSA 20.8, wbc 8.2, hgb 15.6, plt 311  09/18/2024:  Creatinine 1.3, albumin 3.1, LFTs unremarkable, TSH 6.5, WBC count 9.5, hemoglobin 15.9, MCV 97.4, platelet count 369, CEA 2.3, cortisol 10.3.    8/7/2024 creatinine 1.4, albumin 3.3, calcium 9.2, CEA 2.8, TSH 4.44, WBC 9.0, hemoglobin 16.6, MCV 96.4, platelet count 340, ANC 6.27    6/27/2024 creatinine 1.26, albumin 3.7, calcium 10, CEA 2.8, TSH 22.7730, WBC 9.98, hemoglobin 17, MCV 95, platelet count 379, ANC 6.18    5/15/2024 creatinine 1.12, albumin 3.3, calcium 8.9, CEA 2.3, TSH 1.9118, WBC 7.09, hemoglobin 15.3, MCV 94.9, platelet count 348, ANC 4.56    04/03/2024 creatinine 1.12, albumin 3.6, calcium 9.8, CEA 2.8, TSH 12.9, cortisol 14.5, hemoglobin 16.3, MCV 95.9, WBC count 10.1, platelet count 379, ANC 6.76.  2/21/24 Cr 1.2, tsh 15.63, CEA pending, WBC 8.7, Hgb 16, , ANC 5.88   1/10/24:  Creatinine 1.3, Cortisol 15.1, TSH 25, and CEA pending, WBC 9.8 hgb 15.6, Hct 47.6, Plt 343,000.  11/29/2023:  Creatinine 1.13, Cortisol 12.9, TSH 26.36, and CEA 3.0, WBC 8.53 hgb 15.7, Hct 47.9, Plt 333,000.  10/11/2023:  Creatinine 1.11.  Cortisol 8.8 TSH 6.71 and CEA 2.1.  WBC 8.35 hgb 15.6, Hct 47.9, Plt 380,000  8/30/2023:  CBC WNL.  CMP WNL with creatinine 1.07.  Cortisol 12.2, TSH 4.33, and CEA 2.6  07/19/2023 CMP unremarkable, TSH 11.28, CBC unremarkable.  CEA pending.  Phosphorus 2.7.  June 8, 2023 :  CMP dated 06/06/2023 is completely normal.  The TSH however is below 0.3 uIU/ml.  CBC shows hemoglobin of 15, white cell count 10,500 slightly elevated platelets 395, the differential is essentially normal.  April 10, 2023:  CMP dated 04/09/2023 is essentially normal except for a low  albumin at 3.3 CBC is normal except for  abnormal indices , RBC and slight thrombocytosis  2023:  Creatinine 11.4, potassium 5.3, bicarb 20, sodium 131.  2023:  TSH 4.4, cortisol 13.6, CEA 2.2, creatinine 7.3, albumin 3.1, calcium 8.8, LFTs within normal limits, free T4 1.23, WBC count 10.2, hemoglobin 14.1, MCV 96.4, platelet count 3 1 7, ANC 7.82.  CEA 2.2      Imagin2025 CT chest abdomen pelvis with contrast:  No definitive CT evidence of metastatic disease in the chest abdomen or pelvis.  Similar abnormal circumferential urinary bladder wall thickening with suprapubic catheter in place and perivesicular stranding as well as intravesicular calculi with associated with prostatomegaly findings could reflect sequelae of chronic outlet obstruction although underlying cystitis possible.  Other chronic findings and incidental findings as above.    2024 CT chest abdomen pelvis with contrast:  Prostatomegaly, heterogeneous enhancement is also present in the prostate gland which may be benign however correlation PSA to exclude tumor is recommended platelet bladder calculi again noted identified as well as irregularity of the bladder wall, bladder cavity lined primary vesicular fat stranding suggestive of cystitis, no evidence of metastatic disease.    2024 CT chest abdomen pelvis with contrast no significant change.  No evidence of metastatic disease to the chest.  Chronic findings as noted above.  No significant change in the abdomen.  No evidence of residual or recurrent tumor.  No evidence of metastatic disease to the abdomen or pelvis.  Removal of Muhammad catheter with placement of suprapubic bladder catheter.  There was persistent inflammatory stranding along the superior aspect of the bladder with numerous tiny bladder diverticuli.  No evidence of extraluminal air or fluid to suggest perforation.  Status post cholecystectomy.  Diffuse fatty infiltration of the liver.  Stable right  renal cysts.  Other chronic findings as noted above.    8/15/2023:  CT of the chest with contrast:  Impression:  Mild fatty prominence along the right lateral chest wall felt to represent the abnormality seen on recent chest x-ray.  No evidence of any suspicious mass.    CT of the chest ,abdomen , and pelvis obtained on June 2, 2023, and compared  to  the November 08/2022, reveals no evidence of metastatic disease in the lung.  There are in the abdomen various nodes that are borderline in size.  They are also stable nodes in the retroperitoneum.  A ventral hernia is seen and, he has multiple stones and diverticuli in the bladder.    12/19/2022:  Ultrasound thyroid.  Thyroid tissue not clearly identified, although thyroid gland may be diffusely heterogenous, although with no gross focal thyroid nodule identified.  11/08/2022 CT chest abdomen pelvis with contrast:  No evidence of metastatic disease.  No significant change since prior study in the chest or abdomen.    04/26/2022 CT chest abdomen pelvis with contrast:  No evidence of metastatic disease or other significant change since previous CT dated 11/30/2021.      11/30/21 CT C/A/P: stable chest CT with no evidence of metastatic disease. No evidence of metastatic disease or other significant changes in abdomen/pelvis since prior CT    7/16/21 CT C/A/P w/ IV contrast: no evidence of metastatic disease in chest. No evidence of neoplastic disease within abd or pelvis. Fatty liver has worsened. Prostate enlargement. Multiple bladder calculi. Distal colonic diverticulosis. Atherosclerosis.    3/16/21 CT C/A/P: No significant changes and no evidence of metastatic disease in the chest. Decrease in the size of the small soft tissue density nodules in the left upper quadrant. No other significant changes.    11/30/20 CT C/A/P w/ IV contrast: no evidence of thoracic metastatic disease. Left upper quadrant abd wall thickening has decreased slightly. Small area of soft tissue  density seen in left upper quadrant adjacent to colon. Measures minimally smaller than on prior exam. Additional area of soft tissue density adjacent to stomach and pancreatic tail has not changed. Overall, several small soft tissue masses remain with left abdomen and left abdominal wall, few of those minimally decreased in size from prior scan.    9/18/20 CT C/A/P w/ IV contrast: no evidence of thoracic metastatic disease. Significant decrease in size of previously described masses within abd wall on the left. Residual mild localized soft tissue thickening of upper anterior abd wall to the left of midline adjacent to left lobe of liver at site of previously demonstrated mass. Residual oval shaped soft tissue mass more caudally on the left [id number] suspicious for residual tumor. Also residual mass in left upper quadrant between gastric body and tAil of pancreas that has also decreased in size and remains contiguous with gastric wall. Omental mass decreased to 3cm.    6/9/20 CT C/A/P w/ IV contrast: no significant cardiopulmonary abnormality. Subtle nodularity in right lobe of thyroid lobe. Liver unremarkable. Several solid lesions involving the abdominal wall. 3.9cm lesion in upper abd wall to left of midline. 4.9cm lesion at more caudal level. Several solid lesions are seen within omental fat of left upper quadrant. Portion of this appears to be attached to stomach.    4/9/20 RLE venous duplex: occlusive DVT right popliteal vein.    11/15/19 CT A/P w/ IV contrast: colonic mass at splenic flexure 7.6x4.3cm, contiguous with the stomach. Numerous diverticula in descending and sigmoid colon    Path:  11/26/19 left colectomy with partial gastrectomy: tumor site splenic flexure, greatest dimension 8.5cm, no perforation, adenocarcinoma, mod to poorly differentiated, tumor invades visceral peritoneum and directly invades gastric wall. Margins neg. +LVI, +PNI, no tumor deposits. 3/14 LNs+.  [id number]    11/15/19 colon 60cm  biopsy: poorly differentiated adenocarcinoma.  MLH1 loss of expression. MSH2 and MSH6 no loss of expression, PMS2 loss of expression. MSI-H both by MMR IHC and MSI PCR       Past Medical History:   Diagnosis Date    Bladder infection     BPH (benign prostatic hyperplasia)     Cancer     Colon cancer     DVT (deep venous thrombosis)     Hypertension     Suprapubic catheter 12/06/2023       Past Surgical History:   Procedure Laterality Date    ADENOIDECTOMY      BLADDER STONE REMOVAL  08/17/2023    CHOLECYSTECTOMY      COLECTOMY      EYE SURGERY      TONSILLECTOMY         Family History   Problem Relation Name Age of Onset    Lung cancer Mother         Social History     Socioeconomic History    Marital status:    Tobacco Use    Smoking status: Former    Smokeless tobacco: Never   Substance and Sexual Activity    Alcohol use: Not Currently    Drug use: Never    Sexual activity: Not Currently       Current Outpatient Medications   Medication Sig Dispense Refill    doxycycline (MONODOX) 100 MG capsule Take 100 mg by mouth.      hyoscyamine (ANASPAZ,LEVSIN) 0.125 mg Tab Take 125 mcg by mouth. (Patient not taking: Reported on 7/22/2025)      levothyroxine (SYNTHROID) 200 MCG tablet Take 1 tablet (200 mcg total) by mouth before breakfast. 30 tablet 0    nitrofurantoin, macrocrystal-monohydrate, (MACROBID) 100 MG capsule Take 100 mg by mouth 2 (two) times daily.       No current facility-administered medications for this visit.       Review of patient's allergies indicates:  No Known Allergies      Review of Systems:  CONSTITUTIONAL: no fevers, no chills, no weight loss, + chronic fatigue.   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  no tinnitus or hearing loss, no nose bleeding, no dysphagia, no odynophagia, no dental pain.   CVS: no chest pain, no palpitations, no dyspnea  on exertion  RESP: no shortness of breath, no hemoptysis, no cough  GI: no nausea, no vomiting, no diarrhea, no constipation, no melena, no hematochezia, no hematemesis, no abdominal pain, no increase in abdominal girth  : Indwelling suprapubic Catheter, no dysuria, no hematuria, no hesitancy, no scrotal swelling, no discharge  INTEGUMENT: no rashes, no abnormal bruising, no nail pitting, no hyperpigmentation  NEURO: no falls, no memory loss, no paresthesias or dysesthesias, no urofecal incontinence or retention, no loss of strength on any extremity  MSK: no back pain, no new joint pain, no joint swelling, no muscle weakness  PSYCH: no suicidal or homicidal ideation, no depression, no insomnia, no anhedonia  ENDOCRINE: no heat or cold intolerance, no polyuria, no polydipsia    Objective:     Vitals:    07/22/25 1301   BP: (!) 157/86   Pulse: 83   Resp: 16   Temp: 97.7 °F (36.5 °C)                Physical Exam:  GA: AAOx3, NAD  HEENT:  moist oral mucous membranes  RESP:  Equal chest rise, no accessory muscle use  ABDOMEN:  Distended, soft, nontender, no guarding or rigidity  :  Indwelling Muhammad catheter  EXT: no deformities, no pedal edema  SKIN: no rashes, warm and dry  NEURO: normal mentation, no gross neuro deficits          Assessment:   1. 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  Keytruda started on 7/2/20. Changed to q6week on 12/23/20  CT C/A/P on 7/16/21 and 11/2021 essentially ERIC.  CT chest abdomen  pelvis in April 2022 with ERIC  Previously discussed with patient and daughter the options to continue close surveillance versus restart treatment with pembrolizumab given patient's long history of ERIC and almost 2 years of treatment with pembrolizumab.  Patient and daughter would like to continue pembrolizumab with plans to decrease dose or increase the dosing interval if he were to have recurrent side effects from treatment.  Will plan to obtain imaging q4-6 months or as clinically indicated.    CT chest abdomen pelvis with contrast in September 2024 with no evidence of metastatic.  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  Continue Levothyroxine 225 mcg PO QD, emphasized the need for compliance, patient has had waxing and waning TSH likely due to noncompliance  Follow-up with urology as scheduled.  Will plan to repeat CT CAP w/contrast on RTC. Ordered today  Patient to follow-up in 6 weeks repeat labs including CBC, CMP, TSH,CEA, and treatment.

## 2025-08-20 DIAGNOSIS — C18.9 COLON CANCER METASTASIZED TO MULTIPLE SITES: Primary | ICD-10-CM

## 2025-08-29 DIAGNOSIS — E03.9 HYPOTHYROIDISM, UNSPECIFIED TYPE: ICD-10-CM

## 2025-08-29 RX ORDER — LEVOTHYROXINE SODIUM 200 UG/1
200 TABLET ORAL
Qty: 30 TABLET | Refills: 0 | Status: SHIPPED | OUTPATIENT
Start: 2025-08-29 | End: 2025-09-28

## 2025-09-02 ENCOUNTER — OFFICE VISIT (OUTPATIENT)
Dept: HEMATOLOGY/ONCOLOGY | Facility: CLINIC | Age: OVER 89
End: 2025-09-02
Payer: MEDICARE

## 2025-09-02 VITALS
RESPIRATION RATE: 14 BRPM | HEART RATE: 65 BPM | BODY MASS INDEX: 40.38 KG/M2 | TEMPERATURE: 98 F | HEIGHT: 67 IN | OXYGEN SATURATION: 95 % | SYSTOLIC BLOOD PRESSURE: 138 MMHG | WEIGHT: 257.31 LBS | DIASTOLIC BLOOD PRESSURE: 85 MMHG

## 2025-09-02 DIAGNOSIS — E03.9 HYPOTHYROIDISM, UNSPECIFIED TYPE: ICD-10-CM

## 2025-09-02 DIAGNOSIS — C18.9 COLON CANCER METASTASIZED TO MULTIPLE SITES: Primary | ICD-10-CM

## 2025-09-02 DIAGNOSIS — C78.6 SECONDARY MALIGNANT NEOPLASM OF RETROPERITONEUM AND PERITONEUM: ICD-10-CM

## 2025-09-02 RX ORDER — EPINEPHRINE 0.3 MG/.3ML
0.3 INJECTION SUBCUTANEOUS ONCE AS NEEDED
Status: CANCELLED | OUTPATIENT
Start: 2025-09-02 | End: 2037-01-29

## 2025-09-02 RX ORDER — DIPHENHYDRAMINE HYDROCHLORIDE 50 MG/ML
50 INJECTION, SOLUTION INTRAMUSCULAR; INTRAVENOUS ONCE AS NEEDED
Status: CANCELLED | OUTPATIENT
Start: 2025-09-02 | End: 2037-01-29

## 2025-09-02 RX ORDER — HEPARIN 100 UNIT/ML
500 SYRINGE INTRAVENOUS
Status: CANCELLED | OUTPATIENT
Start: 2025-09-02

## 2025-09-02 RX ORDER — SODIUM CHLORIDE 0.9 % (FLUSH) 0.9 %
10 SYRINGE (ML) INJECTION
Status: CANCELLED | OUTPATIENT
Start: 2025-09-02